# Patient Record
Sex: MALE | Race: WHITE | NOT HISPANIC OR LATINO | Employment: OTHER | URBAN - METROPOLITAN AREA
[De-identification: names, ages, dates, MRNs, and addresses within clinical notes are randomized per-mention and may not be internally consistent; named-entity substitution may affect disease eponyms.]

---

## 2018-07-20 ENCOUNTER — HOSPITAL LABORATORY (OUTPATIENT)
Dept: OTHER | Facility: CLINIC | Age: 58
End: 2018-07-20

## 2018-07-20 LAB
APPEARANCE FLD: NORMAL
COLOR FLD: NORMAL
CRYSTALS SNV MICRO: NORMAL
EOSINOPHIL NFR FLD MANUAL: 2 %
GRAM STN SPEC: NORMAL
LYMPHOCYTES NFR FLD MANUAL: 43 %
MONOS+MACROS NFR FLD MANUAL: 16 %
NEUTS BAND NFR FLD MANUAL: 36 %
OTHER CELLS FLD MANUAL: 3 %
SPECIMEN SOURCE FLD: NORMAL
SPECIMEN SOURCE: NORMAL
WBC # FLD AUTO: 362 /UL

## 2018-07-23 LAB
ACID FAST STN SPEC QL: NORMAL
SPECIMEN SOURCE: NORMAL

## 2018-07-25 LAB
BACTERIA SPEC CULT: NO GROWTH
SPECIMEN SOURCE: NORMAL

## 2018-07-31 LAB
SPECIMEN SOURCE: NORMAL
VIRUS SPEC CULT: NORMAL
VIRUS SPEC CULT: NORMAL

## 2018-08-03 LAB
BACTERIA SPEC CULT: NORMAL
Lab: NORMAL
SPECIMEN SOURCE: NORMAL

## 2018-08-17 LAB
FUNGUS SPEC CULT: NORMAL
SPECIMEN SOURCE: NORMAL

## 2018-09-18 LAB
MYCOBACTERIUM SPEC CULT: NORMAL
MYCOBACTERIUM SPEC CULT: NORMAL
SPECIMEN SOURCE: NORMAL

## 2020-09-04 ENCOUNTER — HOSPITAL ENCOUNTER (EMERGENCY)
Facility: CLINIC | Age: 60
Discharge: HOME | End: 2020-09-04
Attending: INTERNAL MEDICINE | Admitting: INTERNAL MEDICINE
Payer: COMMERCIAL

## 2020-09-04 ENCOUNTER — APPOINTMENT (OUTPATIENT)
Dept: GENERAL RADIOLOGY | Facility: CLINIC | Age: 60
End: 2020-09-04
Attending: INTERNAL MEDICINE
Payer: COMMERCIAL

## 2020-09-04 VITALS
HEART RATE: 104 BPM | OXYGEN SATURATION: 99 % | BODY MASS INDEX: 36.06 KG/M2 | TEMPERATURE: 97.8 F | WEIGHT: 290 LBS | SYSTOLIC BLOOD PRESSURE: 157 MMHG | HEIGHT: 75 IN | DIASTOLIC BLOOD PRESSURE: 89 MMHG | RESPIRATION RATE: 18 BRPM

## 2020-09-04 DIAGNOSIS — S63.279A DISLOCATION OF INTERPHALANGEAL JOINT OF FINGER, INITIAL ENCOUNTER: ICD-10-CM

## 2020-09-04 DIAGNOSIS — S61.214A LACERATION OF RIGHT RING FINGER WITHOUT FOREIGN BODY WITHOUT DAMAGE TO NAIL, INITIAL ENCOUNTER: ICD-10-CM

## 2020-09-04 PROCEDURE — 26770 TREAT FINGER DISLOCATION: CPT | Mod: F8

## 2020-09-04 PROCEDURE — 12001 RPR S/N/AX/GEN/TRNK 2.5CM/<: CPT | Mod: XU,F8

## 2020-09-04 PROCEDURE — 90471 IMMUNIZATION ADMIN: CPT

## 2020-09-04 PROCEDURE — 96374 THER/PROPH/DIAG INJ IV PUSH: CPT

## 2020-09-04 PROCEDURE — 25000128 H RX IP 250 OP 636: Performed by: INTERNAL MEDICINE

## 2020-09-04 PROCEDURE — 73140 X-RAY EXAM OF FINGER(S): CPT | Mod: RT

## 2020-09-04 PROCEDURE — 99284 EMERGENCY DEPT VISIT MOD MDM: CPT | Mod: 25

## 2020-09-04 PROCEDURE — 90715 TDAP VACCINE 7 YRS/> IM: CPT | Performed by: INTERNAL MEDICINE

## 2020-09-04 RX ORDER — LIDOCAINE HYDROCHLORIDE 10 MG/ML
INJECTION, SOLUTION INFILTRATION; PERINEURAL
Status: DISCONTINUED
Start: 2020-09-04 | End: 2020-09-04 | Stop reason: HOSPADM

## 2020-09-04 RX ORDER — CEFAZOLIN SODIUM 2 G/100ML
2 INJECTION, SOLUTION INTRAVENOUS ONCE
Status: COMPLETED | OUTPATIENT
Start: 2020-09-04 | End: 2020-09-04

## 2020-09-04 RX ORDER — CEPHALEXIN 500 MG/1
500 CAPSULE ORAL 4 TIMES DAILY
Qty: 40 CAPSULE | Refills: 0 | Status: SHIPPED | OUTPATIENT
Start: 2020-09-04 | End: 2020-09-14

## 2020-09-04 RX ORDER — HYDROCODONE BITARTRATE AND ACETAMINOPHEN 5; 325 MG/1; MG/1
1-2 TABLET ORAL EVERY 6 HOURS PRN
Qty: 12 TABLET | Refills: 0 | Status: SHIPPED | OUTPATIENT
Start: 2020-09-04 | End: 2022-06-09

## 2020-09-04 RX ADMIN — CEFAZOLIN SODIUM 2 G: 2 INJECTION, SOLUTION INTRAVENOUS at 20:03

## 2020-09-04 RX ADMIN — CLOSTRIDIUM TETANI TOXOID ANTIGEN (FORMALDEHYDE INACTIVATED), CORYNEBACTERIUM DIPHTHERIAE TOXOID ANTIGEN (FORMALDEHYDE INACTIVATED), BORDETELLA PERTUSSIS TOXOID ANTIGEN (GLUTARALDEHYDE INACTIVATED), BORDETELLA PERTUSSIS FILAMENTOUS HEMAGGLUTININ ANTIGEN (FORMALDEHYDE INACTIVATED), BORDETELLA PERTUSSIS PERTACTIN ANTIGEN, AND BORDETELLA PERTUSSIS FIMBRIAE 2/3 ANTIGEN 0.5 ML: 5; 2; 2.5; 5; 3; 5 INJECTION, SUSPENSION INTRAMUSCULAR at 20:04

## 2020-09-04 ASSESSMENT — ENCOUNTER SYMPTOMS
NERVOUS/ANXIOUS: 1
ARTHRALGIAS: 1
WOUND: 1

## 2020-09-04 ASSESSMENT — MIFFLIN-ST. JEOR: SCORE: 2206.06

## 2020-09-04 NOTE — ED AVS SNAPSHOT
[facility] Emergency Department  [address]  [facility] [address]  Phone:  [phone]  Fax:  [phone]                                    Kalen Callejas   MRN: [id number]    Department:  [facility] Emergency Department   Date of Visit:  9/4/2020           After Visit Summary Signature Page    I have received my discharge instructions, and my questions have been answered. I have discussed any challenges I see with this plan with the nurse or doctor.    ..........................................................................................................................................  Patient/Patient Representative Signature      ..........................................................................................................................................  Patient Representative Print Name and Relationship to Patient    ..................................................               ................................................  Date                                   Time    ..........................................................................................................................................  Reviewed by Signature/Title    ...................................................              ..............................................  Date                                               Time          22EPIC Rev 08/18

## 2020-09-04 NOTE — ED TRIAGE NOTES
Slipped on wood floor at home and injured right 4th finger.  CMS intact Laceration to finger.  Bleeding controlled.  Tetanus unknown.

## 2020-09-05 NOTE — ED PROVIDER NOTES
"  History     Chief Complaint:  Hand Injury    HPI   Kalen Callejas is a left handed 60 year old male who presents with right hand injury. The patient was at home when he slipped on wood floors. He has a laceration and deformity to the fourth digit on his right hand. He arrived with a dish towel wrapped around hand, bleeding is controlled. The patient states anxiety about being in hospital due to COVID-19. The patient's last tetanus was 2012.     Allergies:  Bee Venom    Medications:    Pulmicort inhaler   Allegra   Astepro   Albuterol inhaler   Deltasone   Flonase   Lisinopril     Past Medical History:    Hypertension   Obesity   Asthma   Allergic rhinitis     Past Surgical History:    Carpal tunnel release - right     Family History:    Stroke - father   Lung cancer - mother   Kidney cancer - brother   Colon cancer - brother     Social History:  Smoking Status: Never Smoker  Smokeless Tobacco: Never Used  Alcohol Use: Positive  Marital Status:        Review of Systems   Musculoskeletal: Positive for arthralgias.   Skin: Positive for wound.   Psychiatric/Behavioral: The patient is nervous/anxious.        Physical Exam     Patient Vitals for the past 24 hrs:   BP Temp Temp src Pulse Resp SpO2 Height Weight   09/04/20 2040 (!) 157/89 -- -- 104 18 99 % -- --   09/04/20 1849 (!) 180/95 97.8  F (36.6  C) Oral 126 22 99 % 1.905 m (6' 3\") 131.5 kg (290 lb)       Physical Exam  Cardiovascular:      Pulses: Normal pulses.   Musculoskeletal:        Hands:       Comments: Obvious deformity at the right fourth finger DIP joint.  There is a laceration on the volar side with some protruding material, possibly the end of the phalanx.   Skin:     Findings: No abrasion, ecchymosis or laceration.   Neurological:      Mental Status: He is alert.      Sensory: No sensory deficit.   Psychiatric:         Behavior: Behavior is cooperative.         Emergency Department Course     Imaging:  Radiology findings were communicated " with the patient who voiced understanding of the findings.    XR Finger Right G/E 2 Views  Ring finger distal interphalangeal joint dislocation. The head of the middle phalanx may be exposed through the skin.     Reading per radiology.    Procedures    Laceration Repair        LACERATION:  A simple clean 1.5 cm laceration.      LOCATION:  Volar right 4th finger, at DIP crease      ANESTHESIA:  Digital block using lidocaine 1% total of 1 mLs        CLOSURE:  Wound was closed with #2 simple interrupted 4-0 Ethilon      Reduction     SITE: 4th digit of right hand.    PROCEDURE PROVIDER: Dr. Perlita Zayas    REDUCTION COMMENTS: The patient's finger was held in traction.  The mechanism of injury was reproduced but I was unable to reduce the dislocation.  I then flexed sharply at the MCP and PIP joints and with direct pressure there was a pop and they distal finger had returned to normal morphology.       Interventions:  2003 Cefazolin, 2 g, IV   2004 Tdap, 0.5 mL, IM    Emergency Department Course:     Nursing notes and vitals reviewed.    1915 I performed an exam of the patient as documented above.     1942 The patient was sent for a XR while in the emergency department, results above.     1955 Patient rechecked and updated with XR results and images.    2024 I spoke with Dr. Koch from [facility] regarding patient's presentation, findings, and plan of care.    I performed the fracture reduction and laceration repair procedures as documented above.    2045 Findings and plan explained to the Patient. Patient discharged home with instructions regarding supportive care, medications, and reasons to return. The importance of close follow-up was reviewed. The patient was prescribed Keflex and Norco.    Impression & Plan      Medical Decision Making:  Kalen Callejas is a 60 year old male who presents to the emergency department today for evaluation of injury to the right fourth finger.  He appears to have an open  dislocation at the DIP joint.  This was reduced and laceration repaired as noted.  He was placed in a splint.  I have discussed the case with hand surgery.  Patient was given a tetanus shot and IV Ancef.  I have discharged him on oral Keflex and Norco.  He will see hand surgery early next week, return if problems.    Diagnosis:    ICD-10-CM    1. Dislocation of interphalangeal joint of finger, initial encounter  S63.279A    2. Laceration of right ring finger without foreign body without damage to nail, initial encounter  S61.214A      Disposition:   Patient is discharged home.     Discharge Medications:    START taking      Dose / Directions   cephALEXin 500 MG capsule  Commonly known as:  KEFLEX      Dose:  500 mg  Take 1 capsule (500 mg) by mouth 4 times daily for 10 days  Quantity:  40 capsule  Refills:  0     HYDROcodone-acetaminophen 5-325 MG tablet  Commonly known as:  NORCO      Dose:  1-2 tablet  Take 1-2 tablets by mouth every 6 hours as needed for pain  Quantity:  12 tablet  Refills:  0       Scribe Disclosure:  I, Leonela Blevins, am serving as a scribe at 7:22 PM on 9/4/2020 to document services personally performed by Perlita Zayas MD based on my observations and the provider's statements to me.  [facility] EMERGENCY DEPARTMENT       Perlita Zayas MD  09/04/20 2100

## 2020-09-05 NOTE — DISCHARGE INSTRUCTIONS

## 2022-06-09 ENCOUNTER — HOSPITAL ENCOUNTER (INPATIENT)
Facility: CLINIC | Age: 62
LOS: 8 days | Discharge: HOME | End: 2022-06-17
Attending: EMERGENCY MEDICINE | Admitting: SURGERY
Payer: COMMERCIAL

## 2022-06-09 ENCOUNTER — APPOINTMENT (OUTPATIENT)
Dept: CT IMAGING | Facility: CLINIC | Age: 62
End: 2022-06-09
Attending: INTERNAL MEDICINE
Payer: COMMERCIAL

## 2022-06-09 ENCOUNTER — HOSPITAL ENCOUNTER (EMERGENCY)
Facility: CLINIC | Age: 62
Discharge: ANOTHER HOSPITAL | End: 2022-06-09
Attending: INTERNAL MEDICINE | Admitting: INTERNAL MEDICINE
Payer: COMMERCIAL

## 2022-06-09 VITALS
DIASTOLIC BLOOD PRESSURE: 120 MMHG | TEMPERATURE: 98.7 F | WEIGHT: 295 LBS | BODY MASS INDEX: 36.68 KG/M2 | HEIGHT: 75 IN | HEART RATE: 116 BPM | RESPIRATION RATE: 16 BRPM | OXYGEN SATURATION: 99 % | SYSTOLIC BLOOD PRESSURE: 182 MMHG

## 2022-06-09 DIAGNOSIS — S22.081A BURST FRACTURE OF T12 VERTEBRA (H): ICD-10-CM

## 2022-06-09 DIAGNOSIS — S22.081A T12 BURST FRACTURE (H): ICD-10-CM

## 2022-06-09 DIAGNOSIS — R00.0 TACHYCARDIA, UNSPECIFIED: ICD-10-CM
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
## 2022-06-09 LAB
ANION GAP SERPL CALCULATED.3IONS-SCNC: 10 MMOL/L (ref 3–14)
BASOPHILS # BLD AUTO: 0.1 10E3/UL (ref 0–0.2)
BASOPHILS NFR BLD AUTO: 1 %
BUN SERPL-MCNC: 13 MG/DL (ref 7–30)
CALCIUM SERPL-MCNC: 9 MG/DL (ref 8.5–10.1)
CHLORIDE BLD-SCNC: 104 MMOL/L (ref 94–109)
CO2 SERPL-SCNC: 25 MMOL/L (ref 20–32)
CREAT SERPL-MCNC: 0.66 MG/DL (ref 0.66–1.25)
EOSINOPHIL # BLD AUTO: 0.2 10E3/UL (ref 0–0.7)
EOSINOPHIL NFR BLD AUTO: 2 %
ERYTHROCYTE [DISTWIDTH] IN BLOOD BY AUTOMATED COUNT: 12 % (ref 10–15)
GFR SERPL CREATININE-BSD FRML MDRD: >90 ML/MIN/1.73M2
GLUCOSE BLD-MCNC: 156 MG/DL (ref 70–99)
HCT VFR BLD AUTO: 46.5 % (ref 40–53)
HGB BLD-MCNC: 16.1 G/DL (ref 13.3–17.7)
IMM GRANULOCYTES # BLD: 0 10E3/UL
IMM GRANULOCYTES NFR BLD: 0 %
INR PPP: 1.07 (ref 0.85–1.15)
LYMPHOCYTES # BLD AUTO: 1.1 10E3/UL (ref 0.8–5.3)
LYMPHOCYTES NFR BLD AUTO: 14 %
MAGNESIUM SERPL-MCNC: 2.1 MG/DL (ref 1.6–2.3)
MCH RBC QN AUTO: 35.2 PG (ref 26.5–33)
MCHC RBC AUTO-ENTMCNC: 34.6 G/DL (ref 31.5–36.5)
MCV RBC AUTO: 102 FL (ref 78–100)
MONOCYTES # BLD AUTO: 0.8 10E3/UL (ref 0–1.3)
MONOCYTES NFR BLD AUTO: 11 %
NEUTROPHILS # BLD AUTO: 5.6 10E3/UL (ref 1.6–8.3)
NEUTROPHILS NFR BLD AUTO: 72 %
NRBC # BLD AUTO: 0 10E3/UL
NRBC BLD AUTO-RTO: 0 /100
PLATELET # BLD AUTO: 198 10E3/UL (ref 150–450)
POTASSIUM BLD-SCNC: 3.5 MMOL/L (ref 3.4–5.3)
RADIOLOGIST FLAGS: ABNORMAL
RBC # BLD AUTO: 4.58 10E6/UL (ref 4.4–5.9)
SARS-COV-2 RNA RESP QL NAA+PROBE: NEGATIVE
SODIUM SERPL-SCNC: 139 MMOL/L (ref 133–144)
WBC # BLD AUTO: 7.8 10E3/UL (ref 4–11)

## 2022-06-09 PROCEDURE — 250N000011 HC RX IP 250 OP 636: Performed by: INTERNAL MEDICINE

## 2022-06-09 PROCEDURE — 120N000003 HC R&B IMCU UMMC

## 2022-06-09 PROCEDURE — 99284 EMERGENCY DEPT VISIT MOD MDM: CPT | Mod: 25 | Performed by: EMERGENCY MEDICINE

## 2022-06-09 PROCEDURE — 93005 ELECTROCARDIOGRAM TRACING: CPT | Performed by: EMERGENCY MEDICINE

## 2022-06-09 PROCEDURE — 93010 ELECTROCARDIOGRAM REPORT: CPT | Performed by: EMERGENCY MEDICINE

## 2022-06-09 PROCEDURE — U0003 INFECTIOUS AGENT DETECTION BY NUCLEIC ACID (DNA OR RNA); SEVERE ACUTE RESPIRATORY SYNDROME CORONAVIRUS 2 (SARS-COV-2) (CORONAVIRUS DISEASE [COVID-19]), AMPLIFIED PROBE TECHNIQUE, MAKING USE OF HIGH THROUGHPUT TECHNOLOGIES AS DESCRIBED BY CMS-2020-01-R: HCPCS | Performed by: INTERNAL MEDICINE

## 2022-06-09 PROCEDURE — 85025 COMPLETE CBC W/AUTO DIFF WBC: CPT | Performed by: EMERGENCY MEDICINE

## 2022-06-09 PROCEDURE — 83735 ASSAY OF MAGNESIUM: CPT | Performed by: STUDENT IN AN ORGANIZED HEALTH CARE EDUCATION/TRAINING PROGRAM

## 2022-06-09 PROCEDURE — 99285 EMERGENCY DEPT VISIT HI MDM: CPT | Mod: 25

## 2022-06-09 PROCEDURE — 74176 CT ABD & PELVIS W/O CONTRAST: CPT

## 2022-06-09 PROCEDURE — 82435 ASSAY OF BLOOD CHLORIDE: CPT | Performed by: EMERGENCY MEDICINE

## 2022-06-09 PROCEDURE — 250N000013 HC RX MED GY IP 250 OP 250 PS 637: Performed by: STUDENT IN AN ORGANIZED HEALTH CARE EDUCATION/TRAINING PROGRAM

## 2022-06-09 PROCEDURE — 96375 TX/PRO/DX INJ NEW DRUG ADDON: CPT

## 2022-06-09 PROCEDURE — 250N000011 HC RX IP 250 OP 636: Performed by: EMERGENCY MEDICINE

## 2022-06-09 PROCEDURE — C9803 HOPD COVID-19 SPEC COLLECT: HCPCS

## 2022-06-09 PROCEDURE — 96374 THER/PROPH/DIAG INJ IV PUSH: CPT | Performed by: EMERGENCY MEDICINE

## 2022-06-09 PROCEDURE — 85610 PROTHROMBIN TIME: CPT | Performed by: EMERGENCY MEDICINE

## 2022-06-09 PROCEDURE — 250N000013 HC RX MED GY IP 250 OP 250 PS 637: Performed by: EMERGENCY MEDICINE

## 2022-06-09 PROCEDURE — 36415 COLL VENOUS BLD VENIPUNCTURE: CPT | Performed by: EMERGENCY MEDICINE

## 2022-06-09 PROCEDURE — 96374 THER/PROPH/DIAG INJ IV PUSH: CPT

## 2022-06-09 PROCEDURE — 99285 EMERGENCY DEPT VISIT HI MDM: CPT | Mod: 25 | Performed by: EMERGENCY MEDICINE

## 2022-06-09 RX ORDER — ONDANSETRON 2 MG/ML
4 INJECTION INTRAMUSCULAR; INTRAVENOUS EVERY 6 HOURS PRN
Status: DISCONTINUED | OUTPATIENT
Start: 2022-06-09 | End: 2022-06-14

## 2022-06-09 RX ORDER — SODIUM CHLORIDE 9 MG/ML
1000 INJECTION, SOLUTION INTRAVENOUS CONTINUOUS
Status: DISCONTINUED | OUTPATIENT
Start: 2022-06-09 | End: 2022-06-11

## 2022-06-09 RX ORDER — OXYCODONE HYDROCHLORIDE 5 MG/1
5-10 TABLET ORAL
Status: DISCONTINUED | OUTPATIENT
Start: 2022-06-09 | End: 2022-06-11

## 2022-06-09 RX ORDER — HYDROMORPHONE HYDROCHLORIDE 1 MG/ML
0.5 INJECTION, SOLUTION INTRAMUSCULAR; INTRAVENOUS; SUBCUTANEOUS
Status: COMPLETED | OUTPATIENT
Start: 2022-06-09 | End: 2022-06-09

## 2022-06-09 RX ORDER — ACETAMINOPHEN 325 MG/1
975 TABLET ORAL 3 TIMES DAILY
Status: DISCONTINUED | OUTPATIENT
Start: 2022-06-09 | End: 2022-06-13

## 2022-06-09 RX ORDER — LISINOPRIL 5 MG/1
20 TABLET ORAL ONCE
Status: COMPLETED | OUTPATIENT
Start: 2022-06-09 | End: 2022-06-09

## 2022-06-09 RX ORDER — ONDANSETRON 4 MG/1
4 TABLET, ORALLY DISINTEGRATING ORAL EVERY 6 HOURS PRN
Status: DISCONTINUED | OUTPATIENT
Start: 2022-06-09 | End: 2022-06-14

## 2022-06-09 RX ORDER — AMOXICILLIN 250 MG
2 CAPSULE ORAL 2 TIMES DAILY
Status: DISCONTINUED | OUTPATIENT
Start: 2022-06-09 | End: 2022-06-14

## 2022-06-09 RX ORDER — POLYETHYLENE GLYCOL 3350 17 G/17G
17 POWDER, FOR SOLUTION ORAL DAILY
Status: DISCONTINUED | OUTPATIENT
Start: 2022-06-10 | End: 2022-06-11

## 2022-06-09 RX ORDER — METHOCARBAMOL 500 MG/1
500 TABLET, FILM COATED ORAL EVERY 6 HOURS PRN
Status: DISCONTINUED | OUTPATIENT
Start: 2022-06-09 | End: 2022-06-10

## 2022-06-09 RX ORDER — HYDROMORPHONE HYDROCHLORIDE 1 MG/ML
0.3 INJECTION, SOLUTION INTRAMUSCULAR; INTRAVENOUS; SUBCUTANEOUS
Status: DISCONTINUED | OUTPATIENT
Start: 2022-06-09 | End: 2022-06-11

## 2022-06-09 RX ORDER — LIDOCAINE 4 G/G
1 PATCH TOPICAL
Status: DISCONTINUED | OUTPATIENT
Start: 2022-06-09 | End: 2022-06-10

## 2022-06-09 RX ORDER — HYDROMORPHONE HYDROCHLORIDE 1 MG/ML
0.5 INJECTION, SOLUTION INTRAMUSCULAR; INTRAVENOUS; SUBCUTANEOUS ONCE
Status: COMPLETED | OUTPATIENT
Start: 2022-06-09 | End: 2022-06-09

## 2022-06-09 RX ORDER — LISINOPRIL 20 MG/1
20 TABLET ORAL DAILY
Status: ON HOLD | COMMUNITY
End: 2023-08-25

## 2022-06-09 RX ADMIN — ACETAMINOPHEN 975 MG: 325 TABLET, FILM COATED ORAL at 23:52

## 2022-06-09 RX ADMIN — HYDROMORPHONE HYDROCHLORIDE 0.5 MG: 1 INJECTION, SOLUTION INTRAMUSCULAR; INTRAVENOUS; SUBCUTANEOUS at 20:25

## 2022-06-09 RX ADMIN — OXYCODONE HYDROCHLORIDE 5 MG: 5 TABLET ORAL at 23:52

## 2022-06-09 RX ADMIN — HYDROMORPHONE HYDROCHLORIDE 0.5 MG: 1 INJECTION, SOLUTION INTRAMUSCULAR; INTRAVENOUS; SUBCUTANEOUS at 16:24

## 2022-06-09 RX ADMIN — HYDROMORPHONE HYDROCHLORIDE 0.5 MG: 1 INJECTION, SOLUTION INTRAMUSCULAR; INTRAVENOUS; SUBCUTANEOUS at 18:16

## 2022-06-09 RX ADMIN — METHOCARBAMOL 500 MG: 500 TABLET ORAL at 23:52

## 2022-06-09 RX ADMIN — LIDOCAINE 1 PATCH: 246 PATCH TOPICAL at 23:52

## 2022-06-09 RX ADMIN — HYDROMORPHONE HYDROCHLORIDE 0.5 MG: 1 INJECTION, SOLUTION INTRAMUSCULAR; INTRAVENOUS; SUBCUTANEOUS at 21:22

## 2022-06-09 RX ADMIN — LISINOPRIL 20 MG: 5 TABLET ORAL at 23:52

## 2022-06-09 ASSESSMENT — ENCOUNTER SYMPTOMS
EYE REDNESS: 0
NAUSEA: 0
DIFFICULTY URINATING: 0
ABDOMINAL PAIN: 0
BACK PAIN: 1
COUGH: 0
NECK PAIN: 0
FEVER: 0
SLEEP DISTURBANCE: 0
SORE THROAT: 0
SHORTNESS OF BREATH: 0
DYSPHORIC MOOD: 0
HEADACHES: 0
WEAKNESS: 0
VOMITING: 0

## 2022-06-09 ASSESSMENT — ACTIVITIES OF DAILY LIVING (ADL): ADLS_ACUITY_SCORE: 35

## 2022-06-09 NOTE — PROGRESS NOTES
Paged by Dr. Zayas at [facility] ED.    62M presented to ED with back pain s/p fall after he choked on a piece of food on Sunday 6/5. Per ED,  patient is neuro intact, no radicular leg pain, numbness, or weakness. CT findings as below:    CT ABDOMEN AND PELVIS WITHOUT CONTRAST  6/9/2022 3:51 PM  IMPRESSION:   1.  Mild age-indeterminate compression of the T12 vertebral body is likely acute, and there is associated retropulsion of bony fragments and significant narrowing of the spinal canal.  2.  Diffuse fatty infiltration of the liver.  3.  Nonobstructing 0.3 cm right renal stone.  4.  Mild splenomegaly.  5.  Several indeterminate pulmonary nodules at both lung bases measure 0.4 cm or smaller. Please refer to pulmonary nodule follow-up guidelines below.    Reviewed with Dr. Akins.   - Recommend transfer to the  of    - Lumbar spine MRI   - Bedrest   - Continue neuro checks and pain control measures  - Please call with questions or concerns     Kelli Craig CNP  [facility] Neurosurgery  [address] [place] [id number]  Tel [phone]

## 2022-06-09 NOTE — ED NOTES
Bed: HW01  Expected date: 6/9/22  Expected time: 2:49 PM  Means of arrival:   Comments:  BV-1  62M  Fall/Back Pain

## 2022-06-09 NOTE — ED PROVIDER NOTES
"  History     Chief Complaint:  Back Pain       HPI   Kalen Callejas is a 62 year old male who arrives in the emergency department via ambulance complaining of back pain.  He states that on Sunday, June 5 he was getting ready for golf game.  He took a bite of watermelon and choked on the seat, causing him to cough and slipped falling backward hitting his low back against the edge of the cabinet of the stove.  He noted immediate pain which has been ongoing since then.  He had some leftover pain pills in his cabinet which she has been taking as well as other pain medicine.  He says he has not been eating or drinking much because he is afraid he will need to move his bowels if he does.  He says the pain did not get worse but he promised himself that if it was not better by today he come in.  He has not noted any blood in the urine.  No feeling of numbness or paralysis of the legs.  He has not had any incontinence of bowel or bladder.    ROS:  Review of Systems   All other systems reviewed and are negative.      Allergies:  No Known Allergies     Medications:    HYDROcodone-acetaminophen (NORCO) 5-325 MG tablet        Past Medical History:    No past medical history on file.    Past Surgical History:    No past surgical history on file.     Family History:    family history is not on file.    Social History:     PCP: Júnior Bermudez     Physical Exam     Patient Vitals for the past 24 hrs:   BP Temp Temp src Pulse Resp SpO2 Height Weight   06/09/22 1800 (!) 182/120 -- -- 116 16 99 % -- --   06/09/22 1630 -- -- -- 120 18 99 % -- --   06/09/22 1508 (!) 168/114 98.7  F (37.1  C) Temporal (!) 132 20 99 % 1.892 m (6' 2.5\") 133.8 kg (295 lb)        Physical Exam  Constitutional:       Comments: Pleasant and cooperative   HENT:      Mouth/Throat:      Pharynx: No posterior oropharyngeal erythema.   Eyes:      Conjunctiva/sclera: Conjunctivae normal.   Cardiovascular:      Rate and Rhythm: Normal rate and regular rhythm.     "  Heart sounds: Normal heart sounds.   Pulmonary:      Effort: Pulmonary effort is normal.      Breath sounds: Normal breath sounds.   Abdominal:      General: Bowel sounds are normal. There is no distension.      Palpations: Abdomen is soft.      Tenderness: There is no abdominal tenderness. There is no guarding or rebound.   Musculoskeletal:         General: Normal range of motion.      Cervical back: Neck supple.      Comments: No distinct area of tenderness in the back, patient says it is not something I can press on.   Skin:     General: Skin is warm and dry.   Neurological:      Mental Status: He is alert.         Emergency Department Course   ECG:  No results found for this or any previous visit.    Imaging:  CT Abdomen Pelvis w/o Contrast   Final Result   Abnormal   IMPRESSION:    1.  Mild age-indeterminate compression of the T12 vertebral body is   likely acute, and there is associated retropulsion of bony fragments   and significant narrowing of the spinal canal, likely causing some   degree of spinal cord compression.   2.  Diffuse fatty infiltration of the liver.   3.  Nonobstructing 0.3 cm right renal stone.   4.  Mild splenomegaly.   5.  Several indeterminate pulmonary nodules at both lung bases measure   0.4 cm or smaller. Please refer to pulmonary nodule follow-up   guidelines below.      Recommendations for one or multiple incidental lung nodules < 6mm:     Low risk patients: No routine follow-up.     High risk patients: Optional follow-up CT at 12 months; if   unchanged, no further follow-up.      *Low Risk: Minimal or absent history of smoking or other known risk   factors.   *Nonsolid (ground glass) or partly solid nodules may require longer   follow-up to exclude indolent adenocarcinoma.   *Recommendations based on Guidelines for the Management of Incidental   Pulmonary Nodules Detected at CT: From the Fleischner Society 2017,   Radiology 2017.   *Guidelines apply to incidental nodules in  patients who are 35 years   or older.   *Guidelines do not apply to lung cancer screening, patients with   immunosuppression, or patients with known primary cancer.      [Critical Result: T12 compression fracture with associated narrowing   of the spinal canal]      Finding was identified on 6/9/2022 3:59 PM.       Dr. Moura was contacted by me on 6/9/2022 4:11 PM and verbalized   understanding of the critical result.       RICHY MUNOZ MD            SYSTEM ID:  [id number]         Report per radiology    Laboratory:  Labs Ordered and Resulted from Time of ED Arrival to Time of ED Departure   COVID-19 VIRUS (CORONAVIRUS) BY PCR - Normal       Result Value    SARS CoV2 PCR Negative          Procedures       Emergency Department Course:             Reviewed:  I reviewed nursing notes, vitals and past medical history    Assessments:   I obtained history and examined the patient as noted above.    I rechecked the patient and explained findings.       Consults:   Kelli Craig NP for neurosurgery  Dr. BILL Whitney, ED at [facility]     Interventions:  Medications   HYDROmorphone (PF) (DILAUDID) injection 0.5 mg (0.5 mg Intravenous Given 6/9/22 1816)        Disposition:  The patient was transferred to [facility] via ambulance. Dr. Whitney accepted the patient for transfer.     Impression & Plan        Medical Decision Making:    Kalen Callejas is a 62 year old male who presents to the emergency department by ambulance with a injury to the back which actually occurred 4 days ago.  CT demonstrated a fracture of T12 with retropulsed fragments threatening the spinal cord.  Fortunately he is not having any neurologic signs or symptoms.  I discussed the case with neurosurgery who recommended transfer to the [facility].  I will have the patient on strict bedrest.  He will be transferred via [facility] ambulance.    Diagnosis:    ICD-10-CM    1. Burst fracture of T12 vertebra (H)  S22.081A         Discharge Medications:  New  Prescriptions    No medications on file        6/9/2022   Perlita Zayas MD [place], Susan Gail, MD  06/09/22 1938

## 2022-06-09 NOTE — ED TRIAGE NOTES
Patient arrives by EMS for back pain from fall. Patient had fall on Sunday - hit back on kitchen counter.   EMG gave Fentyl 75 mcg IVP  ABC intact alert and no distress.     Triage Assessment     Row Name 06/09/22 1502       Triage Assessment (Adult)    Airway WDL WDL       Respiratory WDL    Respiratory WDL WDL       Skin Circulation/Temperature WDL    Skin Circulation/Temperature WDL WDL       Cardiac WDL    Cardiac WDL WDL       Peripheral/Neurovascular WDL    Peripheral Neurovascular WDL WDL       Cognitive/Neuro/Behavioral WDL    Cognitive/Neuro/Behavioral WDL WDL

## 2022-06-10 ENCOUNTER — APPOINTMENT (OUTPATIENT)
Dept: MRI IMAGING | Facility: CLINIC | Age: 62
End: 2022-06-10
Attending: STUDENT IN AN ORGANIZED HEALTH CARE EDUCATION/TRAINING PROGRAM
Payer: COMMERCIAL

## 2022-06-10 LAB
ANION GAP SERPL CALCULATED.3IONS-SCNC: 12 MMOL/L (ref 3–14)
ATRIAL RATE - MUSE: 124 BPM
BUN SERPL-MCNC: 14 MG/DL (ref 7–30)
CALCIUM SERPL-MCNC: 8.9 MG/DL (ref 8.5–10.1)
CHLORIDE BLD-SCNC: 106 MMOL/L (ref 94–109)
CO2 SERPL-SCNC: 24 MMOL/L (ref 20–32)
CREAT SERPL-MCNC: 0.57 MG/DL (ref 0.66–1.25)
DIASTOLIC BLOOD PRESSURE - MUSE: NORMAL MMHG
ERYTHROCYTE [DISTWIDTH] IN BLOOD BY AUTOMATED COUNT: 12.1 % (ref 10–15)
GFR SERPL CREATININE-BSD FRML MDRD: >90 ML/MIN/1.73M2
GLUCOSE BLD-MCNC: 145 MG/DL (ref 70–99)
GLUCOSE BLDC GLUCOMTR-MCNC: 131 MG/DL (ref 70–99)
HCT VFR BLD AUTO: 43.1 % (ref 40–53)
HGB BLD-MCNC: 14.8 G/DL (ref 13.3–17.7)
INTERPRETATION ECG - MUSE: NORMAL
MAGNESIUM SERPL-MCNC: 2.1 MG/DL (ref 1.6–2.3)
MCH RBC QN AUTO: 34.8 PG (ref 26.5–33)
MCHC RBC AUTO-ENTMCNC: 34.3 G/DL (ref 31.5–36.5)
MCV RBC AUTO: 101 FL (ref 78–100)
P AXIS - MUSE: 55 DEGREES
PHOSPHATE SERPL-MCNC: 2.9 MG/DL (ref 2.5–4.5)
PLATELET # BLD AUTO: 212 10E3/UL (ref 150–450)
POTASSIUM BLD-SCNC: 3.4 MMOL/L (ref 3.4–5.3)
POTASSIUM BLD-SCNC: 4.1 MMOL/L (ref 3.4–5.3)
PR INTERVAL - MUSE: 154 MS
QRS DURATION - MUSE: 74 MS
QT - MUSE: 324 MS
QTC - MUSE: 465 MS
R AXIS - MUSE: 10 DEGREES
RBC # BLD AUTO: 4.25 10E6/UL (ref 4.4–5.9)
SODIUM SERPL-SCNC: 142 MMOL/L (ref 133–144)
SYSTOLIC BLOOD PRESSURE - MUSE: NORMAL MMHG
T AXIS - MUSE: 25 DEGREES
VENTRICULAR RATE- MUSE: 124 BPM
WBC # BLD AUTO: 7.3 10E3/UL (ref 4–11)

## 2022-06-10 PROCEDURE — 80048 BASIC METABOLIC PNL TOTAL CA: CPT | Performed by: STUDENT IN AN ORGANIZED HEALTH CARE EDUCATION/TRAINING PROGRAM

## 2022-06-10 PROCEDURE — 72148 MRI LUMBAR SPINE W/O DYE: CPT | Mod: 26 | Performed by: RADIOLOGY

## 2022-06-10 PROCEDURE — 84100 ASSAY OF PHOSPHORUS: CPT | Performed by: STUDENT IN AN ORGANIZED HEALTH CARE EDUCATION/TRAINING PROGRAM

## 2022-06-10 PROCEDURE — 72146 MRI CHEST SPINE W/O DYE: CPT | Mod: 26 | Performed by: RADIOLOGY

## 2022-06-10 PROCEDURE — 83735 ASSAY OF MAGNESIUM: CPT | Performed by: STUDENT IN AN ORGANIZED HEALTH CARE EDUCATION/TRAINING PROGRAM

## 2022-06-10 PROCEDURE — 250N000013 HC RX MED GY IP 250 OP 250 PS 637: Performed by: SURGERY

## 2022-06-10 PROCEDURE — 72148 MRI LUMBAR SPINE W/O DYE: CPT

## 2022-06-10 PROCEDURE — 36415 COLL VENOUS BLD VENIPUNCTURE: CPT | Performed by: SURGERY

## 2022-06-10 PROCEDURE — 250N000013 HC RX MED GY IP 250 OP 250 PS 637: Performed by: PHYSICIAN ASSISTANT

## 2022-06-10 PROCEDURE — 99232 SBSQ HOSP IP/OBS MODERATE 35: CPT | Performed by: PHYSICIAN ASSISTANT

## 2022-06-10 PROCEDURE — L0486 TLSO RIGIDLINED CUST FAB TWO: HCPCS

## 2022-06-10 PROCEDURE — 250N000013 HC RX MED GY IP 250 OP 250 PS 637: Performed by: STUDENT IN AN ORGANIZED HEALTH CARE EDUCATION/TRAINING PROGRAM

## 2022-06-10 PROCEDURE — 999N000128 HC STATISTIC PERIPHERAL IV START W/O US GUIDANCE

## 2022-06-10 PROCEDURE — 258N000003 HC RX IP 258 OP 636: Performed by: STUDENT IN AN ORGANIZED HEALTH CARE EDUCATION/TRAINING PROGRAM

## 2022-06-10 PROCEDURE — 36415 COLL VENOUS BLD VENIPUNCTURE: CPT | Performed by: STUDENT IN AN ORGANIZED HEALTH CARE EDUCATION/TRAINING PROGRAM

## 2022-06-10 PROCEDURE — 72146 MRI CHEST SPINE W/O DYE: CPT

## 2022-06-10 PROCEDURE — 250N000011 HC RX IP 250 OP 636: Performed by: STUDENT IN AN ORGANIZED HEALTH CARE EDUCATION/TRAINING PROGRAM

## 2022-06-10 PROCEDURE — 84132 ASSAY OF SERUM POTASSIUM: CPT | Performed by: SURGERY

## 2022-06-10 PROCEDURE — 85027 COMPLETE CBC AUTOMATED: CPT | Performed by: STUDENT IN AN ORGANIZED HEALTH CARE EDUCATION/TRAINING PROGRAM

## 2022-06-10 PROCEDURE — 120N000003 HC R&B IMCU UMMC

## 2022-06-10 RX ORDER — AZELASTINE HCL 205.5 UG/1
1 SPRAY NASAL 2 TIMES DAILY
Status: ON HOLD | COMMUNITY
Start: 2022-05-17 | End: 2023-08-25

## 2022-06-10 RX ORDER — ALBUTEROL SULFATE 90 UG/1
2 AEROSOL, METERED RESPIRATORY (INHALATION) EVERY 6 HOURS PRN
Status: DISCONTINUED | OUTPATIENT
Start: 2022-06-10 | End: 2022-06-17 | Stop reason: HOSPADM

## 2022-06-10 RX ORDER — NALOXONE HYDROCHLORIDE 0.4 MG/ML
0.2 INJECTION, SOLUTION INTRAMUSCULAR; INTRAVENOUS; SUBCUTANEOUS
Status: DISCONTINUED | OUTPATIENT
Start: 2022-06-10 | End: 2022-06-14

## 2022-06-10 RX ORDER — LIDOCAINE 4 G/G
1-3 PATCH TOPICAL
Status: DISCONTINUED | OUTPATIENT
Start: 2022-06-10 | End: 2022-06-14

## 2022-06-10 RX ORDER — CALCIUM CARBONATE 500 MG/1
500 TABLET, CHEWABLE ORAL 3 TIMES DAILY PRN
Status: DISCONTINUED | OUTPATIENT
Start: 2022-06-10 | End: 2022-06-17 | Stop reason: HOSPADM

## 2022-06-10 RX ORDER — NALOXONE HYDROCHLORIDE 0.4 MG/ML
0.4 INJECTION, SOLUTION INTRAMUSCULAR; INTRAVENOUS; SUBCUTANEOUS
Status: DISCONTINUED | OUTPATIENT
Start: 2022-06-10 | End: 2022-06-14

## 2022-06-10 RX ORDER — POTASSIUM CHLORIDE 750 MG/1
40 TABLET, EXTENDED RELEASE ORAL ONCE
Status: COMPLETED | OUTPATIENT
Start: 2022-06-10 | End: 2022-06-10

## 2022-06-10 RX ORDER — LISINOPRIL 20 MG/1
20 TABLET ORAL DAILY
Status: DISCONTINUED | OUTPATIENT
Start: 2022-06-10 | End: 2022-06-17 | Stop reason: HOSPADM

## 2022-06-10 RX ORDER — METHOCARBAMOL 750 MG/1
750 TABLET, FILM COATED ORAL 4 TIMES DAILY
Status: DISCONTINUED | OUTPATIENT
Start: 2022-06-10 | End: 2022-06-14

## 2022-06-10 RX ORDER — AZELASTINE HCL 205.5 UG/1
1 SPRAY NASAL 2 TIMES DAILY
Status: DISCONTINUED | OUTPATIENT
Start: 2022-06-10 | End: 2022-06-10 | Stop reason: RX

## 2022-06-10 RX ORDER — ALBUTEROL SULFATE 90 UG/1
2 AEROSOL, METERED RESPIRATORY (INHALATION) EVERY 4 HOURS PRN
COMMUNITY
Start: 2022-05-17

## 2022-06-10 RX ORDER — FLUTICASONE PROPIONATE 50 MCG
2 SPRAY, SUSPENSION (ML) NASAL DAILY
COMMUNITY

## 2022-06-10 RX ORDER — GABAPENTIN 100 MG/1
100 CAPSULE ORAL 3 TIMES DAILY
Status: DISCONTINUED | OUTPATIENT
Start: 2022-06-10 | End: 2022-06-11

## 2022-06-10 RX ORDER — BUDESONIDE 180 UG/1
2 AEROSOL, POWDER RESPIRATORY (INHALATION) 2 TIMES DAILY
COMMUNITY
Start: 2022-05-17

## 2022-06-10 RX ADMIN — HYDROMORPHONE HYDROCHLORIDE 0.3 MG: 1 INJECTION, SOLUTION INTRAMUSCULAR; INTRAVENOUS; SUBCUTANEOUS at 05:56

## 2022-06-10 RX ADMIN — METHOCARBAMOL 750 MG: 750 TABLET ORAL at 20:45

## 2022-06-10 RX ADMIN — OXYCODONE HYDROCHLORIDE 5 MG: 5 TABLET ORAL at 20:47

## 2022-06-10 RX ADMIN — METHOCARBAMOL 750 MG: 750 TABLET ORAL at 11:46

## 2022-06-10 RX ADMIN — HYDROMORPHONE HYDROCHLORIDE 0.3 MG: 1 INJECTION, SOLUTION INTRAMUSCULAR; INTRAVENOUS; SUBCUTANEOUS at 13:46

## 2022-06-10 RX ADMIN — HYDROMORPHONE HYDROCHLORIDE 0.3 MG: 1 INJECTION, SOLUTION INTRAMUSCULAR; INTRAVENOUS; SUBCUTANEOUS at 08:22

## 2022-06-10 RX ADMIN — GABAPENTIN 100 MG: 100 CAPSULE ORAL at 20:45

## 2022-06-10 RX ADMIN — SENNOSIDES AND DOCUSATE SODIUM 2 TABLET: 8.6; 5 TABLET ORAL at 20:45

## 2022-06-10 RX ADMIN — GABAPENTIN 100 MG: 100 CAPSULE ORAL at 09:45

## 2022-06-10 RX ADMIN — FLUTICASONE FUROATE 1 PUFF: 100 POWDER RESPIRATORY (INHALATION) at 09:03

## 2022-06-10 RX ADMIN — OXYCODONE HYDROCHLORIDE 10 MG: 5 TABLET ORAL at 09:03

## 2022-06-10 RX ADMIN — ACETAMINOPHEN 975 MG: 325 TABLET, FILM COATED ORAL at 08:21

## 2022-06-10 RX ADMIN — HYDROMORPHONE HYDROCHLORIDE 0.3 MG: 1 INJECTION, SOLUTION INTRAMUSCULAR; INTRAVENOUS; SUBCUTANEOUS at 23:40

## 2022-06-10 RX ADMIN — CALCIUM CARBONATE (ANTACID) CHEW TAB 500 MG 500 MG: 500 CHEW TAB at 22:31

## 2022-06-10 RX ADMIN — ACETAMINOPHEN 975 MG: 325 TABLET, FILM COATED ORAL at 13:42

## 2022-06-10 RX ADMIN — HYDROMORPHONE HYDROCHLORIDE 0.3 MG: 1 INJECTION, SOLUTION INTRAMUSCULAR; INTRAVENOUS; SUBCUTANEOUS at 03:40

## 2022-06-10 RX ADMIN — OXYCODONE HYDROCHLORIDE 5 MG: 5 TABLET ORAL at 06:03

## 2022-06-10 RX ADMIN — OXYCODONE HYDROCHLORIDE 10 MG: 5 TABLET ORAL at 11:46

## 2022-06-10 RX ADMIN — METHOCARBAMOL 750 MG: 750 TABLET ORAL at 08:21

## 2022-06-10 RX ADMIN — SODIUM CHLORIDE 1000 ML: 900 INJECTION, SOLUTION INTRAVENOUS at 00:12

## 2022-06-10 RX ADMIN — LISINOPRIL 20 MG: 20 TABLET ORAL at 09:58

## 2022-06-10 RX ADMIN — POTASSIUM CHLORIDE 40 MEQ: 750 TABLET, EXTENDED RELEASE ORAL at 08:21

## 2022-06-10 RX ADMIN — ACETAMINOPHEN 975 MG: 325 TABLET, FILM COATED ORAL at 20:45

## 2022-06-10 RX ADMIN — SODIUM CHLORIDE 1000 ML: 900 INJECTION, SOLUTION INTRAVENOUS at 13:03

## 2022-06-10 RX ADMIN — GABAPENTIN 100 MG: 100 CAPSULE ORAL at 13:42

## 2022-06-10 RX ADMIN — OXYCODONE HYDROCHLORIDE 10 MG: 5 TABLET ORAL at 14:50

## 2022-06-10 RX ADMIN — CALCIUM CARBONATE (ANTACID) CHEW TAB 500 MG 500 MG: 500 CHEW TAB at 09:49

## 2022-06-10 RX ADMIN — METHOCARBAMOL 750 MG: 750 TABLET ORAL at 15:26

## 2022-06-10 RX ADMIN — SENNOSIDES AND DOCUSATE SODIUM 2 TABLET: 8.6; 5 TABLET ORAL at 08:21

## 2022-06-10 ASSESSMENT — ACTIVITIES OF DAILY LIVING (ADL)
ADLS_ACUITY_SCORE: 30
ADLS_ACUITY_SCORE: 30
ADLS_ACUITY_SCORE: 35
ADLS_ACUITY_SCORE: 30
ADLS_ACUITY_SCORE: 32
ADLS_ACUITY_SCORE: 35
ADLS_ACUITY_SCORE: 30

## 2022-06-10 NOTE — PHARMACY-ADMISSION MEDICATION HISTORY
Admission Medication History Completed by Pharmacy    See [facility] Admission Navigator for allergy information, preferred outpatient pharmacy, prior to admission medications and immunization status.     Medication History Sources:     Patient Interview    [facility]    Changes made to PTA medication list (reason):    Added:   o Flonase    Deleted: None    Changed:   o Added directions for budesonide, azelastine, albuterol    Additional Information:    Patient last filled lisinopril on 4/22 and reports that he hasn't been taking it since he ran out    Prior to Admission medications    Medication Sig Last Dose Taking? Auth Provider   albuterol (PROAIR HFA/PROVENTIL HFA/VENTOLIN HFA) 108 (90 Base) MCG/ACT inhaler Inhale 2 puffs into the lungs every 4 hours as needed  Yes Reported, Patient   azelastine (ASTEPRO) 0.15 % nasal spray Spray 1 spray in nostril 2 times daily 9/9 Yes Reported, Patient   budesonide (PULMICORT FLEXHALER) 180 MCG/ACT inhaler Inhale 2 puffs into the lungs 2 times daily 9/9 Yes Reported, Patient   fluticasone (FLONASE) 50 MCG/ACT nasal spray Spray 2 sprays into both nostrils daily  Yes Unknown, Entered By History   lisinopril (ZESTRIL) 20 MG tablet Take 20 mg by mouth daily Unknown at Unknown time Yes Reported, Patient       Date completed: 06/10/22    Medication history completed by: Clarke Crenshaw [facility]

## 2022-06-10 NOTE — DISCHARGE INSTRUCTIONS
Findings on CT scan: Several indeterminate pulmonary nodules at both lung bases measure 0.4 cm or smaller. Please refer to pulmonary nodule follow-up guidelines below.    Recommendations for one or multiple incidental lung nodules < 6mm:    Low risk patients: No routine follow-up.    High risk patients: Optional follow-up CT at 12 months; if  unchanged, no further follow-up.

## 2022-06-10 NOTE — H&P
[facility]    History and Physical note: Trauma Service     Date of Admission:  6/9/2022    Time of Admission Request (page/call): 2115     Time of my evaluation: 2130  Consulting services:  Neurosurgery - Emergent consult (within 30 mins): Called by ED    Assessment & Plan   Trauma mechanism:Fall  Time/date of injury:06/07/2022  Known Injuries:  1. T12 burst fracture  Other diagnoses: none  Procedure: none    Plan:  1. Admit to trauma surgery  2. Neurosurgery consultation. Appreciate recs  3. Strict bedrest with thoracic/lumbar spine precautions  4. Pain control  5. NPO and IVF        Code status: Full confirmed with patient.     General Cares:  GI Prophylaxis: n/a  DVT Prophylaxis: scd  Date of last stool/Bowel Regimen:06/09/22  Pulmonary toilet:IS    ETOH: This patient was asked if in the last 3-6 months there has been a time when he had  5 or more drinks in a single day/outing.. Patient answer to the screening question was in the negative. No intervention needed.  Primary Care Physician   Son Layton Bermudez    Chief Complaint   Back pain    History is obtained from the patient    History of Present Illness   Kalen Callejas is a 62 year old male who presents with no significant past medical or surgical hx who presented to the ED for evaluation of back pain. Pt states that he was in normal state of health until 2 days ago when he had a ground level fall. Pt choked on a watermelon seed, causing him to cough and slipped falling backwards hitting his lower back against the edge of the cabinet. He was noted to have immediate pain in lower back and has been consistent since then. He, otherwise, is neurologically intact with no headaches, vision changes, nausea/vomiting, numbness/tingling/weakness in any 4 extremities or loss of bowel/bladder control.  Work up in the ED significant for T12 burst fracture.     Past Medical History    None     Past Surgical History    None    Prior to Admission Medications   Lisinopril    Allergies   Allergies   Allergen Reactions     Bee Venom        Social History   Denies smoking, alcohol or illicit drugs     Family History   Family history reviewed with patient and is noncontributory.    Review of Systems   CONSTITUTIONAL: No fever, chills, sweats, fatigue   EYES: no visual blurring, no double vision or visual loss  ENT: no decrease in hearing, no tinnitus, no vertigo, no hoarseness  RESPIRATORY: no shortness of breath, no cough, no sputum   CARDIOVASCULAR: no palpitations, no chest  pain, no exertional chest pain or pressure  GASTROINTESTINAL: no nausea or vomiting, or abd pain  GENITOURINARY: no dysuria, no frequency or hesitancy, no hematuria  MUSCULOSKELETAL: no weakness, no redness, no swelling, no joint pain,   SKIN: no rashes, ecchymoses, abrasions or lacerations  NEUROLOGIC: no numbness or tingling of hands, no numbness or tingling  of feet, no syncope, no tremors or weakness  PSYCHIATRIC: no sleep disturbances, no anxiety or depression    Physical Exam                      Vital Signs with Ranges  Temp:  [98.7  F (37.1  C)] 98.7  F (37.1  C)  Pulse:  [116-132] 116  Resp:  [16-20] 16  BP: (168-182)/(114-120) 182/120  SpO2:  [99 %] 99 % 0 lbs 0 oz    Primary Survey:  Airway: patient talking  Breathing: symmetric respiratory effort bilaterally  Circulation: central pulses present and peripheral pulses present  Disability: Pupils - left 4 mm and brisk, right 4 mm and brisk     Corbin Coma Scale - Total 15/15  Eye Response (E): 4  4= spontaneous,  3= to verbal/voice, 2=  to pain, 1= No response   Verbal Response (V): 5   5= Orientated, converses,  4= Confused, converses, 3= Inappropriate words,  2= Incomprehensible sounds,  1=No response   Motor Response (M): 6   6= Obeys commands, 5= Localizes to pain, 4= Withdrawal to pain, 3=Fexion to pain, 2= Extension to pain, 1= No response    Secondary Survey:  General: alert, oriented to  person, place, time  Head: atraumatic, normocephalic, trachea midline  Eyes: PERRLA, pupils 4 mm, EOMI, corneas and conjunctivae clear  Ears: pearly grey bilateral TMs and non-inflamed external ear canals  Nose: nares patent, no drainage, nasal septum non-tender  Mouth/Throat: no exudates or erythema,  no dental tenderness or malocclusions, no tongue lacerations  Neck:  NO cervical collar present. No midline posterior tenderness, full AROM without pain or tenderness   Chest/Pulmonary: normal respiratory rate and rhythm,  bilateral clear breath sounds, no wheezes, rales or rhonchi, no chest wall tenderness or deformities,   Cardiovascular: S1, S2,  normal and regular rate and rhythm, no murmurs  Abdomen: soft, non-tender, no guarding, no rebound tenderness and no tenderness to palpation  : normal external genitalia, pelvis stable to lateral compression  Back/Spine: focal tenderness in lower thoracic spine, no stepoffs  Musculoskel/Extremities: normal extremities, full AROM of major joints without tenderness, edema, erythema, ecchymosis, or abrasions. Bilateral PP. no edema.   Hand: no gross deformities of hands or fingers. Full AROM of hand and fingers in flexion and extension.  strength equal and symmetric.   Skin: no rashes, laceration, ecchymosis, skin warm and dry.   Neuro: PERRLA, alert, oriented x 4. CN II-XII grossly intact. No focal deficits. Strength 5/5 x 4 extremities.  Sensation intact.  Psychiatric: affect/mood normal, cooperative, normal judgement/insight and memory intact  # Pain Assessment:  Current Pain Score 6/9/2022   Patient currently in pain? denies   Pain score (0-10) -   - Kalen is experiencing pain due to fall. Pain management was discussed and the plan was created in a collaborative fashion.  Kalen's response to the current recommendations: compliant  - Please see the plan for pain management as documented above    Data   Pending    Studies:  IMPRESSION:   1.  Mild age-indeterminate  compression of the T12 vertebral body is  likely acute, and there is associated retropulsion of bony fragments  and significant narrowing of the spinal canal, likely causing some  degree of spinal cord compression.  2.  Diffuse fatty infiltration of the liver.  3.  Nonobstructing 0.3 cm right renal stone.  4.  Mild splenomegaly.  5.  Several indeterminate pulmonary nodules at both lung bases measure  0.4 cm or smaller. Please refer to pulmonary nodule follow-up  guidelines below.    Discussed with Trauma staff on call, Dr. Anthony Martin MD  CVICU/Trauma [facility]

## 2022-06-10 NOTE — PROGRESS NOTES
[facility]   Tertiary Survey Progress Note     Date of Service: 06/10/2022    Trauma Mechanism: Mechanical Fall backwards   Date of Injury: 6/7/22 (2 days prior to admission)  Known Injuries:  1. T12 burst fracture, involving > 50% of spinal canal.       Assessment & Plan   Neuro/Pain/Psych:  # Fall backwards, hit back  # Acute traumatic pain   - Scheduled: Tylenol, Robaxin. Lidoderm   - PRN: dilaudid, oxycodone  - Added Gabapentin for nerve pain associated with fx  - Nurse requested IV fentanyl instead of dilaudid, I discussed doing multimodal pain management first.   - Maintain circadian rhythm. Lights on during the day. Off at night, minimize cares at night. OOB during the day.    Pulmonary:  # Pulmonary nodules   - Supplemental oxygen to keep saturation above 92 %.  - Incentive spirometer while awake   - CT: Several indeterminate pulmonary nodules at both lung bases measure 0.4 cm or smaller. Please refer to pulmonary nodule follow-up guidelines below. Placed follow-up recs on discharge paperwork.     Cardiovascular:    # Hypertension   - Monitor hemodynamic status.   - continue PTA: Lisinopril     GI/Nutrition:    # Diffuse fatty infiltration of the liver, Mild splenomegaly on CT  # GERD  - Added prn TUMs  - Npo for surgery      Renal/ Fluids/Electrolytes:  - CT findings of Nonobstructing 0.3 cm right renal stone.   - NS for IV fluid hydration.   - electrolyte replacement protocol in place.     Endocrine:  # Stress hyperglycemia   - No management indication. Sliding scale for glucose management. Goal to keep BG < 180 for optimal wound healing     Infectious disease:   -  No indications for antibiotics.     Hematology:    - Hgb 14.8. Monitor and trend.   - Threshold for transfusion if hgb <7.0 or signs/symptoms of hypoperfusion.       Musculoskeletal:  # T12 vertebral body   Abdominal CT: Mild age-indeterminate compression of the T12 vertebral body is likely acute,  and there is associated retropulsion of bony fragments and significant narrowing of the spinal canal, likely causing some degree of spinal cord compression.  Neurosurgery following:     Lumbar MRI without contrast (ordered for you for a.m.)    TLSO, thoracic/lumbar precautions until brace arrives, Upright XR    Will discuss surgical planning with staff in a.m.  - Physical and occupational therapy consults.    Skin:  - dilgent cares to prevent skin breakdown and wound formation.      Lines/ tubes/ drains:  - PIV     Code status:  Full      Expected D/C date: Pending Neurosurgery plan    Mariann Snowden PA-C  To contact the trauma service use job code pager [phone],   Numeric texts or alpha text through [facility]      Interval History   Review of Systems   Skin: negative  Eyes: negative  Ears/Nose/Throat: negative  Respiratory: No shortness of breath, dyspnea on exertion, cough, or hemoptysis  Cardiovascular: negative  Gastrointestinal: negative  Genitourinary: negative  Musculoskeletal: positive for fracture and back pain  Neurologic: negative  Psychiatric: negative  Hematologic/Lymphatic/Immunologic: negative  Endocrine: negative     Physical Exam   [place] Coma Scale - Total 15/15  Eye Response (E): 4   4= spontaneous, 3= to verbal/voice, 2= to pain, 1= No response   Verbal Response (V): 5   5= Orientated, converses, 4= Confused, converses, 3= Inappropriate words, 2= Incomprehensible sounds, 1=No response   Motor Response (M): 6   6= Obeys commands, 5= Localizes to pain, 4= Withdrawal to pain, 3=Fexion to pain, 2= Extension to pain, 1= No response     Frailty Questionnaire: To be done for all patients age 60+  F (Fatigue): Is the patient easily fatigued? NO = 0  R (Resistance): Is the patient unable to walk one flight of stairs? NO = 0  A (Ambulation): Is the patient unable to walk one block? NO = 0  I  (Illness): Does the patient have more than five illnesses? NO = 0  L (Loss of weight): Has the patient lost more than  5% of weight in the past 6 months. NO = 0  Lost five pounds or more in the last 3 months without trying? AND/OR Unintended weight loss?  Does the patient have difficulty performing housework such as washing windows or scrubbing floors? AND Activity in a typical 24-hour day- No moderate or vigorous activity    Score: 0    Score: 0-2: Ensure appropriate therapies consulted if needed     Physical Exam  Constitutional: Awake, alert, cooperative, no apparent distress.  Eyes: Lids and lashes normal, PERRL, EOMI. sclera clear, conjunctiva normal.  HENT: Normocephalic, atraumatic  Respiratory: No increased work of breathing, good air exchange, clear to auscultation bilaterally, no crackles or wheezing.  Cardiovascular:  regular rate and rhythm, normal S1 and S2,  GI: Abdomen soft, non-distended, non-tender,  Genitourinary:  Voids independently   Skin:  Warm & dry  Musculoskeletal: There is no redness, warmth, or swelling of the joints.  Pedal pulse palpated.  Neurologic: Awake, alert, oriented. Cranial nerves II-XII are grossly intact.  Strength and sensory is intact. No focal deficits.  Neuropsychiatric: Calm, normal eye contact, alert, affect appropriate to situation, oriented, thought process normal.    Temp: 98.5  F (36.9  C) Temp src: Oral BP: 134/86 Pulse: 96   Resp: 14 SpO2: 99 % O2 Device: None (Room air)    Vitals:    06/10/22 0315   Weight: 131.5 kg (289 lb 14.5 oz)     Vital Signs with Ranges  Temp:  [98.2  F (36.8  C)-98.7  F (37.1  C)] 98.5  F (36.9  C)  Pulse:  [] 96  Resp:  [14-20] 14  BP: (121-182)/() 134/86  SpO2:  [97 %-99 %] 99 %  No intake/output data recorded.

## 2022-06-10 NOTE — PROGRESS NOTES
[facility] [place]     Neurosurgery Progress Note:  06/10/2022      Interval History: Pain remains under control.    Assessment:  62-year-old male with T12 burst fracture. Neurologically intact on exam.  Burst component appears to involve greater than 50% of spinal canal.  Will need MRI to better visualize degree of compression however will likely require decompression and fusion in this admission given the unstable nature.    Clinically Significant Risk Factors Present on Admission                    Recommendations:  Lumbar MRI without contrast (ordered for you for a.m.)  TLSO, thoracic/lumbar precautions until brace arrives  Upright XR  Will discuss surgical planning with staff in a.m.    -----------------------------------  Nyasia Mccoy MD  Neurosurgery resident, PGY-2  -----------------------------------      General: Nonacute distress, appearing stated age, slightly anxious regarding this fracture and transfer  HEENT: Atraumatic, normocephalic  PULM: Breathing comfortably on room air  MSK: Midline and paraspinal thoracic lower thoracic/upper lumbar tenderness  NEUROLOGIC:  -- Awake; Alert; oriented x 3  -- Follows commands briskly  -- +repetition, calculation, and naming  -- Speech fluent, spontaneous. No aphasia or dysarthria.  -- no gaze preference. No apparent hemineglect.  Cranial Nerves:  -- visual fields full to confrontation, PERRL 3-2mm bilat and brisk, extraocular movements intact  -- face symmetrical, tongue midline  -- sensory V1-V3 intact bilaterally  -- palate elevates symmetrically, uvula midline  -- hearing grossly intact bilat  -- Trapezii 5/5 strength bilat symmetric  -- Cerebellar: Finger nose finger without dysmetria, intact rapid alternating motions bilaterally     Motor:  Normal bulk / tone; no tremor, rigidity, or bradykinesia.  No muscle wasting or fasciculations  No Pronator Drift       Delt Bi Tri Hand Flexion/  Extension Iliopsoas Quadriceps  Hamstrings Tibialis Anterior Gastroc     C5 C6 C7 C8/T1 L2 L3 L4-S1 L4 S1   R 5 5 5 5 5 5 5 5 5   L 5 5 5 5 5 5 5 5 5      Sensory:  intact to LT x 4 extremities        Reflexes: no clonus       Bi Tri BR Jarad Pat Ach Bab     C5-6 C7-8 C6 UMN L2-4 S1 UMN   R 2+ 2+ 2+ Norm 2+ 2+ Norm   L 2+ 2+ 2+ Norm 2+ 2+ Norm      Gait: deferred     Objective:   Temp:  [98.2  F (36.8  C)-98.7  F (37.1  C)] 98.5  F (36.9  C)  Pulse:  [] 96  Resp:  [14-20] 14  BP: (121-182)/() 134/86  SpO2:  [97 %-99 %] 99 %  No intake/output data recorded.        LABS:  Recent Labs   Lab 06/10/22  0344 06/09/22  2128   NA  --  139   POTASSIUM  --  3.5   CHLORIDE  --  104   CO2  --  25   ANIONGAP  --  10   * 156*   BUN  --  13   CR  --  0.66   MARGE  --  9.0       Recent Labs   Lab 06/09/22 2128   WBC 7.8   RBC 4.58   HGB 16.1   HCT 46.5   *   MCH 35.2*   MCHC 34.6   RDW 12.0          IMAGING:  Recent Results (from the past 24 hour(s))   CT Abdomen Pelvis w/o Contrast   Result Value    Radiologist flags T12 compression fracture with associated narrowing (AA)    Narrative    CT ABDOMEN AND PELVIS WITHOUT CONTRAST  6/9/2022 3:51 PM    CLINICAL HISTORY: Fall. Back pain.    TECHNIQUE: CT scan of the abdomen and pelvis was performed without IV  contrast. Multiplanar reformats were obtained. Dose reduction  techniques were used.  CONTRAST: None.    COMPARISON: None.    FINDINGS:   LOWER CHEST: A few small indeterminate pulmonary nodules are noted at  both lung bases, with the largest in the left lower lobe (series 4  image 11) measuring 0.4 cm.    HEPATOBILIARY: Diffuse fatty infiltration of the liver. No hepatic  masses are seen. No calcified gallstones.    PANCREAS: Normal.    SPLEEN: Mild splenomegaly. The spleen measures 16.4 cm in length.    ADRENAL GLANDS: Normal.    KIDNEYS/BLADDER: Nonobstructing 0.3 cm stone in the interpolar region  of the right kidney. No ureteral calculi or hydronephrosis.    BOWEL: No bowel  obstruction. No convincing evidence for colitis or  diverticulitis. Unremarkable appendix.    PELVIC ORGANS: Mild prostatic enlargement and central calcification.  Small amount of nonspecific free fluid in the pelvis.    LYMPH NODES: No enlarged lymph nodes are identified in the abdomen or  pelvis.    VASCULATURE: Mild to moderate atherosclerotic aortoiliac  calcification.    ADDITIONAL FINDINGS: Small fat-containing paraumbilical hernia.    MUSCULOSKELETAL: Mild age-indeterminate compression of the T12  vertebral body is likely acute. There is retropulsion of bony  fragments, and greater than 50% associated narrowing of the spinal  canal at this level (series 4 image 58). Old right eleventh rib  fracture posteriorly.      Impression    IMPRESSION:   1.  Mild age-indeterminate compression of the T12 vertebral body is  likely acute, and there is associated retropulsion of bony fragments  and significant narrowing of the spinal canal, likely causing some  degree of spinal cord compression.  2.  Diffuse fatty infiltration of the liver.  3.  Nonobstructing 0.3 cm right renal stone.  4.  Mild splenomegaly.  5.  Several indeterminate pulmonary nodules at both lung bases measure  0.4 cm or smaller. Please refer to pulmonary nodule follow-up  guidelines below.    Recommendations for one or multiple incidental lung nodules < 6mm:    Low risk patients: No routine follow-up.    High risk patients: Optional follow-up CT at 12 months; if  unchanged, no further follow-up.    *Low Risk: Minimal or absent history of smoking or other known risk  factors.  *Nonsolid (ground glass) or partly solid nodules may require longer  follow-up to exclude indolent adenocarcinoma.  *Recommendations based on Guidelines for the Management of Incidental  Pulmonary Nodules Detected at CT: From the Fleischner Society 2017,  Radiology 2017.  *Guidelines apply to incidental nodules in patients who are 35 years  or older.  *Guidelines do not apply to  lung cancer screening, patients with  immunosuppression, or patients with known primary cancer.    [Critical Result: T12 compression fracture with associated narrowing  of the spinal canal]    Finding was identified on 6/9/2022 3:59 PM.     Dr. Moura was contacted by me on 6/9/2022 4:11 PM and verbalized  understanding of the critical result.     RICHY MUNOZ MD         SYSTEM ID:  [id number]     I have reviewed the history above and agree with the resident's assessment and plan.  IAM Buckner MD

## 2022-06-10 NOTE — PROGRESS NOTES
S: [facility] seen [facility]-02 in good spirits. O: I see the EPIC order for the CUSTOM TLSO due to T-12 Burst Fx. A: Measurements were taken for a custom TLSO as ordered. P: We will see him tomorrow after 1 pm for delivery and fitting of the custom TLSO measured today. G: The goal is to reduce vertical forces on the spine and to restrict motion during healing.  Electronically signed Arslan Saunders , LPO.

## 2022-06-10 NOTE — PROVIDER NOTIFICATION
Provider notified: Ok Corona  Time notified: 0819  Message: 6B Annoni, NPO but has order for miralax. Why is he NPO? Can I mix miralax with 4 oz of liquid to administer? -Mica MARIE [id number]  Time notified: 0831  Message: 6B Annoni, strict flat bedrest? fentanyl instead of dilaudid?pt states fentanyl works better than dilaudid. also has heartburn, prns and preventative please? -Mica MARIE [id number]  Response: Trauma is primary    Provider notified: Trauma  Time notified: 0836  Message: 6B Annoni, NPO but has order for miralax. Can I mix with 4 oz to administer? strict flat bedrest? fentanyl instead of dilaudid? pt states fentanyl works better than dilaudid. also has heartburn, prns? Mica MARIE [id number]  Response: TUMs and multimodal pain meds ordered, must remain NPO and flat, do not give miralax today

## 2022-06-10 NOTE — PLAN OF CARE
Admitted/transferred from: ER  Reason for admission/transfer: T12 burst fracture  2 RN skin assessment: completed by Melissa  Result of skin assessment and interventions/actions: generalized bruising  Height, weight, drug calc weight: Done  Patient belongings (see Flowsheet)  MDRO education added to care planN/A  ?

## 2022-06-10 NOTE — ED PROVIDER NOTES
ED Provider Note  [facility]      History     Chief Complaint   Patient presents with     Back Injury     HPI  Kalen Callejas is a 62 year old male who presents to the emergency department as a transfer to from [facility] emergency department where he was seen for back pain after an injury sustained 5 days ago.  Patient was at home standing in his kitchen.  He took a bite of watermelon and choked.  He states that he fell backwards and hyperextended his back when he struck his lower back on the counter in the kitchen.  He states that he then slid to the floor and had immediate pain in the low back.  He went to [facility] emergency department today due to ongoing symptoms and was found to have a T12 burst fracture with retropulsion.  He denies any weakness or numbness.  He denies any difficulty with bowel or bladder function.  He denies any abdominal pain.  No chest pain or dyspnea.  No head injury or loss conscious.  No neck pain.  The patient is not on any anticoagulants.  COVID test was negative at [facility].    No past medical history on file.         Review of Systems   Constitutional: Negative for fever.   HENT: Negative for congestion and sore throat.    Eyes: Negative for redness.   Respiratory: Negative for cough and shortness of breath.    Cardiovascular: Negative for chest pain.   Gastrointestinal: Negative for abdominal pain, nausea and vomiting.   Genitourinary: Negative for difficulty urinating.   Musculoskeletal: Positive for back pain. Negative for neck pain.   Skin: Negative for rash.   Neurological: Negative for weakness and headaches.   Psychiatric/Behavioral: Negative for dysphoric mood, sleep disturbance and suicidal ideas.   All other systems reviewed and are negative.    A complete review of systems was performed with pertinent positives and negatives noted in the HPI, and all other systems negative.    Physical Exam   BP: (!) 136/102  Pulse:  118  SpO2: 98 %  Physical Exam  Vitals and nursing note reviewed.   Constitutional:       General: He is not in acute distress.     Appearance: He is not diaphoretic.   HENT:      Head: Normocephalic and atraumatic.   Eyes:      General: No scleral icterus.     Pupils: Pupils are equal, round, and reactive to light.   Cardiovascular:      Rate and Rhythm: Normal rate and regular rhythm.      Pulses: Normal pulses.      Heart sounds: Normal heart sounds.   Pulmonary:      Effort: Pulmonary effort is normal. No respiratory distress.      Breath sounds: Normal breath sounds.   Abdominal:      General: Bowel sounds are normal.      Palpations: Abdomen is soft.      Tenderness: There is no abdominal tenderness.   Musculoskeletal:      Cervical back: Normal range of motion.      Thoracic back: Tenderness present.      Lumbar back: Tenderness present.        Back:    Skin:     General: Skin is warm.      Findings: No rash.   Neurological:      General: No focal deficit present.      Mental Status: He is alert.      Sensory: No sensory deficit.      Motor: No weakness.      Deep Tendon Reflexes: Reflexes normal.         ED Course      Procedures       The medical record was reviewed and interpreted.  Current images reviewed and interpreted: T12 burst fracture with retropulsed fragments..          EKG Interpretation:      Interpreted by INOCENTE BRIGHT MD, MD  Time reviewed:2158   Symptoms at time of EKG: tachycardia   Rhythm: Normal sinus  and Sinus tachycardia  Rate: 124  Axis: Normal  Ectopy: None  Conduction: Normal  ST Segments/ T Waves: No acute ischemic changes  Q Waves: III and aVf  Comparison to prior: No old EKG available    Clinical Impression: non-specific EKG           Results for orders placed or performed during the hospital encounter of 06/09/22   Basic metabolic panel     Status: Abnormal   Result Value Ref Range    Sodium 139 133 - 144 mmol/L    Potassium 3.5 3.4 - 5.3 mmol/L    Chloride 104 94 - 109 mmol/L     Carbon Dioxide (CO2) 25 20 - 32 mmol/L    Anion Gap 10 3 - 14 mmol/L    Urea Nitrogen 13 7 - 30 mg/dL    Creatinine 0.66 0.66 - 1.25 mg/dL    Calcium 9.0 8.5 - 10.1 mg/dL    Glucose 156 (H) 70 - 99 mg/dL    GFR Estimate >90 >60 mL/min/1.73m2   INR     Status: Normal   Result Value Ref Range    INR 1.07 0.85 - 1.15   CBC with platelets and differential     Status: Abnormal   Result Value Ref Range    WBC Count 7.8 4.0 - 11.0 10e3/uL    RBC Count 4.58 4.40 - 5.90 10e6/uL    Hemoglobin 16.1 13.3 - 17.7 g/dL    Hematocrit 46.5 40.0 - 53.0 %     (H) 78 - 100 fL    MCH 35.2 (H) 26.5 - 33.0 pg    MCHC 34.6 31.5 - 36.5 g/dL    RDW 12.0 10.0 - 15.0 %    Platelet Count 198 150 - 450 10e3/uL    % Neutrophils 72 %    % Lymphocytes 14 %    % Monocytes 11 %    % Eosinophils 2 %    % Basophils 1 %    % Immature Granulocytes 0 %    NRBCs per 100 WBC 0 <1 /100    Absolute Neutrophils 5.6 1.6 - 8.3 10e3/uL    Absolute Lymphocytes 1.1 0.8 - 5.3 10e3/uL    Absolute Monocytes 0.8 0.0 - 1.3 10e3/uL    Absolute Eosinophils 0.2 0.0 - 0.7 10e3/uL    Absolute Basophils 0.1 0.0 - 0.2 10e3/uL    Absolute Immature Granulocytes 0.0 <=0.4 10e3/uL    Absolute NRBCs 0.0 10e3/uL   Magnesium     Status: Normal   Result Value Ref Range    Magnesium 2.1 1.6 - 2.3 mg/dL   EKG 12-lead, tracing only     Status: None (Preliminary result)   Result Value Ref Range    Systolic Blood Pressure  mmHg    Diastolic Blood Pressure  mmHg    Ventricular Rate 124 BPM    Atrial Rate 124 BPM    AR Interval 154 ms    QRS Duration 74 ms     ms    QTc 465 ms    P Axis 55 degrees    R AXIS 10 degrees    T Axis 25 degrees    Interpretation ECG       Sinus tachycardia  Possible Inferior infarct , age undetermined  Abnormal ECG     CBC with platelets differential     Status: Abnormal    Narrative    The following orders were created for panel order CBC with platelets differential.  Procedure                               Abnormality         Status                      ---------                               -----------         ------                     CBC with platelets and d...[[id number]]  Abnormal            Final result                 Please view results for these tests on the individual orders.     Medications   sodium chloride 0.9% infusion (has no administration in time range)   acetaminophen (TYLENOL) tablet 975 mg (975 mg Oral Given 6/9/22 2352)   Lidocaine (LIDOCARE) 4 % Patch 1 patch (1 patch Transdermal Patch/Med Applied 6/9/22 2352)     And   lidocaine patch in PLACE ( Transdermal Patch in Place 6/10/22 2352)   HYDROmorphone (PF) (DILAUDID) injection 0.3 mg (has no administration in time range)   senna-docusate (SENOKOT-S/PERICOLACE) 8.6-50 MG per tablet 2 tablet (2 tablets Oral Not Given 6/9/22 2353)   polyethylene glycol (MIRALAX) Packet 17 g (has no administration in time range)   methocarbamol (ROBAXIN) tablet 500 mg (500 mg Oral Given 6/9/22 2352)   ondansetron (ZOFRAN ODT) ODT tab 4 mg (has no administration in time range)     Or   ondansetron (ZOFRAN) injection 4 mg (has no administration in time range)   oxyCODONE (ROXICODONE) tablet 5-10 mg (5 mg Oral Given 6/9/22 2352)   HYDROmorphone (PF) (DILAUDID) injection 0.5 mg (0.5 mg Intravenous Given 6/9/22 2122)   lisinopril (ZESTRIL) tablet 20 mg (20 mg Oral Given 6/9/22 2352)        Assessments & Plan (with Medical Decision Making)   62 year old male with T12 burst fracture that he sustained when he fell backwards over a counter 5 days ago.  He has a T12 burst fracture with retropulsed fragment on CT scan.  The patient has a normal neurologic examination.  He does not have any other evidence for injury.  Patient was seen by trauma and neurosurgery.  He will be admitted to the trauma surgery service for further evaluation and management.    I have reviewed the nursing notes. I have reviewed the findings, diagnosis, plan and need for follow up with the patient.    New Prescriptions    No medications on file        Final diagnoses:   T12 burst fracture (H)     Chart documentation was completed with Dragon voice-recognition software. Even though reviewed, this chart may still contain some grammatical, spelling, and word errors.     --  Silvino Whitney Md  [facility] EMERGENCY DEPARTMENT  6/9/2022     Silvino Whitney MD  06/10/22 0005

## 2022-06-10 NOTE — PLAN OF CARE
Major Shift Events:  Pt vitally stable on room air. Neuros intact. Good uop with urinal, no BM. Having high back pain, giving dilaudid and oxy. Potassium 3.4 and being replaced.    Plan: MRI of spine    For vital signs and complete assessments, please see documentation flowsheets.

## 2022-06-10 NOTE — CONSULTS
[facility]         NEUROSURGERY CONSULTATION      This consultation was requested by Dr. Whitney from the ED service.      Time Paged/Notified: 9:39 PM  Trauma Activation: Yes  Arrival time in ED: 9:39 PM    GCS: 15      Reason for Consultation: T12 burst fracture    HPI:    62-year-old male, past medical history of hypertension, presenting with worsening thoracolumbar back pain after bumping his back into a kitchen counter on Sunday found to have a T12 burst fracture.    Patient reports he was eating a snack (a cut up watermelon) when he accidentally inhaled some of the food and then stumbled backwards forcefully bumping his back into the edge of the counter.  He immediately felt excruciating back pain and thoracolumbar area.  He sat down and the pain slowly subsided however did not fully go away.  The pain continued throughout the the next several days and was slowly worsening.  Due to his fear of doctors in the hospital setting, he was slow to come in.  Family decided to come in on Thursday, 6/9/2022 evening to [facility] where he was found to have a T12 burst fracture and subsequently transferred to [facility] for further work-up.    Currently endorses thoracolumbar area tenderness to palpation at midline and paraspinal.  He denies any radicular or shooting quality to his pain.  He denies any numbness or tingling in his extremities or in his abdomen.  Denies any bowel or bladder symptoms.  He denies any weakness and has been walking around although with pain related difficulty since his incident on Sunday.      PAST MEDICAL HISTORY: No past medical history on file.  Hypertension.   -Controlled  -Continue current medications    PAST SURGICAL HISTORY: No past surgical history on file.    FAMILY HISTORY: No family history on file.    SOCIAL HISTORY:   Social History     Tobacco Use     Smoking status: Not on file     Smokeless tobacco: Not on file   Substance Use  Topics     Alcohol use: Not on file       MEDICATIONS:  Current Outpatient Medications   Medication Sig Dispense Refill     lisinopril (ZESTRIL) 20 MG tablet Take 20 mg by mouth daily         Allergies:  Allergies   Allergen Reactions     Bee Venom        ROS: 10 point ROS of systems including Constitutional, Eyes, Respiratory, Cardiovascular, Gastroenterology, Genitourinary, Integumentary, Muscularskeletal, Psychiatric were all negative except for pertinent positives noted in my HPI.    Physical exam:   Blood pressure (!) 145/101, pulse 98, SpO2 97 %.  General: Nonacute distress, appearing stated age, slightly anxious regarding this fracture and transfer  HEENT: Atraumatic, normocephalic  PULM: Breathing comfortably on room air  MSK: Midline and paraspinal thoracic lower thoracic/upper lumbar tenderness  NEUROLOGIC:  -- Awake; Alert; oriented x 3  -- Follows commands briskly  -- +repetition, calculation, and naming  -- Speech fluent, spontaneous. No aphasia or dysarthria.  -- no gaze preference. No apparent hemineglect.  Cranial Nerves:  -- visual fields full to confrontation, PERRL 3-2mm bilat and brisk, extraocular movements intact  -- face symmetrical, tongue midline  -- sensory V1-V3 intact bilaterally  -- palate elevates symmetrically, uvula midline  -- hearing grossly intact bilat  -- Trapezii 5/5 strength bilat symmetric  -- Cerebellar: Finger nose finger without dysmetria, intact rapid alternating motions bilaterally    Motor:  Normal bulk / tone; no tremor, rigidity, or bradykinesia.  No muscle wasting or fasciculations  No Pronator Drift     Delt Bi Tri Hand Flexion/  Extension Iliopsoas Quadriceps Hamstrings Tibialis Anterior Gastroc    C5 C6 C7 C8/T1 L2 L3 L4-S1 L4 S1   R 5 5 5 5 5 5 5 5 5   L 5 5 5 5 5 5 5 5 5     Sensory:  intact to LT x 4 extremities      Reflexes: no clonus       Bi Tri BR Jarad Pat Ach Bab     C5-6 C7-8 C6 UMN L2-4 S1 UMN   R 2+ 2+ 2+ Norm 2+ 2+ Norm   L 2+ 2+ 2+ Norm 2+ 2+ Norm       Gait: deferred       IMAGING:  Recent Results (from the past 24 hour(s))   CT Abdomen Pelvis w/o Contrast   Result Value    Radiologist flags T12 compression fracture with associated narrowing (AA)    Narrative    CT ABDOMEN AND PELVIS WITHOUT CONTRAST  6/9/2022 3:51 PM    CLINICAL HISTORY: Fall. Back pain.    TECHNIQUE: CT scan of the abdomen and pelvis was performed without IV  contrast. Multiplanar reformats were obtained. Dose reduction  techniques were used.  CONTRAST: None.    COMPARISON: None.    FINDINGS:   LOWER CHEST: A few small indeterminate pulmonary nodules are noted at  both lung bases, with the largest in the left lower lobe (series 4  image 11) measuring 0.4 cm.    HEPATOBILIARY: Diffuse fatty infiltration of the liver. No hepatic  masses are seen. No calcified gallstones.    PANCREAS: Normal.    SPLEEN: Mild splenomegaly. The spleen measures 16.4 cm in length.    ADRENAL GLANDS: Normal.    KIDNEYS/BLADDER: Nonobstructing 0.3 cm stone in the interpolar region  of the right kidney. No ureteral calculi or hydronephrosis.    BOWEL: No bowel obstruction. No convincing evidence for colitis or  diverticulitis. Unremarkable appendix.    PELVIC ORGANS: Mild prostatic enlargement and central calcification.  Small amount of nonspecific free fluid in the pelvis.    LYMPH NODES: No enlarged lymph nodes are identified in the abdomen or  pelvis.    VASCULATURE: Mild to moderate atherosclerotic aortoiliac  calcification.    ADDITIONAL FINDINGS: Small fat-containing paraumbilical hernia.    MUSCULOSKELETAL: Mild age-indeterminate compression of the T12  vertebral body is likely acute. There is retropulsion of bony  fragments, and greater than 50% associated narrowing of the spinal  canal at this level (series 4 image 58). Old right eleventh rib  fracture posteriorly.      Impression    IMPRESSION:   1.  Mild age-indeterminate compression of the T12 vertebral body is  likely acute, and there is associated  retropulsion of bony fragments  and significant narrowing of the spinal canal, likely causing some  degree of spinal cord compression.  2.  Diffuse fatty infiltration of the liver.  3.  Nonobstructing 0.3 cm right renal stone.  4.  Mild splenomegaly.  5.  Several indeterminate pulmonary nodules at both lung bases measure  0.4 cm or smaller. Please refer to pulmonary nodule follow-up  guidelines below.    Recommendations for one or multiple incidental lung nodules < 6mm:    Low risk patients: No routine follow-up.    High risk patients: Optional follow-up CT at 12 months; if  unchanged, no further follow-up.    *Low Risk: Minimal or absent history of smoking or other known risk  factors.  *Nonsolid (ground glass) or partly solid nodules may require longer  follow-up to exclude indolent adenocarcinoma.  *Recommendations based on Guidelines for the Management of Incidental  Pulmonary Nodules Detected at CT: From the Fleischner Society 2017,  Radiology 2017.  *Guidelines apply to incidental nodules in patients who are 35 years  or older.  *Guidelines do not apply to lung cancer screening, patients with  immunosuppression, or patients with known primary cancer.    [Critical Result: T12 compression fracture with associated narrowing  of the spinal canal]    Finding was identified on 6/9/2022 3:59 PM.     Dr. Moura was contacted by me on 6/9/2022 4:11 PM and verbalized  understanding of the critical result.     RICHY MUNOZ MD         SYSTEM ID:  [id number]           LABS:   Last Comprehensive Metabolic Panel:  Sodium   Date Value Ref Range Status   06/09/2022 139 133 - 144 mmol/L Final     Potassium   Date Value Ref Range Status   06/09/2022 3.5 3.4 - 5.3 mmol/L Final     Chloride   Date Value Ref Range Status   06/09/2022 104 94 - 109 mmol/L Final     Carbon Dioxide (CO2)   Date Value Ref Range Status   06/09/2022 25 20 - 32 mmol/L Final     Anion Gap   Date Value Ref Range Status   06/09/2022 10 3 - 14 mmol/L Final      Glucose   Date Value Ref Range Status   06/09/2022 156 (H) 70 - 99 mg/dL Final     Urea Nitrogen   Date Value Ref Range Status   06/09/2022 13 7 - 30 mg/dL Final     Creatinine   Date Value Ref Range Status   06/09/2022 0.66 0.66 - 1.25 mg/dL Final     GFR Estimate   Date Value Ref Range Status   06/09/2022 >90 >60 mL/min/1.73m2 Final     Comment:     Effective December 21, 2021 eGFRcr in adults is calculated using the 2021 CKD-EPI creatinine equation which includes age and gender (Hanna et al., NEJ, DOI: 10.1056/FNYSmd6520558)     Calcium   Date Value Ref Range Status   06/09/2022 9.0 8.5 - 10.1 mg/dL Final     Lab Results   Component Value Date    WBC 7.8 06/09/2022     Lab Results   Component Value Date    RBC 4.58 06/09/2022     Lab Results   Component Value Date    HGB 16.1 06/09/2022     Lab Results   Component Value Date    HCT 46.5 06/09/2022     Lab Results   Component Value Date     06/09/2022     Lab Results   Component Value Date    MCH 35.2 06/09/2022     Lab Results   Component Value Date    MCHC 34.6 06/09/2022     Lab Results   Component Value Date    RDW 12.0 06/09/2022     Lab Results   Component Value Date     06/09/2022     INR   Date Value Ref Range Status   06/09/2022 1.07 0.85 - 1.15 Final    a  No results found for: PTT   @Fibrinogen1@    ASSESSMENT: 62-year-old male with T12 burst fracture.  Intact neurologically on exam.  Burst component appears to involve greater than 50% of spinal canal.  Will need MRI to better visualize degree of compression however may require decompression and fusion in this admission.  Clinically Significant Risk Factors Present on Admission                       RECOMMENDATIONS:  No urgent neurosurgical intervention indicated. However, we will be evaluating the patient for spinal instrumentation and fusion.  Lumbar MRI without contrast (ordered for you for a.m.)  TLSO, thoracic/lumbar precautions until brace arrives  Upright XR    Nyasia Mccoy  MD  Neurosurgery Resident, PGY-2    The patient was discussed with Dr. Caraballo, neurosurgery chief resident.    I have reviewed the history above and agree with the resident's assessment and plan.  IAM Buckner MD

## 2022-06-11 ENCOUNTER — APPOINTMENT (OUTPATIENT)
Dept: GENERAL RADIOLOGY | Facility: CLINIC | Age: 62
End: 2022-06-11
Attending: PHYSICIAN ASSISTANT
Payer: COMMERCIAL

## 2022-06-11 ENCOUNTER — APPOINTMENT (OUTPATIENT)
Dept: PHYSICAL THERAPY | Facility: CLINIC | Age: 62
End: 2022-06-11
Attending: PHYSICIAN ASSISTANT
Payer: COMMERCIAL

## 2022-06-11 LAB
GASTROCULT GAST QL: POSITIVE
GLUCOSE BLDC GLUCOMTR-MCNC: 166 MG/DL (ref 70–99)
HGB BLD-MCNC: 16.3 G/DL (ref 13.3–17.7)
HOLD SPECIMEN: NORMAL
MAGNESIUM SERPL-MCNC: 2.1 MG/DL (ref 1.6–2.3)
PH GAST: ABNORMAL [PH]
PHOSPHATE SERPL-MCNC: 2.9 MG/DL (ref 2.5–4.5)
POTASSIUM BLD-SCNC: 4.4 MMOL/L (ref 3.4–5.3)

## 2022-06-11 PROCEDURE — 250N000013 HC RX MED GY IP 250 OP 250 PS 637: Performed by: PHYSICIAN ASSISTANT

## 2022-06-11 PROCEDURE — 74018 RADEX ABDOMEN 1 VIEW: CPT | Mod: 26 | Performed by: RADIOLOGY

## 2022-06-11 PROCEDURE — 97116 GAIT TRAINING THERAPY: CPT | Mod: GP

## 2022-06-11 PROCEDURE — 84132 ASSAY OF SERUM POTASSIUM: CPT | Performed by: SURGERY

## 2022-06-11 PROCEDURE — C9113 INJ PANTOPRAZOLE SODIUM, VIA: HCPCS | Performed by: PHYSICIAN ASSISTANT

## 2022-06-11 PROCEDURE — 72080 X-RAY EXAM THORACOLMB 2/> VW: CPT | Mod: 26 | Performed by: RADIOLOGY

## 2022-06-11 PROCEDURE — 72080 X-RAY EXAM THORACOLMB 2/> VW: CPT

## 2022-06-11 PROCEDURE — 250N000011 HC RX IP 250 OP 636: Performed by: PHYSICIAN ASSISTANT

## 2022-06-11 PROCEDURE — 120N000003 HC R&B IMCU UMMC

## 2022-06-11 PROCEDURE — 82271 OCCULT BLOOD OTHER SOURCES: CPT | Performed by: PHYSICIAN ASSISTANT

## 2022-06-11 PROCEDURE — 36415 COLL VENOUS BLD VENIPUNCTURE: CPT | Performed by: PHYSICIAN ASSISTANT

## 2022-06-11 PROCEDURE — 84100 ASSAY OF PHOSPHORUS: CPT | Performed by: SURGERY

## 2022-06-11 PROCEDURE — 250N000013 HC RX MED GY IP 250 OP 250 PS 637: Performed by: STUDENT IN AN ORGANIZED HEALTH CARE EDUCATION/TRAINING PROGRAM

## 2022-06-11 PROCEDURE — 85018 HEMOGLOBIN: CPT | Performed by: PHYSICIAN ASSISTANT

## 2022-06-11 PROCEDURE — 258N000003 HC RX IP 258 OP 636: Performed by: STUDENT IN AN ORGANIZED HEALTH CARE EDUCATION/TRAINING PROGRAM

## 2022-06-11 PROCEDURE — 258N000003 HC RX IP 258 OP 636: Performed by: PHYSICIAN ASSISTANT

## 2022-06-11 PROCEDURE — 99232 SBSQ HOSP IP/OBS MODERATE 35: CPT | Performed by: PHYSICIAN ASSISTANT

## 2022-06-11 PROCEDURE — 36415 COLL VENOUS BLD VENIPUNCTURE: CPT | Performed by: SURGERY

## 2022-06-11 PROCEDURE — 97162 PT EVAL MOD COMPLEX 30 MIN: CPT | Mod: GP

## 2022-06-11 PROCEDURE — 83735 ASSAY OF MAGNESIUM: CPT | Performed by: SURGERY

## 2022-06-11 PROCEDURE — 74018 RADEX ABDOMEN 1 VIEW: CPT

## 2022-06-11 PROCEDURE — 250N000011 HC RX IP 250 OP 636: Performed by: STUDENT IN AN ORGANIZED HEALTH CARE EDUCATION/TRAINING PROGRAM

## 2022-06-11 PROCEDURE — 97530 THERAPEUTIC ACTIVITIES: CPT | Mod: GP

## 2022-06-11 RX ORDER — FAMOTIDINE 10 MG
10 TABLET ORAL 2 TIMES DAILY
Status: DISCONTINUED | OUTPATIENT
Start: 2022-06-11 | End: 2022-06-17 | Stop reason: HOSPADM

## 2022-06-11 RX ORDER — LABETALOL HYDROCHLORIDE 5 MG/ML
20 INJECTION, SOLUTION INTRAVENOUS EVERY 4 HOURS PRN
Status: DISCONTINUED | OUTPATIENT
Start: 2022-06-11 | End: 2022-06-14

## 2022-06-11 RX ORDER — PROCHLORPERAZINE 25 MG
25 SUPPOSITORY, RECTAL RECTAL EVERY 12 HOURS PRN
Status: DISCONTINUED | OUTPATIENT
Start: 2022-06-11 | End: 2022-06-14

## 2022-06-11 RX ORDER — GABAPENTIN 300 MG/1
300 CAPSULE ORAL 3 TIMES DAILY
Status: DISCONTINUED | OUTPATIENT
Start: 2022-06-11 | End: 2022-06-17 | Stop reason: HOSPADM

## 2022-06-11 RX ORDER — DEXTROSE MONOHYDRATE, SODIUM CHLORIDE, AND POTASSIUM CHLORIDE 50; 1.49; 4.5 G/1000ML; G/1000ML; G/1000ML
INJECTION, SOLUTION INTRAVENOUS CONTINUOUS
Status: DISCONTINUED | OUTPATIENT
Start: 2022-06-11 | End: 2022-06-13

## 2022-06-11 RX ORDER — POLYETHYLENE GLYCOL 3350 17 G/17G
17 POWDER, FOR SOLUTION ORAL 3 TIMES DAILY
Status: DISCONTINUED | OUTPATIENT
Start: 2022-06-11 | End: 2022-06-14

## 2022-06-11 RX ORDER — HYDROMORPHONE HYDROCHLORIDE 1 MG/ML
0.3 INJECTION, SOLUTION INTRAMUSCULAR; INTRAVENOUS; SUBCUTANEOUS
Status: DISCONTINUED | OUTPATIENT
Start: 2022-06-11 | End: 2022-06-13

## 2022-06-11 RX ORDER — PROCHLORPERAZINE MALEATE 5 MG
5 TABLET ORAL EVERY 6 HOURS PRN
Status: DISCONTINUED | OUTPATIENT
Start: 2022-06-11 | End: 2022-06-14

## 2022-06-11 RX ORDER — ENOXAPARIN SODIUM 100 MG/ML
30 INJECTION SUBCUTANEOUS EVERY 12 HOURS
Status: DISCONTINUED | OUTPATIENT
Start: 2022-06-11 | End: 2022-06-14

## 2022-06-11 RX ORDER — HYDRALAZINE HYDROCHLORIDE 20 MG/ML
10 INJECTION INTRAMUSCULAR; INTRAVENOUS EVERY 6 HOURS PRN
Status: DISCONTINUED | OUTPATIENT
Start: 2022-06-11 | End: 2022-06-14

## 2022-06-11 RX ORDER — HYDROMORPHONE HYDROCHLORIDE 2 MG/1
2-4 TABLET ORAL
Status: DISCONTINUED | OUTPATIENT
Start: 2022-06-11 | End: 2022-06-11

## 2022-06-11 RX ORDER — TRAMADOL HYDROCHLORIDE 50 MG/1
50 TABLET ORAL EVERY 6 HOURS PRN
Status: DISCONTINUED | OUTPATIENT
Start: 2022-06-11 | End: 2022-06-13

## 2022-06-11 RX ORDER — TRAMADOL HYDROCHLORIDE 50 MG/1
50 TABLET ORAL EVERY 6 HOURS PRN
Status: DISCONTINUED | OUTPATIENT
Start: 2022-06-11 | End: 2022-06-11

## 2022-06-11 RX ORDER — BISACODYL 10 MG
10 SUPPOSITORY, RECTAL RECTAL DAILY PRN
Status: DISCONTINUED | OUTPATIENT
Start: 2022-06-11 | End: 2022-06-14

## 2022-06-11 RX ORDER — FLUTICASONE PROPIONATE 50 MCG
2 SPRAY, SUSPENSION (ML) NASAL DAILY
Status: DISCONTINUED | OUTPATIENT
Start: 2022-06-12 | End: 2022-06-17 | Stop reason: HOSPADM

## 2022-06-11 RX ADMIN — POLYETHYLENE GLYCOL 3350 17 G: 17 POWDER, FOR SOLUTION ORAL at 19:57

## 2022-06-11 RX ADMIN — LISINOPRIL 20 MG: 20 TABLET ORAL at 09:11

## 2022-06-11 RX ADMIN — FLUTICASONE FUROATE 1 PUFF: 100 POWDER RESPIRATORY (INHALATION) at 07:57

## 2022-06-11 RX ADMIN — SODIUM CHLORIDE 1000 ML: 900 INJECTION, SOLUTION INTRAVENOUS at 00:37

## 2022-06-11 RX ADMIN — SENNOSIDES AND DOCUSATE SODIUM 2 TABLET: 8.6; 5 TABLET ORAL at 09:10

## 2022-06-11 RX ADMIN — OXYCODONE HYDROCHLORIDE 5 MG: 5 TABLET ORAL at 06:37

## 2022-06-11 RX ADMIN — POTASSIUM CHLORIDE, DEXTROSE MONOHYDRATE AND SODIUM CHLORIDE: 150; 5; 450 INJECTION, SOLUTION INTRAVENOUS at 18:15

## 2022-06-11 RX ADMIN — HYDROMORPHONE HYDROCHLORIDE 2 MG: 2 TABLET ORAL at 15:32

## 2022-06-11 RX ADMIN — GABAPENTIN 300 MG: 300 CAPSULE ORAL at 09:14

## 2022-06-11 RX ADMIN — ACETAMINOPHEN 975 MG: 325 TABLET, FILM COATED ORAL at 13:57

## 2022-06-11 RX ADMIN — GABAPENTIN 300 MG: 300 CAPSULE ORAL at 19:57

## 2022-06-11 RX ADMIN — METHOCARBAMOL 750 MG: 750 TABLET ORAL at 19:57

## 2022-06-11 RX ADMIN — GABAPENTIN 300 MG: 300 CAPSULE ORAL at 13:57

## 2022-06-11 RX ADMIN — PANTOPRAZOLE SODIUM 40 MG: 40 INJECTION, POWDER, FOR SOLUTION INTRAVENOUS at 20:11

## 2022-06-11 RX ADMIN — FAMOTIDINE 10 MG: 10 TABLET, FILM COATED ORAL at 12:52

## 2022-06-11 RX ADMIN — POLYETHYLENE GLYCOL 3350 17 G: 17 POWDER, FOR SOLUTION ORAL at 09:12

## 2022-06-11 RX ADMIN — ONDANSETRON 4 MG: 2 INJECTION INTRAMUSCULAR; INTRAVENOUS at 04:30

## 2022-06-11 RX ADMIN — SENNOSIDES AND DOCUSATE SODIUM 2 TABLET: 8.6; 5 TABLET ORAL at 19:58

## 2022-06-11 RX ADMIN — ACETAMINOPHEN 975 MG: 325 TABLET, FILM COATED ORAL at 19:57

## 2022-06-11 RX ADMIN — METHOCARBAMOL 750 MG: 750 TABLET ORAL at 09:11

## 2022-06-11 RX ADMIN — METHOCARBAMOL 750 MG: 750 TABLET ORAL at 12:52

## 2022-06-11 RX ADMIN — ACETAMINOPHEN 975 MG: 325 TABLET, FILM COATED ORAL at 09:11

## 2022-06-11 RX ADMIN — OXYCODONE HYDROCHLORIDE 10 MG: 5 TABLET ORAL at 00:35

## 2022-06-11 RX ADMIN — OXYCODONE HYDROCHLORIDE 10 MG: 5 TABLET ORAL at 03:30

## 2022-06-11 RX ADMIN — SODIUM CHLORIDE, POTASSIUM CHLORIDE, SODIUM LACTATE AND CALCIUM CHLORIDE 500 ML: 600; 310; 30; 20 INJECTION, SOLUTION INTRAVENOUS at 22:52

## 2022-06-11 RX ADMIN — FAMOTIDINE 10 MG: 10 TABLET, FILM COATED ORAL at 19:57

## 2022-06-11 ASSESSMENT — ACTIVITIES OF DAILY LIVING (ADL)
ADLS_ACUITY_SCORE: 32

## 2022-06-11 NOTE — PROGRESS NOTES
[facility], [place]     Neurosurgery Progress Note:  06/11/2022      Interval History: MRI received T+ L.     Assessment:  62-year-old male with T12 burst fracture.  Neurologically intact on exam.  Burst component appears to involve greater than 50% of spinal canal.  Will need MRI to better visualize degree of compression however will likely require decompression and fusion in this admission given the unstable nature.    Clinically Significant Risk Factors Present on Admission                Recommendations:  TLSO when HOB > 30 or when OOB  Upright XR  Surgery for decompression + fusion likely early next week.  -----------------------------------  Nyasia Mccoy MD  Neurosurgery resident, PGY-2  -----------------------------------      General: Nonacute distress, appearing stated age, slightly anxious regarding this fracture and transfer  HEENT: Atraumatic, normocephalic  PULM: Breathing comfortably on room air  MSK: Midline and paraspinal thoracic lower thoracic/upper lumbar tenderness  NEUROLOGIC:  -- Awake; Alert; oriented x 3  -- Follows commands briskly  -- +repetition, calculation, and naming  -- Speech fluent, spontaneous. No aphasia or dysarthria.  -- no gaze preference. No apparent hemineglect.  Cranial Nerves:  -- visual fields full to confrontation, PERRL 3-2mm bilat and brisk, extraocular movements intact  -- face symmetrical, tongue midline  -- sensory V1-V3 intact bilaterally  -- palate elevates symmetrically, uvula midline  -- hearing grossly intact bilat  -- Trapezii 5/5 strength bilat symmetric  -- Cerebellar: Finger nose finger without dysmetria, intact rapid alternating motions bilaterally     Motor:  Normal bulk / tone; no tremor, rigidity, or bradykinesia.  No muscle wasting or fasciculations  No Pronator Drift       Delt Bi Tri Hand Flexion/  Extension Iliopsoas Quadriceps Hamstrings Tibialis Anterior Gastroc     C5 C6 C7 C8/T1 L2 L3 L4-S1 L4 S1   R 5 5 5 5 5 5 5  5 5   L 5 5 5 5 5 5 5 5 5      Sensory:  intact to LT x 4 extremities        Reflexes: no clonus       Bi Tri BR Jarad Pat Ach Bab     C5-6 C7-8 C6 UMN L2-4 S1 UMN   R 2+ 2+ 2+ Norm 2+ 2+ Norm   L 2+ 2+ 2+ Norm 2+ 2+ Norm      Gait: deferred     Objective:   Temp:  [97.7  F (36.5  C)-98.5  F (36.9  C)] 98.4  F (36.9  C)  Pulse:  [] 105  Resp:  [16-20] 18  BP: (133-176)/() 176/111  Cuff Mean (mmHg):  [129] 129  SpO2:  [96 %-98 %] 96 %  I/O last 3 completed shifts:  In: 2458.33 [P.O.:250; I.V.:2208.33]  Out: 1100 [Urine:1100]        LABS:  Recent Labs   Lab 06/11/22  0821 06/11/22  0736 06/10/22  1250 06/10/22  0524 06/10/22  0344 06/09/22 2128   NA  --   --   --  142  --  139   POTASSIUM 4.4  --  4.1 3.4  --  3.5   CHLORIDE  --   --   --  106  --  104   CO2  --   --   --  24  --  25   ANIONGAP  --   --   --  12  --  10   GLC  --  166*  --  145* 131* 156*   BUN  --   --   --  14  --  13   CR  --   --   --  0.57*  --  0.66   MARGE  --   --   --  8.9  --  9.0       Recent Labs   Lab 06/10/22  0524   WBC 7.3   RBC 4.25*   HGB 14.8   HCT 43.1   *   MCH 34.8*   MCHC 34.3   RDW 12.1          IMAGING:  Recent Results (from the past 24 hour(s))   MR Thoracic Spine w/o Contrast    Narrative    Thoracic and Lumbar spine MRI without contrast    History: T12 burst fx w/thecal sac compression.  ICD-10:  Comparison: CT abdomen and pelvis 6/9/2022    Technique:  Axial T2-weighted, and sagittal T1, STIR, and T2-weighted  images of the lumbar spine without intravenous contrast. Sagittal T1,  STIR, and T2-weighted images of the thoracic spine without contrast  were obtained, with axial T2-weighted images through levels of  interest in the thoracic spine.    Findings:  Thoracic Spine:  The external marker is at the level of T7-8.    There is no definite abnormal signal in the thoracic spinal cord at  any level. Alignment of the thoracic vertebra appears within normal  limits. Vertebral body hemangiomas within T6,  T7 and T9.  Redemonstration of compression deformity of T12 with approximately 20%  height loss and bone marrow edema. Retropulsion of fracture fragment  posteriorly causing moderate thecal sac compression and moderate  spinal canal narrowing posterior to the T12 vertebral body. No  myelopathic cord within the thoracic cord.    Lumbar Spine:  Counting down from C2, there are 5 lumbar-type vertebra.  The tip of  the conus is at T12. The alignment of the lumbar spine is  unremarkable. Vertebral body hemangioma within L3..  On a level by level basis, the findings are as follows:    L1-2: Posterior disc osteophyte complex and facet arthropathy without  significant spinal canal or neural foraminal narrowing.    L2-3:  Circumferential disc bulge and facet arthropathy causing mild  bilateral neural foraminal narrowing. Mild spinal canal narrowing..    L3-4:  Mild circumferential disc bulging and facet arthropathy causing  mild left and moderate right neural foraminal narrowing. Minimal  spinal canal narrowing..    L4-5:  Circumferential disc bulge and facet arthropathy causing  moderate right and mild-to-moderate left neural foraminal narrowing  and mild spinal canal narrowing..    L5-S1:  Posterior broad-based disc bulge with mild bilateral neural  foraminal narrowing and minimal spinal canal narrowing..    The visualized paraspinous tissues anteriorly are unremarkable.      Impression    Impression:   1. Redemonstration of T12 vertebral body compression fracture with  retropulsion of fracture fragments and approximately 20% height loss.  Moderate spinal canal narrowing with some mass effect on the thecal  sac, but no myelopathic cord signal is visualized..   2. Degenerative changes of the lumbar spine as detailed above..     I have personally reviewed the examination and initial interpretation  and I agree with the findings.    LIBERTY ARCEO MD         SYSTEM ID:  [id number]   MR Lumbar Spine w/o Contrast    Narrative     Thoracic and Lumbar spine MRI without contrast    History: T12 burst fx w/thecal sac compression.  ICD-10:  Comparison: CT abdomen and pelvis 6/9/2022    Technique:  Axial T2-weighted, and sagittal T1, STIR, and T2-weighted  images of the lumbar spine without intravenous contrast. Sagittal T1,  STIR, and T2-weighted images of the thoracic spine without contrast  were obtained, with axial T2-weighted images through levels of  interest in the thoracic spine.    Findings:  Thoracic Spine:  The external marker is at the level of T7-8.    There is no definite abnormal signal in the thoracic spinal cord at  any level. Alignment of the thoracic vertebra appears within normal  limits. Vertebral body hemangiomas within T6, T7 and T9.  Redemonstration of compression deformity of T12 with approximately 20%  height loss and bone marrow edema. Retropulsion of fracture fragment  posteriorly causing moderate thecal sac compression and moderate  spinal canal narrowing posterior to the T12 vertebral body. No  myelopathic cord within the thoracic cord.    Lumbar Spine:  Counting down from C2, there are 5 lumbar-type vertebra.  The tip of  the conus is at T12. The alignment of the lumbar spine is  unremarkable. Vertebral body hemangioma within L3..  On a level by level basis, the findings are as follows:    L1-2: Posterior disc osteophyte complex and facet arthropathy without  significant spinal canal or neural foraminal narrowing.    L2-3:  Circumferential disc bulge and facet arthropathy causing mild  bilateral neural foraminal narrowing. Mild spinal canal narrowing..    L3-4:  Mild circumferential disc bulging and facet arthropathy causing  mild left and moderate right neural foraminal narrowing. Minimal  spinal canal narrowing..    L4-5:  Circumferential disc bulge and facet arthropathy causing  moderate right and mild-to-moderate left neural foraminal narrowing  and mild spinal canal narrowing..    L5-S1:  Posterior  broad-based disc bulge with mild bilateral neural  foraminal narrowing and minimal spinal canal narrowing..    The visualized paraspinous tissues anteriorly are unremarkable.      Impression    Impression:   1. Redemonstration of T12 vertebral body compression fracture with  retropulsion of fracture fragments and approximately 20% height loss.  Moderate spinal canal narrowing with some mass effect on the thecal  sac, but no myelopathic cord signal is visualized..   2. Degenerative changes of the lumbar spine as detailed above..     I have personally reviewed the examination and initial interpretation  and I agree with the findings.    LIBERTY ARCEO MD         SYSTEM ID:  [id number]     I have reviewed the history above and agree with the resident's assessment and plan.  IAM Buckner MD

## 2022-06-11 NOTE — PLAN OF CARE
Neuro: A&Ox4.   Cardiac: NSR. VSS.   Respiratory: Sating upper 90's on RA.  GI/: Adequate urine output. Frequency. No BM  Diet/appetite: Tolerating regular diet. Poor appetite.  Activity:  Bedrest. Spinal precations.  Pain: At acceptable level on current regimen. Complaints of back pain. PRN pain medication given per MAR.  Skin: No new deficits noted. Bruising on buttox  LDA's: PIV    Plan: Continue with POC. Notify primary team with changes.

## 2022-06-11 NOTE — PROGRESS NOTES
06/11/22 1300   Quick Adds   Type of Visit Initial PT Evaluation       Present no   Living Environment   People in Home spouse   Current Living Arrangements house   Home Accessibility stairs to enter home   Number of Stairs, Main Entrance 2   Stair Railings, Main Entrance railing on right side (ascending)   Transportation Anticipated family or friend will provide;car, drives self   Living Environment Comments PT: Pt lives with spouse in [place], [place] [facility] w/ 2 [address]AV[address] only.   Self-Care   Usual Activity Tolerance excellent   Current Activity Tolerance fair   Regular Exercise Yes   Activity/Exercise Type other (see comments);walking  (yard work)   Exercise Amount/Frequency 3-5 times/wk   Equipment Currently Used at Home none  (has FWW)   Fall history within last six months yes   Number of times patient has fallen within last six months 1   Activity/Exercise/Self-Care Comment PT: Pt reports is very active at baseline, was self installing patio on day of fall. Has FWW from prior knee surgeries.   General Information   Onset of Illness/Injury or Date of Surgery 06/09/22   Referring Physician Mariann Snowden PA-C   Patient/Family Therapy Goals Statement (PT) to get up and walk   Pertinent History of Current Problem (include personal factors and/or comorbidities that impact the POC) T12 burst fracture, involving > 50% of spinal canal   Existing Precautions/Restrictions fall;brace worn when out of bed;spinal   Weight-Bearing Status - LUE partial weight-bearing (% in comments)  (<10#)   Weight-Bearing Status - RUE partial weight-bearing (% in comments)  (<10#)   Weight-Bearing Status - LLE weight-bearing as tolerated   Weight-Bearing Status - RLE weight-bearing as tolerated   Heart Disease Risk Factors Medical history;Lack of physical activity;High blood pressure   General Observations PT: TLSO on when HOB >30 degrees   Cognition   Affect/Mental Status (Cognition) WNL   Orientation Status  (Cognition) oriented x 4   Follows Commands (Cognition) WNL   Pain Assessment   Patient Currently in Pain Yes, see Vital Sign flowsheet  (fracture site w/ activity)   Integumentary/Edema   Integumentary/Edema no deficits were identifed   Posture    Posture Forward head position   Range of Motion (ROM)   Range of Motion ROM is WNL   ROM Comment within precautions   Strength (Manual Muscle Testing)   Strength Comments appears 5/5 BLE strength; not formally testing given spinal precautions   Bed Mobility   Comment, (Bed Mobility) modA of 1 supine>sit, Elizabeth of 2 sit>supine   Transfers   Comment, (Transfers) CGA to FWW   Gait/Stairs (Locomotion)   Comment, (Gait/Stairs) CGA w/ FWW   Balance   Balance Comments heavy BUE support for static sitting for balance and pain management; SBA. CGA for static and dynamic standing balance w/ FWW   Sensory Examination   Sensory Perception patient reports no sensory changes   Coordination   Coordination no deficits were identified   Muscle Tone   Muscle Tone no deficits were identified   Clinical Impression   Criteria for Skilled Therapeutic Intervention Yes, treatment indicated   PT Diagnosis (PT) impaired functional mobility   Influenced by the following impairments pain, precautions and medical status, decreased activity tolerance and functional balance   Functional limitations due to impairments decreased (I) w/ bed mobility, transfers, gait   Clinical Presentation (PT Evaluation Complexity) Evolving/Changing   Clinical Presentation Rationale current status, clinical judgement, PMH, home set up   Clinical Decision Making (Complexity) moderate complexity   Planned Therapy Interventions (PT) bed mobility training;gait training;home exercise program;neuromuscular re-education;patient/family education;stair training;strengthening;transfer training;progressive activity/exercise;risk factor education;home program guidelines   Anticipated Equipment Needs at Discharge (PT)   (has FWW, TBD)    Risk & Benefits of therapy have been explained evaluation/treatment results reviewed;care plan/treatment goals reviewed;risks/benefits reviewed;current/potential barriers reviewed;participants voiced agreement with care plan;participants included;patient   Clinical Impression Comments PT: Pt presents with decreased functional activity tolerance and balance in setting of new spinal fx. Mobility impacted by pain and new precautions/ brace use. Will benefit from skilled PT to progress safe functional mobility while inpatient.   PT Discharge Planning   PT Discharge Recommendation (DC Rec) Transitional Care Facility;home with assist   PT Rationale for DC Rec Pt mobilizing well but limited by pain and precautions. Will update recs as appropriate pending progress after planned surgery in upcoming days.   PT Brief overview of current status A of 2 bed mobility, A of 1 transfers and gait w/ FWW   Plan of Care Review   Plan of Care Reviewed With patient   Total Evaluation Time   Total Evaluation Time (Minutes) 10   Physical Therapy Goals   PT Frequency 5x/week   PT Predicted Duration/Target Date for Goal Attainment 06/25/22   PT Goals Bed Mobility;Transfers;Stairs;Gait   PT: Bed Mobility Independent;Bridging;Supine to/from sit;Within precautions;Rolling   PT: Transfers Modified independent;Sit to/from stand;Bed to/from chair;Assistive device   PT: Gait Modified independent;Greater than 200 feet;Assistive device   PT: Stairs Supervision/stand-by assist;2 stairs;Rail on right

## 2022-06-11 NOTE — PLAN OF CARE
Neuro: A&Ox4. neuros intact and unchanged   Cardiac:  VSS. BP elevated, PRN meds ordered however pt did not need, last /90   Respiratory: Sating > 95% on RA.  GI/: Adequate urine output. + gas, no BM. Projectile emesis this evening > NG inserted   Diet/appetite: NPO  Activity:  Assist of 1-2, up to chair and in halls. Spinal precautions, TLSO brace when OOB or HOB > 30  Pain: At acceptable level on current regimen.   Skin: No new deficits noted.  LDA's: PIV, NG - LIS    Plan for surgery later this week, add on case    Plan: Continue with POC. Notify primary team with changes.

## 2022-06-11 NOTE — PROGRESS NOTES
[facility]  Trauma Service Progress Note    Date of Service: 06/11/2022    Trauma Mechanism: Mechanical Fall backwards   Date of Injury: 6/7/22 (2 days prior to admission)  Known Injuries:  1. T12 burst fracture, involving > 50% of spinal canal.       Assessment & Plan    Addendum:   - Nurse paged me at 1710, patient had ~ 1L dark brown emesis. Had nurse place an NG tube,    Patient has not had a BM during admission and had emesis this morning. He has been taking oxycodone and dilaudid for pain from T12 fracture. Per patient he has been passing gas but was nauseated by the meds after he vomited this morning.   - Discussed plan with Dr. Pacheco, Abd xray taken for baseline and to confirm placement, protonix bid started, Occult blood of gastric fluids ordered, hemoglobin added for baseline, 18g IV in AC recommended if needed. Bowel regemin added to: Miralax now TID, continue Senna-Docusate, Dulcolax supp daily prn, pink lady enema daily prn, nurse to place rectal tube if needed since patient has difficulty with mobility in TLSO.     Neuro/Pain/Psych:  # Fall backwards, hit back  # Acute traumatic pain   - Scheduled: Tylenol, Robaxin. Lidoderm, Gabapentin 300mg for nerve pain  - PRN: dilaudid po  - Discontinued oxycodone d/t vomiting this morning (6/11)  - Maintain circadian rhythm. Lights on during the day. Off at night, minimize cares at night. OOB during the day.     Pulmonary:  # Pulmonary nodules   - Supplemental oxygen to keep saturation above 92 %.  - Incentive spirometer while awake   - CT: Several indeterminate pulmonary nodules at both lung bases measure 0.4 cm or smaller. Please refer to pulmonary nodule follow-up guidelines below. Placed follow-up recs on discharge paperwork.      Cardiovascular:    # Hypertension   - Monitor hemodynamic status.   - continue PTA: Lisinopril   - Prn: hydralazine, labetalol      GI/Nutrition:    # Diffuse fatty infiltration of  the liver, Mild splenomegaly on CT  # GERD  - Added prn TUMs and Pepcid per pt request   - Regular Diet      Renal/ Fluids/Electrolytes:  - CT findings of Nonobstructing 0.3 cm right renal stone.   - Stopped IVF today  - electrolyte replacement protocol in place.      Endocrine:  # Stress hyperglycemia   - No management indication. Sliding scale for glucose management. Goal to keep BG < 180 for optimal wound healing      Infectious disease:   -  No indications for antibiotics.      Hematology:    - Hgb 14.8. Monitor and trend.   - Threshold for transfusion if hgb <7.0 or signs/symptoms of hypoperfusion.        Musculoskeletal:  # T12 vertebral body   Abdominal CT: Mild age-indeterminate compression of the T12 vertebral body is likely acute, and there is associated retropulsion of bony fragments and significant narrowing of the spinal canal, likely causing some degree of spinal cord compression.  - Neurosurgery following: per discussion today they will plan on surgery sometime next week.   - TLSO will arrive today, then will get upright XR  - Physical and occupational therapy consults.     Skin:  - dilgent cares to prevent skin breakdown and wound formation.       Lines/ tubes/ drains:  - PIV         General Cares:    PPI/H2 blocker:  NA   DVT prophylaxis: pcd   Bowel Regimen/Date of last stool: in place   Pulmonary toilet: IS    Code status:  Full Code      Discharge goals:     Adequate pain management: in process    VSS x24 hours: yes    Hemoglobin stable x 48 hours: NA    Ambulating safely and/or therapy evals complete: in process    Drains/lines removed or plan in place to manage: yes    Teaching done: in process    Other:  Expected D/C date: Dependent on surgical plan    Mariann Snowden PA-C  To contact the trauma service use job code pager [phone],   Numeric texts or alpha text through [facility]     Interval History   Patient laying on back. He will get his TLSO today around 11 then will have upright xrays. Per  discussion with Neurosurgery will have surgery sometime this week.    ROS x 8 negative with exception of those things listed in interval hx    Physical Exam   Temp: 97.7  F (36.5  C) Temp src: Oral BP: (!) 166/102 Pulse: 102   Resp: 20 SpO2: 96 % O2 Device: None (Room air)    Vitals:    06/10/22 0315 06/11/22 0543   Weight: 131.5 kg (289 lb 14.5 oz) 130.4 kg (287 lb 8 oz)     Vital Signs with Ranges  Temp:  [97.7  F (36.5  C)-98.6  F (37  C)] 97.7  F (36.5  C)  Pulse:  [] 102  Resp:  [16-20] 20  BP: (133-167)/() 166/102  Cuff Mean (mmHg):  [129] 129  SpO2:  [96 %-98 %] 96 %  I/O last 3 completed shifts:  In: 2458.33 [P.O.:250; I.V.:2208.33]  Out: 1100 [Urine:1100]    [place] Coma Scale - Total 15/15  Eye Response (E): 4   4= spontaneous, 3= to verbal/voice, 2= to pain, 1= No response   Verbal Response (V): 5   5= Orientated, converses, 4= Confused, converses, 3= Inappropriate words, 2= Incomprehensible sounds, 1=No response   Motor Response (M): 6   6= Obeys commands, 5= Localizes to pain, 4= Withdrawal to pain, 3=Fexion to pain, 2= Extension to pain, 1= No response     Constitutional: Awake, alert, cooperative, no apparent distress.  Eyes: Lids and lashes normal, PERRL, EOMI. sclera clear, conjunctiva normal.  HENT: Normocephalic, atraumatic  Respiratory: No increased work of breathing, good air exchange, clear to auscultation bilaterally,   Cardiovascular:  regular rate and rhythm, normal S1 and S2,  GI: Abdomen soft, non-distended, non-tender,  Genitourinary:  Voids independently   Skin:  Warm & dry  Musculoskeletal: There is no redness, warmth, or swelling of the joints.    Neurologic: Awake, alert, oriented. Strength and sensory is intact. No focal deficits.  Neuropsychiatric: Calm, normal eye contact, alert, affect appropriate to situation, oriented, thought process normal.

## 2022-06-12 ENCOUNTER — APPOINTMENT (OUTPATIENT)
Dept: GENERAL RADIOLOGY | Facility: CLINIC | Age: 62
End: 2022-06-12
Attending: NURSE PRACTITIONER
Payer: COMMERCIAL

## 2022-06-12 LAB
ANION GAP SERPL CALCULATED.3IONS-SCNC: 6 MMOL/L (ref 3–14)
BUN SERPL-MCNC: 19 MG/DL (ref 7–30)
CALCIUM SERPL-MCNC: 9.4 MG/DL (ref 8.5–10.1)
CHLORIDE BLD-SCNC: 107 MMOL/L (ref 94–109)
CO2 SERPL-SCNC: 25 MMOL/L (ref 20–32)
CREAT SERPL-MCNC: 0.76 MG/DL (ref 0.66–1.25)
ERYTHROCYTE [DISTWIDTH] IN BLOOD BY AUTOMATED COUNT: 12 % (ref 10–15)
GFR SERPL CREATININE-BSD FRML MDRD: >90 ML/MIN/1.73M2
GLUCOSE BLD-MCNC: 214 MG/DL (ref 70–99)
HCT VFR BLD AUTO: 46.4 % (ref 40–53)
HGB BLD-MCNC: 15.5 G/DL (ref 13.3–17.7)
MCH RBC QN AUTO: 34.4 PG (ref 26.5–33)
MCHC RBC AUTO-ENTMCNC: 33.4 G/DL (ref 31.5–36.5)
MCV RBC AUTO: 103 FL (ref 78–100)
PLATELET # BLD AUTO: 217 10E3/UL (ref 150–450)
POTASSIUM BLD-SCNC: 4.3 MMOL/L (ref 3.4–5.3)
RBC # BLD AUTO: 4.5 10E6/UL (ref 4.4–5.9)
SODIUM SERPL-SCNC: 138 MMOL/L (ref 133–144)
WBC # BLD AUTO: 9.4 10E3/UL (ref 4–11)

## 2022-06-12 PROCEDURE — 250N000011 HC RX IP 250 OP 636: Performed by: PHYSICIAN ASSISTANT

## 2022-06-12 PROCEDURE — 120N000003 HC R&B IMCU UMMC

## 2022-06-12 PROCEDURE — 85014 HEMATOCRIT: CPT | Performed by: PHYSICIAN ASSISTANT

## 2022-06-12 PROCEDURE — 258N000003 HC RX IP 258 OP 636: Performed by: PHYSICIAN ASSISTANT

## 2022-06-12 PROCEDURE — 250N000013 HC RX MED GY IP 250 OP 250 PS 637: Performed by: STUDENT IN AN ORGANIZED HEALTH CARE EDUCATION/TRAINING PROGRAM

## 2022-06-12 PROCEDURE — 250N000013 HC RX MED GY IP 250 OP 250 PS 637: Performed by: PHYSICIAN ASSISTANT

## 2022-06-12 PROCEDURE — 36415 COLL VENOUS BLD VENIPUNCTURE: CPT | Performed by: PHYSICIAN ASSISTANT

## 2022-06-12 PROCEDURE — C9113 INJ PANTOPRAZOLE SODIUM, VIA: HCPCS | Performed by: PHYSICIAN ASSISTANT

## 2022-06-12 PROCEDURE — 999N000111 HC STATISTIC OT IP EVAL DEFER: Performed by: OCCUPATIONAL THERAPIST

## 2022-06-12 PROCEDURE — 999N000248 HC STATISTIC IV INSERT WITH US BY RN

## 2022-06-12 PROCEDURE — 74018 RADEX ABDOMEN 1 VIEW: CPT | Mod: 26 | Performed by: RADIOLOGY

## 2022-06-12 PROCEDURE — 99232 SBSQ HOSP IP/OBS MODERATE 35: CPT | Performed by: NURSE PRACTITIONER

## 2022-06-12 PROCEDURE — 80048 BASIC METABOLIC PNL TOTAL CA: CPT | Performed by: PHYSICIAN ASSISTANT

## 2022-06-12 PROCEDURE — 74018 RADEX ABDOMEN 1 VIEW: CPT

## 2022-06-12 RX ADMIN — PANTOPRAZOLE SODIUM 40 MG: 40 INJECTION, POWDER, FOR SOLUTION INTRAVENOUS at 08:08

## 2022-06-12 RX ADMIN — FLUTICASONE PROPIONATE 2 SPRAY: 50 SPRAY, METERED NASAL at 08:07

## 2022-06-12 RX ADMIN — METHOCARBAMOL 750 MG: 750 TABLET ORAL at 08:09

## 2022-06-12 RX ADMIN — HYDROMORPHONE HYDROCHLORIDE 0.3 MG: 1 INJECTION, SOLUTION INTRAMUSCULAR; INTRAVENOUS; SUBCUTANEOUS at 01:32

## 2022-06-12 RX ADMIN — METHOCARBAMOL 750 MG: 750 TABLET ORAL at 15:40

## 2022-06-12 RX ADMIN — FAMOTIDINE 10 MG: 10 TABLET, FILM COATED ORAL at 19:54

## 2022-06-12 RX ADMIN — PANTOPRAZOLE SODIUM 40 MG: 40 INJECTION, POWDER, FOR SOLUTION INTRAVENOUS at 19:54

## 2022-06-12 RX ADMIN — HYDROMORPHONE HYDROCHLORIDE 0.3 MG: 1 INJECTION, SOLUTION INTRAMUSCULAR; INTRAVENOUS; SUBCUTANEOUS at 15:37

## 2022-06-12 RX ADMIN — MAGNESIUM CITRATE 286 ML: 1.75 LIQUID ORAL at 10:40

## 2022-06-12 RX ADMIN — POTASSIUM CHLORIDE, DEXTROSE MONOHYDRATE AND SODIUM CHLORIDE: 150; 5; 450 INJECTION, SOLUTION INTRAVENOUS at 08:05

## 2022-06-12 RX ADMIN — ACETAMINOPHEN 975 MG: 325 TABLET, FILM COATED ORAL at 15:40

## 2022-06-12 RX ADMIN — FAMOTIDINE 10 MG: 10 TABLET, FILM COATED ORAL at 08:09

## 2022-06-12 RX ADMIN — ACETAMINOPHEN 975 MG: 325 TABLET, FILM COATED ORAL at 08:09

## 2022-06-12 RX ADMIN — LISINOPRIL 20 MG: 20 TABLET ORAL at 08:09

## 2022-06-12 RX ADMIN — GABAPENTIN 300 MG: 300 CAPSULE ORAL at 19:54

## 2022-06-12 RX ADMIN — FLUTICASONE FUROATE 1 PUFF: 100 POWDER RESPIRATORY (INHALATION) at 08:08

## 2022-06-12 RX ADMIN — POLYETHYLENE GLYCOL 3350 17 G: 17 POWDER, FOR SOLUTION ORAL at 08:08

## 2022-06-12 RX ADMIN — TRAMADOL HYDROCHLORIDE 50 MG: 50 TABLET, COATED ORAL at 02:09

## 2022-06-12 RX ADMIN — ACETAMINOPHEN 975 MG: 325 TABLET, FILM COATED ORAL at 19:54

## 2022-06-12 RX ADMIN — METHOCARBAMOL 750 MG: 750 TABLET ORAL at 19:54

## 2022-06-12 RX ADMIN — GABAPENTIN 300 MG: 300 CAPSULE ORAL at 08:09

## 2022-06-12 RX ADMIN — SENNOSIDES AND DOCUSATE SODIUM 2 TABLET: 8.6; 5 TABLET ORAL at 08:09

## 2022-06-12 ASSESSMENT — ACTIVITIES OF DAILY LIVING (ADL)
ADLS_ACUITY_SCORE: 32
ADLS_ACUITY_SCORE: 34
ADLS_ACUITY_SCORE: 32
ADLS_ACUITY_SCORE: 34
ADLS_ACUITY_SCORE: 32
ADLS_ACUITY_SCORE: 32
ADLS_ACUITY_SCORE: 34
ADLS_ACUITY_SCORE: 32

## 2022-06-12 NOTE — PROGRESS NOTES
Time of notification: 10:30 PM  Provider notified: On call Trauma  Patient status:Patient heart rate is upper 120's. BP is 133/93.  Temp:  [97.7  F (36.5  C)-98.5  F (36.9  C)] 98.5  F (36.9  C)  Pulse:  [] 115  Resp:  [16-20] 16  BP: (130-176)/() 143/97  SpO2:  [93 %-98 %] 98 %  Orders received: Will put in orders for 500ml LR bolus.

## 2022-06-12 NOTE — PLAN OF CARE
Neuro: A&Ox4. Anxious  Cardiac: ST. VSS. Provider paged for tachycardia in upper 120's. Bolus ordered.  Respiratory: Sating upper 90's on RA.  GI/: Adequate urine output. Voids in urinal. No BM, passing gas  Diet/appetite: NPO diet. NG tube on LIWS.  Activity:  Assist of x2, Walked to door and back. Up to commode. Uses walker and TLSO brace.  Pain: At acceptable level on current regimen. Complaints of lower back pain  Skin: No new deficits noted. Bruising on bottom  LDA's: PIV infiltrated with maintenance fluids containing K+. Pictures taken. NG tube    Plan: Continue with POC. Notify primary team with changes.

## 2022-06-12 NOTE — PLAN OF CARE
Goal Outcome Evaluation:  Blood pressure (!) 150/97, pulse 108, temperature 98.7  F (37.1  C), temperature source Oral, resp. rate 18, weight 133.4 kg (294 lb 3.2 oz), SpO2 98 %.  Neuro: A&Ox4.   Cardiac: Sinus tachycardic. VSS.   Respiratory: Sating >92% on R.A.  GI/: Adequate urine output. Multiple stools after pink lady enema.    Diet/appetite: Remains NPO, NG tube to LIWS-minimal output.   Activity:  Bedrest   Pain: At acceptable level on current regimen.   Skin: No new deficits noted.  LDA's: PIV x1    Plan: Continue with POC. Notify primary team with changes.

## 2022-06-12 NOTE — PROGRESS NOTES
[facility], [place]     Neurosurgery Progress Note:  06/12/2022      Interval History: MRI received T+ L.     Assessment:  62-year-old male with T12 burst fracture.  Neurologically intact on exam.  Burst component appears to involve greater than 50% of spinal canal.  Will need MRI to better visualize degree of compression however will likely require decompression and fusion in this admission given the unstable nature.  He was fit with TLSO and underwent upright XR, demonstrating stable spinal alignment while upright.    Clinically Significant Risk Factors Present on Admission                Recommendations:  TLSO when HOB > 30 or when OOB  Upright XR--complete, stable  Surgery for decompression + fusion likely early next week.  -----------------------------------  Corona Euceda M.D.  Neurosurgery Resident, PGY-3    Please contact neurosurgery resident on call with questions.    Dial * * [phone], enter [phone] when prompted.    -----------------------------------      General: Nonacute distress, appearing stated age, slightly anxious regarding this fracture and transfer  HEENT: Atraumatic, normocephalic  PULM: Breathing comfortably on room air  MSK: Midline and paraspinal thoracic lower thoracic/upper lumbar tenderness  NEUROLOGIC:  -- Awake; Alert; oriented x 3  -- Follows commands briskly  -- +repetition, calculation, and naming  -- Speech fluent, spontaneous. No aphasia or dysarthria.  -- no gaze preference. No apparent hemineglect.  Cranial Nerves:  -- visual fields full to confrontation, PERRL 3-2mm bilat and brisk, extraocular movements intact  -- face symmetrical, tongue midline  -- sensory V1-V3 intact bilaterally  -- palate elevates symmetrically, uvula midline  -- hearing grossly intact bilat  -- Trapezii 5/5 strength bilat symmetric  -- Cerebellar: Finger nose finger without dysmetria, intact rapid alternating motions bilaterally     Motor:  Normal bulk / tone; no tremor,  rigidity, or bradykinesia.  No muscle wasting or fasciculations  No Pronator Drift       Delt Bi Tri Hand Flexion/  Extension Iliopsoas Quadriceps Hamstrings Tibialis Anterior Gastroc     C5 C6 C7 C8/T1 L2 L3 L4-S1 L4 S1   R 5 5 5 5 5 5 5 5 5   L 5 5 5 5 5 5 5 5 5      Sensory:  intact to LT x 4 extremities        Reflexes: no clonus       Bi Tri BR Jarad Pat Ach Bab     C5-6 C7-8 C6 UMN L2-4 S1 UMN   R 2+ 2+ 2+ Norm 2+ 2+ Norm   L 2+ 2+ 2+ Norm 2+ 2+ Norm      Gait: deferred     Objective:   Temp:  [98.1  F (36.7  C)-98.5  F (36.9  C)] 98.1  F (36.7  C)  Pulse:  [105-130] 112  Resp:  [16-18] 18  BP: (130-176)/() 150/98  SpO2:  [93 %-98 %] 95 %  I/O last 3 completed shifts:  In: 1875 [P.O.:500; I.V.:1375]  Out: 1000 [Urine:600; Emesis/NG output:400]        LABS:  Recent Labs   Lab 06/12/22  0554 06/11/22  0821 06/11/22  0736 06/10/22  1250 06/10/22  0524 06/10/22  0344 06/09/22  2128     --   --   --  142  --  139   POTASSIUM 4.3 4.4  --  4.1 3.4  --  3.5   CHLORIDE 107  --   --   --  106  --  104   CO2 25  --   --   --  24  --  25   ANIONGAP 6  --   --   --  12  --  10   *  --  166*  --  145*   < > 156*   BUN 19  --   --   --  14  --  13   CR 0.76  --   --   --  0.57*  --  0.66   MARGE 9.4  --   --   --  8.9  --  9.0    < > = values in this interval not displayed.       Recent Labs   Lab 06/12/22  0554   WBC 9.4   RBC 4.50   HGB 15.5   HCT 46.4   *   MCH 34.4*   MCHC 33.4   RDW 12.0          IMAGING:  Recent Results (from the past 24 hour(s))   XR Thoracic Lumbar Standing 2 Views    Narrative    EXAM: XR THORACIC LUMBAR STANDING 2 VW  LOCATION: [facility]  DATE/TIME: 6/11/2022 2:56 PM    INDICATION: Back pain. T12 vertebral body fracture  COMPARISON: Thoracic and lumbar spine MRI dated 06/10/2022  TECHNIQUE: Views of the spine were obtained and reviewed.    FINDINGS:  Straightening of the spinal curvature without significant  spondylolisthesis. Redemonstrated T12 vertebral body fracture with mild retropulsion, which is better characterized on recent MRI dated 06/10/2022. Scattered multilevel degenerative change.   XR Abdomen Port 1 View    Narrative    Exam: XR ABDOMEN PORT 1 VIEWS, 6/11/2022 6:20 PM    Indication: NG tube placement    Comparison: CT abdomen and pelvis 6/5/2022.    Findings:   Portable AP abdominal radiograph. Nasogastric tube tip and sidehole  overlying the stomach with tip pointing superiorly in the fundus.  Multiple gas-filled distended loops of bowel demonstrated, increased  from prior CT. This appears to be both large and small bowel. The  known T12 fracture and right 11th rib fracture are not well visualized  on the radiograph. Minor basilar atelectasis.      Impression    Impression:   1.  Nasogastric tube tip and sidehole overlying the stomach.  2.  Increased gaseous distention of bowel loops may represent  developing ileus. Correlation with any GI symptoms.    KATELYN BARNES MD         SYSTEM ID:  [id number]       I have reviewed the history above and agree with the resident's assessment and plan.  IAM Buckner MD

## 2022-06-12 NOTE — PROGRESS NOTES
[facility]  Trauma Service Progress Note    Date of Service (when I saw the patient): 06/12/2022     Assessment & Plan   Trauma Mechanism: Mechanical Fall backwards   Date of Injury: 6/7/22 (2 days prior to admission)  Known Injuries:  1. T12 burst fracture, involving > 50% of spinal canal.    Neuro/Pain/Psych:  # Acute traumatic pain   - Scheduled: Tylenol, Robaxin. Lidoderm, Gabapentin 300mg for nerve pain  - PRN: dilaudid IV and Ultram  - Maintain circadian rhythm. Lights on during the day. Off at night, minimize cares at night. OOB during the day.     Pulmonary:  # Pulmonary nodules noted on CT  - Supplemental oxygen to keep saturation above 92 %.  - Incentive spirometer while awake    - Pulmonary nodule follow up per guidelines in 6 months     Cardiovascular:    # Hypertension   - Monitor hemodynamic status.   - continue PTA: Lisinopril   - Prn: hydralazine, labetalol      GI/Nutrition:    #Concern for early developing ileus  # GERD  Large emesis 06/11, dark, gastric occult (+) XR's of the abdomen obtained with distended loops of large and small bowel.  NG placed for decompression, bowel regimen increased. Feels less distended, denies nausea,  No BM yet. 250ml NG output overnight  - Continue NG for decompression, attempt removal tomorrow  - Ambulate, up in chair  - Enema today  - PPI BID  - MIVF  - Optimize electrolytes to keep K>4.0, mag >2.0     Renal/ Fluids/Electrolytes:  - CT findings of Nonobstructing 0.3 cm right renal stone.   - electrolyte replacement protocol in place.      Endocrine:  Monitor glucose levels     Infectious disease:   -  No indications for antibiotics.      Hematology:    - Hgb 14.8. Monitor and trend.   - Threshold for transfusion if hgb <7.0 or signs/symptoms of hypoperfusion.        Musculoskeletal:  # T12 vertebral body fracture with retropulsion  - Neurosurgery following: per discussion, planned surgery sometime this week  - TLSO brace  when upright, XR's completed  - Physical and occupational therapy consults.     Skin:  - dilgent cares to prevent skin breakdown and wound formation.       Lines/ tubes/ drains:  - PIV         General Cares:                 PPI/H2 blocker:  NA                DVT prophylaxis:SCD's                Bowel Regimen/Date of last stool: PTA, bowel regimen ordered, NG in place, enema today                Pulmonary toilet: IS     Code status:  Full Code                   Discharge goals:     Adequate pain management: yes    VSS x24 hours: yes    Hemoglobin stable x 48 hours: NA    Ambulating safely and/or therapy evals complete: yes    Drains/lines removed or plan in place to manage: yes    Teaching done: yes    Other:  Expected D/C date: pending surgical plan    Interval History   Feels better after NG tube place, passing flatus, wants to ambulate. TLSO comfortable. Fluid bolus given overnight for tachycardia  ROS x 8 negative with exception of those things listed in interval hx    Physical Exam   Temp: 98.1  F (36.7  C) Temp src: Oral BP: (!) 150/98 Pulse: 112   Resp: 18 SpO2: 95 % O2 Device: None (Room air)    Vitals:    06/10/22 0315 06/11/22 0543 06/12/22 0544   Weight: 131.5 kg (289 lb 14.5 oz) 130.4 kg (287 lb 8 oz) 133.4 kg (294 lb 3.2 oz)     Vital Signs with Ranges  Temp:  [98.1  F (36.7  C)-98.5  F (36.9  C)] 98.1  F (36.7  C)  Pulse:  [105-130] 112  Resp:  [16-18] 18  BP: (130-176)/() 150/98  SpO2:  [93 %-98 %] 95 %  I/O last 3 completed shifts:  In: 1875 [P.O.:500; I.V.:1375]  Out: 1000 [Urine:600; Emesis/NG output:400]    [place] Coma Scale - Total 15/15  Eye Response (E): 4   4= spontaneous, 3= to verbal/voice, 2= to pain, 1= No response   Verbal Response (V): 5   5= Orientated, converses, 4= Confused, converses, 3= Inappropriate words, 2= Incomprehensible sounds, 1=No response   Motor Response (M): 6   6= Obeys commands, 5= Localizes to pain, 4= Withdrawal to pain, 3=Fexion to pain, 2= Extension to pain,  1= No response   Constitutional: Awake, alert, cooperative, no apparent distress.  ENT: Normocephalic, atraumatic  Respiratory: No increased work of breathing, good air exchange, clear to auscultation bilaterally, no crackles or wheezing.  Cardiovascular:  regular rate and rhythm, normal S1 and S2  GI: Normal bowel sounds, abdomen soft, non-distended,   Genitourinary:  Not seen voids spont  Skin: Warm and dry  Musculoskeletal: There is no redness, warmth, or swelling of the joints.  Pedal pulse palpated.  Neurologic: Awake, alert, oriented. Cranial nerves II-XII are grossly intact.  Strength and sensory is intact. No focal deficits.  Neuropsychiatric: Calm, normal eye contact, alert, affect appropriate to situation, oriented, thought process normal.    REVA Romero CNP  To contact the trauma service use job code pager [phone],   Numeric texts or alpha text through [facility]

## 2022-06-13 ENCOUNTER — ANESTHESIA (OUTPATIENT)
Dept: SURGERY | Facility: CLINIC | Age: 62
End: 2022-06-13
Payer: COMMERCIAL

## 2022-06-13 ENCOUNTER — APPOINTMENT (OUTPATIENT)
Dept: GENERAL RADIOLOGY | Facility: CLINIC | Age: 62
End: 2022-06-13
Attending: STUDENT IN AN ORGANIZED HEALTH CARE EDUCATION/TRAINING PROGRAM
Payer: COMMERCIAL

## 2022-06-13 ENCOUNTER — ANESTHESIA EVENT (OUTPATIENT)
Dept: SURGERY | Facility: CLINIC | Age: 62
End: 2022-06-13
Payer: COMMERCIAL

## 2022-06-13 ENCOUNTER — APPOINTMENT (OUTPATIENT)
Dept: GENERAL RADIOLOGY | Facility: CLINIC | Age: 62
End: 2022-06-13
Attending: NURSE PRACTITIONER
Payer: COMMERCIAL

## 2022-06-13 LAB
ABO/RH(D): NORMAL
ANION GAP SERPL CALCULATED.3IONS-SCNC: 7 MMOL/L (ref 3–14)
ANTIBODY SCREEN: NEGATIVE
APTT PPP: 38 SECONDS (ref 22–38)
BUN SERPL-MCNC: 11 MG/DL (ref 7–30)
CALCIUM SERPL-MCNC: 8.8 MG/DL (ref 8.5–10.1)
CHLORIDE BLD-SCNC: 106 MMOL/L (ref 94–109)
CO2 SERPL-SCNC: 26 MMOL/L (ref 20–32)
CREAT SERPL-MCNC: 0.66 MG/DL (ref 0.66–1.25)
ERYTHROCYTE [DISTWIDTH] IN BLOOD BY AUTOMATED COUNT: 11.9 % (ref 10–15)
GFR SERPL CREATININE-BSD FRML MDRD: >90 ML/MIN/1.73M2
GLUCOSE BLD-MCNC: 203 MG/DL (ref 70–99)
GLUCOSE BLDC GLUCOMTR-MCNC: 165 MG/DL (ref 70–99)
GLUCOSE BLDC GLUCOMTR-MCNC: 195 MG/DL (ref 70–99)
HBA1C MFR BLD: 9.2 % (ref 0–5.6)
HCT VFR BLD AUTO: 38.9 % (ref 40–53)
HGB BLD-MCNC: 12.7 G/DL (ref 13.3–17.7)
HGB BLD-MCNC: 14.7 G/DL (ref 13.3–17.7)
HOLD SPECIMEN: NORMAL
INR PPP: 1.13 (ref 0.85–1.15)
MCH RBC QN AUTO: 34.3 PG (ref 26.5–33)
MCHC RBC AUTO-ENTMCNC: 32.6 G/DL (ref 31.5–36.5)
MCV RBC AUTO: 105 FL (ref 78–100)
PLATELET # BLD AUTO: 200 10E3/UL (ref 150–450)
POTASSIUM BLD-SCNC: 3.6 MMOL/L (ref 3.4–5.3)
RBC # BLD AUTO: 3.7 10E6/UL (ref 4.4–5.9)
SARS-COV-2 RNA RESP QL NAA+PROBE: NEGATIVE
SODIUM SERPL-SCNC: 139 MMOL/L (ref 133–144)
SPECIMEN EXPIRATION DATE: NORMAL
WBC # BLD AUTO: 9 10E3/UL (ref 4–11)

## 2022-06-13 PROCEDURE — 83036 HEMOGLOBIN GLYCOSYLATED A1C: CPT | Performed by: NURSE PRACTITIONER

## 2022-06-13 PROCEDURE — 20936 SP BONE AGRFT LOCAL ADD-ON: CPT | Performed by: STUDENT IN AN ORGANIZED HEALTH CARE EDUCATION/TRAINING PROGRAM

## 2022-06-13 PROCEDURE — 86901 BLOOD TYPING SEROLOGIC RH(D): CPT

## 2022-06-13 PROCEDURE — P9041 ALBUMIN (HUMAN),5%, 50ML: HCPCS | Performed by: NURSE ANESTHETIST, CERTIFIED REGISTERED

## 2022-06-13 PROCEDURE — 120N000003 HC R&B IMCU UMMC

## 2022-06-13 PROCEDURE — 0RGA071 FUSION OF THORACOLUMBAR VERTEBRAL JOINT WITH AUTOLOGOUS TISSUE SUBSTITUTE, POSTERIOR APPROACH, POSTERIOR COLUMN, OPEN APPROACH: ICD-10-PCS | Performed by: STUDENT IN AN ORGANIZED HEALTH CARE EDUCATION/TRAINING PROGRAM

## 2022-06-13 PROCEDURE — 250N000011 HC RX IP 250 OP 636

## 2022-06-13 PROCEDURE — U0005 INFEC AGEN DETEC AMPLI PROBE: HCPCS

## 2022-06-13 PROCEDURE — 250N000013 HC RX MED GY IP 250 OP 250 PS 637: Performed by: STUDENT IN AN ORGANIZED HEALTH CARE EDUCATION/TRAINING PROGRAM

## 2022-06-13 PROCEDURE — 36415 COLL VENOUS BLD VENIPUNCTURE: CPT | Performed by: PHYSICIAN ASSISTANT

## 2022-06-13 PROCEDURE — 8E0WXBF COMPUTER ASSISTED PROCEDURE OF TRUNK REGION, WITH FLUOROSCOPY: ICD-10-PCS | Performed by: STUDENT IN AN ORGANIZED HEALTH CARE EDUCATION/TRAINING PROGRAM

## 2022-06-13 PROCEDURE — 22614 ARTHRD PST TQ 1NTRSPC EA ADD: CPT | Performed by: STUDENT IN AN ORGANIZED HEALTH CARE EDUCATION/TRAINING PROGRAM

## 2022-06-13 PROCEDURE — C1713 ANCHOR/SCREW BN/BN,TIS/BN: HCPCS | Performed by: STUDENT IN AN ORGANIZED HEALTH CARE EDUCATION/TRAINING PROGRAM

## 2022-06-13 PROCEDURE — 250N000025 HC SEVOFLURANE, PER MIN: Performed by: STUDENT IN AN ORGANIZED HEALTH CARE EDUCATION/TRAINING PROGRAM

## 2022-06-13 PROCEDURE — 250N000013 HC RX MED GY IP 250 OP 250 PS 637: Performed by: PHYSICIAN ASSISTANT

## 2022-06-13 PROCEDURE — 22842 INSERT SPINE FIXATION DEVICE: CPT | Performed by: STUDENT IN AN ORGANIZED HEALTH CARE EDUCATION/TRAINING PROGRAM

## 2022-06-13 PROCEDURE — 22327 TREAT THORAX SPINE FRACTURE: CPT | Mod: 51 | Performed by: STUDENT IN AN ORGANIZED HEALTH CARE EDUCATION/TRAINING PROGRAM

## 2022-06-13 PROCEDURE — 0RG7071 FUSION OF 2 TO 7 THORACIC VERTEBRAL JOINTS WITH AUTOLOGOUS TISSUE SUBSTITUTE, POSTERIOR APPROACH, POSTERIOR COLUMN, OPEN APPROACH: ICD-10-PCS | Performed by: STUDENT IN AN ORGANIZED HEALTH CARE EDUCATION/TRAINING PROGRAM

## 2022-06-13 PROCEDURE — 4A11X4G MONITORING OF PERIPHERAL NERVOUS ELECTRICAL ACTIVITY, INTRAOPERATIVE, EXTERNAL APPROACH: ICD-10-PCS | Performed by: STUDENT IN AN ORGANIZED HEALTH CARE EDUCATION/TRAINING PROGRAM

## 2022-06-13 PROCEDURE — 250N000009 HC RX 250: Performed by: NURSE ANESTHETIST, CERTIFIED REGISTERED

## 2022-06-13 PROCEDURE — 22612 ARTHRD PST TQ 1NTRSPC LUMBAR: CPT | Mod: GC | Performed by: STUDENT IN AN ORGANIZED HEALTH CARE EDUCATION/TRAINING PROGRAM

## 2022-06-13 PROCEDURE — 74018 RADEX ABDOMEN 1 VIEW: CPT

## 2022-06-13 PROCEDURE — 61783 SCAN PROC SPINAL: CPT | Mod: 59 | Performed by: STUDENT IN AN ORGANIZED HEALTH CARE EDUCATION/TRAINING PROGRAM

## 2022-06-13 PROCEDURE — 250N000013 HC RX MED GY IP 250 OP 250 PS 637: Performed by: NURSE PRACTITIONER

## 2022-06-13 PROCEDURE — 360N000078 HC SURGERY LEVEL 5, PER MIN: Performed by: STUDENT IN AN ORGANIZED HEALTH CARE EDUCATION/TRAINING PROGRAM

## 2022-06-13 PROCEDURE — 250N000009 HC RX 250: Performed by: STUDENT IN AN ORGANIZED HEALTH CARE EDUCATION/TRAINING PROGRAM

## 2022-06-13 PROCEDURE — 74018 RADEX ABDOMEN 1 VIEW: CPT | Mod: 26 | Performed by: RADIOLOGY

## 2022-06-13 PROCEDURE — 999N000105 HC STATISTIC NO DOCUMENTATION TO SUPPORT CHARGE

## 2022-06-13 PROCEDURE — 250N000011 HC RX IP 250 OP 636: Performed by: NURSE ANESTHETIST, CERTIFIED REGISTERED

## 2022-06-13 PROCEDURE — 250N000011 HC RX IP 250 OP 636: Performed by: PHYSICIAN ASSISTANT

## 2022-06-13 PROCEDURE — 258N000003 HC RX IP 258 OP 636

## 2022-06-13 PROCEDURE — 85027 COMPLETE CBC AUTOMATED: CPT | Performed by: PHYSICIAN ASSISTANT

## 2022-06-13 PROCEDURE — 250N000012 HC RX MED GY IP 250 OP 636 PS 637: Performed by: NURSE PRACTITIONER

## 2022-06-13 PROCEDURE — 710N000010 HC RECOVERY PHASE 1, LEVEL 2, PER MIN: Performed by: STUDENT IN AN ORGANIZED HEALTH CARE EDUCATION/TRAINING PROGRAM

## 2022-06-13 PROCEDURE — 85018 HEMOGLOBIN: CPT | Performed by: NURSE PRACTITIONER

## 2022-06-13 PROCEDURE — 250N000013 HC RX MED GY IP 250 OP 250 PS 637

## 2022-06-13 PROCEDURE — 258N000003 HC RX IP 258 OP 636: Performed by: PHYSICIAN ASSISTANT

## 2022-06-13 PROCEDURE — 0SG0071 FUSION OF LUMBAR VERTEBRAL JOINT WITH AUTOLOGOUS TISSUE SUBSTITUTE, POSTERIOR APPROACH, POSTERIOR COLUMN, OPEN APPROACH: ICD-10-PCS | Performed by: STUDENT IN AN ORGANIZED HEALTH CARE EDUCATION/TRAINING PROGRAM

## 2022-06-13 PROCEDURE — 85610 PROTHROMBIN TIME: CPT

## 2022-06-13 PROCEDURE — 250N000009 HC RX 250

## 2022-06-13 PROCEDURE — 250N000011 HC RX IP 250 OP 636: Performed by: ANESTHESIOLOGY

## 2022-06-13 PROCEDURE — 272N000001 HC OR GENERAL SUPPLY STERILE: Performed by: STUDENT IN AN ORGANIZED HEALTH CARE EDUCATION/TRAINING PROGRAM

## 2022-06-13 PROCEDURE — 99207 PR PREOP VISIT IN GLOBAL PKG: CPT | Performed by: NURSE PRACTITIONER

## 2022-06-13 PROCEDURE — 63003 REMOVE SPINE LAMINA 1/2 THRC: CPT | Mod: 51 | Performed by: STUDENT IN AN ORGANIZED HEALTH CARE EDUCATION/TRAINING PROGRAM

## 2022-06-13 PROCEDURE — 36415 COLL VENOUS BLD VENIPUNCTURE: CPT | Performed by: NURSE PRACTITIONER

## 2022-06-13 PROCEDURE — 99232 SBSQ HOSP IP/OBS MODERATE 35: CPT | Performed by: NURSE PRACTITIONER

## 2022-06-13 PROCEDURE — 20930 SP BONE ALGRFT MORSEL ADD-ON: CPT | Performed by: STUDENT IN AN ORGANIZED HEALTH CARE EDUCATION/TRAINING PROGRAM

## 2022-06-13 PROCEDURE — 999N000141 HC STATISTIC PRE-PROCEDURE NURSING ASSESSMENT: Performed by: STUDENT IN AN ORGANIZED HEALTH CARE EDUCATION/TRAINING PROGRAM

## 2022-06-13 PROCEDURE — 85730 THROMBOPLASTIN TIME PARTIAL: CPT

## 2022-06-13 PROCEDURE — 86850 RBC ANTIBODY SCREEN: CPT

## 2022-06-13 PROCEDURE — 999N000179 XR SURGERY CARM FLUORO LESS THAN 5 MIN W STILLS: Mod: TC

## 2022-06-13 PROCEDURE — 370N000017 HC ANESTHESIA TECHNICAL FEE, PER MIN: Performed by: STUDENT IN AN ORGANIZED HEALTH CARE EDUCATION/TRAINING PROGRAM

## 2022-06-13 PROCEDURE — 258N000003 HC RX IP 258 OP 636: Performed by: NURSE ANESTHETIST, CERTIFIED REGISTERED

## 2022-06-13 PROCEDURE — 80048 BASIC METABOLIC PNL TOTAL CA: CPT | Performed by: PHYSICIAN ASSISTANT

## 2022-06-13 PROCEDURE — C1762 CONN TISS, HUMAN(INC FASCIA): HCPCS | Performed by: STUDENT IN AN ORGANIZED HEALTH CARE EDUCATION/TRAINING PROGRAM

## 2022-06-13 PROCEDURE — 36415 COLL VENOUS BLD VENIPUNCTURE: CPT

## 2022-06-13 DEVICE — IMP ROD MEDT SOLERA LINED 5.5X500MM CHR 1555200500: Type: IMPLANTABLE DEVICE | Site: SPINE THORACIC | Status: FUNCTIONAL

## 2022-06-13 DEVICE — IMP SCR SET MEDT SOLERA BREAK OFF 5.5MM TI 5540030: Type: IMPLANTABLE DEVICE | Site: SPINE THORACIC | Status: FUNCTIONAL

## 2022-06-13 DEVICE — GRAFT BONE MAGNIFUSE 1CMX20CM 7509212: Type: IMPLANTABLE DEVICE | Site: SPINE THORACIC | Status: FUNCTIONAL

## 2022-06-13 DEVICE — IMP SCR MEDT 5.5/6.0MM SOLERA 5.5X50MM MA 55840005550: Type: IMPLANTABLE DEVICE | Site: SPINE THORACIC | Status: FUNCTIONAL

## 2022-06-13 DEVICE — IMP SCR MEDT 5.5/6.0MM SOLERA 6.5X55MM MA 55840006555: Type: IMPLANTABLE DEVICE | Site: SPINE THORACIC | Status: FUNCTIONAL

## 2022-06-13 RX ORDER — SODIUM CHLORIDE, SODIUM GLUCONATE, SODIUM ACETATE, POTASSIUM CHLORIDE AND MAGNESIUM CHLORIDE 526; 502; 368; 37; 30 MG/100ML; MG/100ML; MG/100ML; MG/100ML; MG/100ML
INJECTION, SOLUTION INTRAVENOUS CONTINUOUS PRN
Status: DISCONTINUED | OUTPATIENT
Start: 2022-06-13 | End: 2022-06-13

## 2022-06-13 RX ORDER — ONDANSETRON 2 MG/ML
INJECTION INTRAMUSCULAR; INTRAVENOUS PRN
Status: DISCONTINUED | OUTPATIENT
Start: 2022-06-13 | End: 2022-06-13

## 2022-06-13 RX ORDER — CEFAZOLIN SODIUM/WATER 3 G/30 ML
3 SYRINGE (ML) INTRAVENOUS SEE ADMIN INSTRUCTIONS
Status: DISCONTINUED | OUTPATIENT
Start: 2022-06-13 | End: 2022-06-13 | Stop reason: HOSPADM

## 2022-06-13 RX ORDER — ACETAMINOPHEN 325 MG/1
650 TABLET ORAL EVERY 4 HOURS PRN
Status: DISCONTINUED | OUTPATIENT
Start: 2022-06-16 | End: 2022-06-17 | Stop reason: HOSPADM

## 2022-06-13 RX ORDER — NICOTINE POLACRILEX 4 MG
15-30 LOZENGE BUCCAL
Status: DISCONTINUED | OUTPATIENT
Start: 2022-06-13 | End: 2022-06-17 | Stop reason: HOSPADM

## 2022-06-13 RX ORDER — SODIUM CHLORIDE, SODIUM LACTATE, POTASSIUM CHLORIDE, CALCIUM CHLORIDE 600; 310; 30; 20 MG/100ML; MG/100ML; MG/100ML; MG/100ML
INJECTION, SOLUTION INTRAVENOUS CONTINUOUS
Status: DISCONTINUED | OUTPATIENT
Start: 2022-06-13 | End: 2022-06-13 | Stop reason: HOSPADM

## 2022-06-13 RX ORDER — ONDANSETRON 2 MG/ML
4 INJECTION INTRAMUSCULAR; INTRAVENOUS EVERY 6 HOURS PRN
Status: DISCONTINUED | OUTPATIENT
Start: 2022-06-13 | End: 2022-06-13

## 2022-06-13 RX ORDER — PROPOFOL 10 MG/ML
INJECTION, EMULSION INTRAVENOUS PRN
Status: DISCONTINUED | OUTPATIENT
Start: 2022-06-13 | End: 2022-06-13

## 2022-06-13 RX ORDER — HYDRALAZINE HYDROCHLORIDE 20 MG/ML
2.5-5 INJECTION INTRAMUSCULAR; INTRAVENOUS EVERY 10 MIN PRN
Status: DISCONTINUED | OUTPATIENT
Start: 2022-06-13 | End: 2022-06-13 | Stop reason: HOSPADM

## 2022-06-13 RX ORDER — HYDROMORPHONE HCL IN WATER/PF 6 MG/30 ML
0.4 PATIENT CONTROLLED ANALGESIA SYRINGE INTRAVENOUS
Status: DISCONTINUED | OUTPATIENT
Start: 2022-06-13 | End: 2022-06-17 | Stop reason: HOSPADM

## 2022-06-13 RX ORDER — LIDOCAINE HYDROCHLORIDE AND EPINEPHRINE 10; 10 MG/ML; UG/ML
INJECTION, SOLUTION INFILTRATION; PERINEURAL PRN
Status: DISCONTINUED | OUTPATIENT
Start: 2022-06-13 | End: 2022-06-13 | Stop reason: HOSPADM

## 2022-06-13 RX ORDER — LIDOCAINE 40 MG/G
CREAM TOPICAL
Status: DISCONTINUED | OUTPATIENT
Start: 2022-06-13 | End: 2022-06-13 | Stop reason: HOSPADM

## 2022-06-13 RX ORDER — FENTANYL CITRATE 50 UG/ML
INJECTION, SOLUTION INTRAMUSCULAR; INTRAVENOUS PRN
Status: DISCONTINUED | OUTPATIENT
Start: 2022-06-13 | End: 2022-06-13

## 2022-06-13 RX ORDER — POTASSIUM CHLORIDE 7.45 MG/ML
10 INJECTION INTRAVENOUS
Status: ACTIVE | OUTPATIENT
Start: 2022-06-13 | End: 2022-06-13

## 2022-06-13 RX ORDER — IPRATROPIUM BROMIDE AND ALBUTEROL SULFATE 2.5; .5 MG/3ML; MG/3ML
3 SOLUTION RESPIRATORY (INHALATION) ONCE
Status: CANCELLED | OUTPATIENT
Start: 2022-06-13 | End: 2022-06-13

## 2022-06-13 RX ORDER — FENTANYL CITRATE 50 UG/ML
25-50 INJECTION, SOLUTION INTRAMUSCULAR; INTRAVENOUS EVERY 5 MIN PRN
Status: DISCONTINUED | OUTPATIENT
Start: 2022-06-13 | End: 2022-06-13 | Stop reason: HOSPADM

## 2022-06-13 RX ORDER — SODIUM CHLORIDE 9 MG/ML
INJECTION, SOLUTION INTRAVENOUS CONTINUOUS
Status: DISCONTINUED | OUTPATIENT
Start: 2022-06-13 | End: 2022-06-14

## 2022-06-13 RX ORDER — MAGNESIUM HYDROXIDE 1200 MG/15ML
LIQUID ORAL PRN
Status: DISCONTINUED | OUTPATIENT
Start: 2022-06-13 | End: 2022-06-13 | Stop reason: HOSPADM

## 2022-06-13 RX ORDER — OXYCODONE HYDROCHLORIDE 10 MG/1
10 TABLET ORAL EVERY 4 HOURS PRN
Status: DISCONTINUED | OUTPATIENT
Start: 2022-06-13 | End: 2022-06-17 | Stop reason: HOSPADM

## 2022-06-13 RX ORDER — ALBUMIN, HUMAN INJ 5% 5 %
SOLUTION INTRAVENOUS CONTINUOUS PRN
Status: DISCONTINUED | OUTPATIENT
Start: 2022-06-13 | End: 2022-06-13

## 2022-06-13 RX ORDER — MEPERIDINE HYDROCHLORIDE 25 MG/ML
12.5 INJECTION INTRAMUSCULAR; INTRAVENOUS; SUBCUTANEOUS
Status: DISCONTINUED | OUTPATIENT
Start: 2022-06-13 | End: 2022-06-13 | Stop reason: HOSPADM

## 2022-06-13 RX ORDER — OXYCODONE HYDROCHLORIDE 5 MG/1
5 TABLET ORAL EVERY 4 HOURS PRN
Status: DISCONTINUED | OUTPATIENT
Start: 2022-06-13 | End: 2022-06-17 | Stop reason: HOSPADM

## 2022-06-13 RX ORDER — OXYCODONE HYDROCHLORIDE 5 MG/1
5 TABLET ORAL EVERY 4 HOURS PRN
Status: DISCONTINUED | OUTPATIENT
Start: 2022-06-13 | End: 2022-06-13 | Stop reason: HOSPADM

## 2022-06-13 RX ORDER — ONDANSETRON 4 MG/1
4 TABLET, ORALLY DISINTEGRATING ORAL EVERY 30 MIN PRN
Status: DISCONTINUED | OUTPATIENT
Start: 2022-06-13 | End: 2022-06-13 | Stop reason: HOSPADM

## 2022-06-13 RX ORDER — ONDANSETRON 2 MG/ML
4 INJECTION INTRAMUSCULAR; INTRAVENOUS EVERY 30 MIN PRN
Status: DISCONTINUED | OUTPATIENT
Start: 2022-06-13 | End: 2022-06-13 | Stop reason: HOSPADM

## 2022-06-13 RX ORDER — ACETAMINOPHEN 325 MG/1
975 TABLET ORAL EVERY 8 HOURS
Status: COMPLETED | OUTPATIENT
Start: 2022-06-13 | End: 2022-06-16

## 2022-06-13 RX ORDER — PANTOPRAZOLE SODIUM 40 MG/1
40 TABLET, DELAYED RELEASE ORAL
Status: DISCONTINUED | OUTPATIENT
Start: 2022-06-13 | End: 2022-06-14

## 2022-06-13 RX ORDER — CEFAZOLIN SODIUM/WATER 2 G/20 ML
2 SYRINGE (ML) INTRAVENOUS EVERY 8 HOURS
Status: DISCONTINUED | OUTPATIENT
Start: 2022-06-14 | End: 2022-06-14

## 2022-06-13 RX ORDER — LIDOCAINE 40 MG/G
CREAM TOPICAL
Status: DISCONTINUED | OUTPATIENT
Start: 2022-06-13 | End: 2022-06-17 | Stop reason: HOSPADM

## 2022-06-13 RX ORDER — MAGNESIUM SULFATE HEPTAHYDRATE 40 MG/ML
INJECTION, SOLUTION INTRAVENOUS PRN
Status: DISCONTINUED | OUTPATIENT
Start: 2022-06-13 | End: 2022-06-13

## 2022-06-13 RX ORDER — HYDROMORPHONE HYDROCHLORIDE 1 MG/ML
.2-.4 INJECTION, SOLUTION INTRAMUSCULAR; INTRAVENOUS; SUBCUTANEOUS EVERY 10 MIN PRN
Status: DISCONTINUED | OUTPATIENT
Start: 2022-06-13 | End: 2022-06-13 | Stop reason: HOSPADM

## 2022-06-13 RX ORDER — FENTANYL CITRATE 50 UG/ML
25-50 INJECTION, SOLUTION INTRAMUSCULAR; INTRAVENOUS
Status: DISCONTINUED | OUTPATIENT
Start: 2022-06-13 | End: 2022-06-13 | Stop reason: HOSPADM

## 2022-06-13 RX ORDER — IPRATROPIUM BROMIDE AND ALBUTEROL SULFATE 2.5; .5 MG/3ML; MG/3ML
3 SOLUTION RESPIRATORY (INHALATION) ONCE
Status: COMPLETED | OUTPATIENT
Start: 2022-06-13 | End: 2022-06-13

## 2022-06-13 RX ORDER — PHENYLEPHRINE HCL IN 0.9% NACL 50MG/250ML
.1-1 PLASTIC BAG, INJECTION (ML) INTRAVENOUS CONTINUOUS
Status: DISCONTINUED | OUTPATIENT
Start: 2022-06-13 | End: 2022-06-13 | Stop reason: HOSPADM

## 2022-06-13 RX ORDER — CEFAZOLIN SODIUM/WATER 3 G/30 ML
3 SYRINGE (ML) INTRAVENOUS
Status: COMPLETED | OUTPATIENT
Start: 2022-06-13 | End: 2022-06-13

## 2022-06-13 RX ORDER — PROCHLORPERAZINE MALEATE 5 MG
10 TABLET ORAL EVERY 6 HOURS PRN
Status: DISCONTINUED | OUTPATIENT
Start: 2022-06-13 | End: 2022-06-13

## 2022-06-13 RX ORDER — DEXTROSE MONOHYDRATE 25 G/50ML
25-50 INJECTION, SOLUTION INTRAVENOUS
Status: DISCONTINUED | OUTPATIENT
Start: 2022-06-13 | End: 2022-06-17 | Stop reason: HOSPADM

## 2022-06-13 RX ORDER — ONDANSETRON 4 MG/1
4 TABLET, ORALLY DISINTEGRATING ORAL EVERY 6 HOURS PRN
Status: DISCONTINUED | OUTPATIENT
Start: 2022-06-13 | End: 2022-06-13

## 2022-06-13 RX ORDER — HYDROMORPHONE HCL IN WATER/PF 6 MG/30 ML
0.2 PATIENT CONTROLLED ANALGESIA SYRINGE INTRAVENOUS
Status: DISCONTINUED | OUTPATIENT
Start: 2022-06-13 | End: 2022-06-17 | Stop reason: HOSPADM

## 2022-06-13 RX ADMIN — Medication 3 G: at 18:42

## 2022-06-13 RX ADMIN — SENNOSIDES AND DOCUSATE SODIUM 2 TABLET: 8.6; 5 TABLET ORAL at 09:31

## 2022-06-13 RX ADMIN — Medication 3 G: at 14:51

## 2022-06-13 RX ADMIN — TRAMADOL HYDROCHLORIDE 50 MG: 50 TABLET, COATED ORAL at 03:30

## 2022-06-13 RX ADMIN — FENTANYL CITRATE 25 MCG: 50 INJECTION, SOLUTION INTRAMUSCULAR; INTRAVENOUS at 19:34

## 2022-06-13 RX ADMIN — PHENYLEPHRINE HYDROCHLORIDE 0.2 MCG/KG/MIN: 10 INJECTION INTRAVENOUS at 13:58

## 2022-06-13 RX ADMIN — MAGNESIUM SULFATE HEPTAHYDRATE 2 G: 40 INJECTION, SOLUTION INTRAVENOUS at 15:23

## 2022-06-13 RX ADMIN — HYDROMORPHONE HYDROCHLORIDE 0.5 MG: 1 INJECTION, SOLUTION INTRAMUSCULAR; INTRAVENOUS; SUBCUTANEOUS at 18:48

## 2022-06-13 RX ADMIN — HYDROMORPHONE HYDROCHLORIDE 0.3 MG: 1 INJECTION, SOLUTION INTRAMUSCULAR; INTRAVENOUS; SUBCUTANEOUS at 04:15

## 2022-06-13 RX ADMIN — Medication 1 UNITS: at 15:14

## 2022-06-13 RX ADMIN — PHENYLEPHRINE HYDROCHLORIDE 200 MCG: 10 INJECTION INTRAVENOUS at 14:47

## 2022-06-13 RX ADMIN — Medication 20 MG: at 14:56

## 2022-06-13 RX ADMIN — FAMOTIDINE 10 MG: 10 TABLET, FILM COATED ORAL at 09:32

## 2022-06-13 RX ADMIN — PHENYLEPHRINE HYDROCHLORIDE 200 MCG: 10 INJECTION INTRAVENOUS at 14:32

## 2022-06-13 RX ADMIN — LISINOPRIL 20 MG: 20 TABLET ORAL at 09:32

## 2022-06-13 RX ADMIN — Medication 10 MG: at 17:48

## 2022-06-13 RX ADMIN — Medication 10 MG: at 16:16

## 2022-06-13 RX ADMIN — HYDROMORPHONE HYDROCHLORIDE 0.5 MG: 1 INJECTION, SOLUTION INTRAMUSCULAR; INTRAVENOUS; SUBCUTANEOUS at 17:18

## 2022-06-13 RX ADMIN — Medication 10 MG: at 15:34

## 2022-06-13 RX ADMIN — FENTANYL CITRATE 100 MCG: 50 INJECTION, SOLUTION INTRAMUSCULAR; INTRAVENOUS at 14:13

## 2022-06-13 RX ADMIN — ALBUMIN (HUMAN): 12.5 SOLUTION INTRAVENOUS at 17:46

## 2022-06-13 RX ADMIN — FENTANYL CITRATE 50 MCG: 50 INJECTION, SOLUTION INTRAMUSCULAR; INTRAVENOUS at 19:48

## 2022-06-13 RX ADMIN — Medication 1 UNITS: at 15:06

## 2022-06-13 RX ADMIN — FLUTICASONE FUROATE 1 PUFF: 100 POWDER RESPIRATORY (INHALATION) at 09:33

## 2022-06-13 RX ADMIN — METHOCARBAMOL 750 MG: 750 TABLET ORAL at 11:28

## 2022-06-13 RX ADMIN — INSULIN ASPART 2 UNITS: 100 INJECTION, SOLUTION INTRAVENOUS; SUBCUTANEOUS at 21:57

## 2022-06-13 RX ADMIN — PHENYLEPHRINE HYDROCHLORIDE 200 MCG: 10 INJECTION INTRAVENOUS at 14:27

## 2022-06-13 RX ADMIN — Medication 10 MG: at 17:01

## 2022-06-13 RX ADMIN — GABAPENTIN 300 MG: 300 CAPSULE ORAL at 09:32

## 2022-06-13 RX ADMIN — METHOCARBAMOL 750 MG: 750 TABLET ORAL at 09:32

## 2022-06-13 RX ADMIN — PHENYLEPHRINE HYDROCHLORIDE 0.5 MCG/KG/MIN: 10 INJECTION INTRAVENOUS at 15:19

## 2022-06-13 RX ADMIN — PROPOFOL 200 MG: 10 INJECTION, EMULSION INTRAVENOUS at 14:13

## 2022-06-13 RX ADMIN — OXYCODONE HYDROCHLORIDE 10 MG: 10 TABLET ORAL at 21:20

## 2022-06-13 RX ADMIN — FENTANYL CITRATE 100 MCG: 50 INJECTION, SOLUTION INTRAMUSCULAR; INTRAVENOUS at 16:01

## 2022-06-13 RX ADMIN — SODIUM CHLORIDE, SODIUM GLUCONATE, SODIUM ACETATE, POTASSIUM CHLORIDE AND MAGNESIUM CHLORIDE: 526; 502; 368; 37; 30 INJECTION, SOLUTION INTRAVENOUS at 14:01

## 2022-06-13 RX ADMIN — POTASSIUM CHLORIDE, DEXTROSE MONOHYDRATE AND SODIUM CHLORIDE: 150; 5; 450 INJECTION, SOLUTION INTRAVENOUS at 02:55

## 2022-06-13 RX ADMIN — FENTANYL CITRATE 100 MCG: 50 INJECTION, SOLUTION INTRAMUSCULAR; INTRAVENOUS at 15:34

## 2022-06-13 RX ADMIN — PHENYLEPHRINE HYDROCHLORIDE 100 MCG: 10 INJECTION INTRAVENOUS at 14:55

## 2022-06-13 RX ADMIN — POLYETHYLENE GLYCOL 3350 17 G: 17 POWDER, FOR SOLUTION ORAL at 09:29

## 2022-06-13 RX ADMIN — Medication 70 MG: at 14:13

## 2022-06-13 RX ADMIN — SODIUM CHLORIDE, SODIUM GLUCONATE, SODIUM ACETATE, POTASSIUM CHLORIDE AND MAGNESIUM CHLORIDE: 526; 502; 368; 37; 30 INJECTION, SOLUTION INTRAVENOUS at 15:20

## 2022-06-13 RX ADMIN — ACETAMINOPHEN 975 MG: 325 TABLET ORAL at 21:20

## 2022-06-13 RX ADMIN — ACETAMINOPHEN 975 MG: 325 TABLET, FILM COATED ORAL at 09:31

## 2022-06-13 RX ADMIN — SODIUM CHLORIDE: 9 INJECTION, SOLUTION INTRAVENOUS at 21:35

## 2022-06-13 RX ADMIN — PANTOPRAZOLE SODIUM 40 MG: 40 TABLET, DELAYED RELEASE ORAL at 10:07

## 2022-06-13 RX ADMIN — ONDANSETRON 4 MG: 2 INJECTION INTRAMUSCULAR; INTRAVENOUS at 18:15

## 2022-06-13 RX ADMIN — SODIUM CHLORIDE, SODIUM GLUCONATE, SODIUM ACETATE, POTASSIUM CHLORIDE AND MAGNESIUM CHLORIDE: 526; 502; 368; 37; 30 INJECTION, SOLUTION INTRAVENOUS at 17:15

## 2022-06-13 RX ADMIN — SUGAMMADEX 300 MG: 100 INJECTION, SOLUTION INTRAVENOUS at 19:14

## 2022-06-13 RX ADMIN — IPRATROPIUM BROMIDE AND ALBUTEROL SULFATE 3 ML: 2.5; .5 SOLUTION RESPIRATORY (INHALATION) at 13:32

## 2022-06-13 RX ADMIN — FLUTICASONE PROPIONATE 2 SPRAY: 50 SPRAY, METERED NASAL at 09:33

## 2022-06-13 RX ADMIN — PHENYLEPHRINE HYDROCHLORIDE 100 MCG: 10 INJECTION INTRAVENOUS at 16:09

## 2022-06-13 RX ADMIN — FENTANYL CITRATE 50 MCG: 50 INJECTION, SOLUTION INTRAMUSCULAR; INTRAVENOUS at 16:25

## 2022-06-13 RX ADMIN — FENTANYL CITRATE 25 MCG: 50 INJECTION, SOLUTION INTRAMUSCULAR; INTRAVENOUS at 19:40

## 2022-06-13 RX ADMIN — PHENYLEPHRINE HYDROCHLORIDE 200 MCG: 10 INJECTION INTRAVENOUS at 14:36

## 2022-06-13 ASSESSMENT — ACTIVITIES OF DAILY LIVING (ADL)
ADLS_ACUITY_SCORE: 34
ADLS_ACUITY_SCORE: 28
ADLS_ACUITY_SCORE: 34
ADLS_ACUITY_SCORE: 28
ADLS_ACUITY_SCORE: 32
ADLS_ACUITY_SCORE: 28
ADLS_ACUITY_SCORE: 34
ADLS_ACUITY_SCORE: 32
ADLS_ACUITY_SCORE: 34
ADLS_ACUITY_SCORE: 28

## 2022-06-13 NOTE — PROGRESS NOTES
"[facility]  Trauma Service Progress Note    Date of Service (when I saw the patient): 06/13/2022     Assessment & Plan   Trauma Mechanism: Mechanical Fall backwards   Date of Injury: 6/7/22 (2 days prior to admission)  Known Injuries:  1. T12 burst fracture, involving > 50% of spinal canal.      Neuro/Pain/Psych:  # Acute traumatic pain   - Scheduled: Tylenol, Robaxin. Lidoderm, Gabapentin 300mg for nerve pain  - PRN: Ultram  - Maintain circadian rhythm. Lights on during the day. Off at night, minimize cares at night. OOB during the day.     Pulmonary:  # Hx Mild persistent asthma  # Pulmonary nodules noted on CT  PTA Fluticasone inhaler and nasal spray  - Supplemental oxygen to keep saturation above 92 %.  - Incentive spirometer while awake    - Pulmonary nodule follow up outpatient per guidelines in 6-12 months     Cardiovascular:    # Hypertension   - continue PTA: Lisinopril   - Prn: hydralazine, labetalol as needed for SBP >160     GI/Nutrition:    #Concern for early developing ileus  # GERD  Large emesis 06/11, dark, gastric occult (+) XR's of the abdomen obtained with distended loops of large and small bowel.  NG placed for decompression, minimal output x 24hrs (350ml), denies nausea, multiple soft BM's after enema yesterday.  Follow up abdominal film today, improved, remove NG, initial plan to initiate clears prior to surgical plan below, thus NPO  - Encourage mobility and minimize opoids  - Daily PPI can discontinue if not indicated, initial concern for UGI with \"dark colored\" emesis, + gastric occult   - Optimize electrolytes to keep K>4.0, mag >2.0     Renal/ Fluids/Electrolytes:  - CT findings of Nonobstructing 0.3 cm right renal stone.   - electrolyte replacement protocol in place.      Endocrine:  # Hyperglycemia  Hbg A1c >9, blood glucose levels in the 200's, not on any medications PTA  - Initiated medium correction Novolog sliding scale  - Glucose checks " every 4 hours while NPO     Infectious disease:   -  No indications for antibiotics.      Hematology:    - Hgb 14.7 today,   - Threshold for transfusion if hgb <7.0 or signs/symptoms of hypoperfusion.        Musculoskeletal:  # T12 vertebral body fracture with retropulsion  - Neurosurgery following: planned surgery later today, made NPO, pre-op labs ordered  - TLSO brace when upright, XR's completed  - Physical and occupational therapy consults.    Lines/ tubes/ drains:  - PIV         General Cares:                 PPI/H2 blocker:  NA                DVT prophylaxis:SCD's                Bowel Regimen/Date of last stool: 06/13                Pulmonary toilet: IS     Code status:  Full Code                   Discharge goals:     Adequate pain management: yes    VSS x24 hours: yes    Hemoglobin stable x 48 hours: NA    Ambulating safely and/or therapy evals complete: yes    Drains/lines removed or plan in place to manage: yes    Teaching done: yes    Other:  Expected D/C date: anticipated surgery today    Interval History   Feels less distended, bloated, passing flatus, multiple soft BM's yesterday, denies nausea. Slept in brace all night was cautioned about removing while sleeping to prevent skin breakdown  ROS x 8 negative with exception of those things listed in interval hx    Physical Exam   Temp: 98.2  F (36.8  C) Temp src: Oral BP: (!) 158/91 Pulse: 101   Resp: 18 SpO2: 97 % O2 Device: None (Room air)    Vitals:    06/11/22 0543 06/12/22 0544 06/13/22 0518   Weight: 130.4 kg (287 lb 8 oz) 133.4 kg (294 lb 3.2 oz) 135.2 kg (298 lb)     Vital Signs with Ranges  Temp:  [97.9  F (36.6  C)-98.7  F (37.1  C)] 98.2  F (36.8  C)  Pulse:  [101-116] 101  Resp:  [18] 18  BP: (150-158)/(87-97) 158/91  Cuff Mean (mmHg):  [116] 116  SpO2:  [95 %-99 %] 97 %  I/O last 3 completed shifts:  In: 2660 [I.V.:2500; NG/GT:160]  Out: 1325 [Urine:1025; Emesis/NG output:300]    Corbin Coma Scale - Total 15/15  Eye Response (E): 4   4=  spontaneous, 3= to verbal/voice, 2= to pain, 1= No response   Verbal Response (V): 5   5= Orientated, converses, 4= Confused, converses, 3= Inappropriate words, 2= Incomprehensible sounds, 1=No response   Motor Response (M): 6   6= Obeys commands, 5= Localizes to pain, 4= Withdrawal to pain, 3=Fexion to pain, 2= Extension to pain, 1= No response   Constitutional: Awake, alert, cooperative, no apparent distress.  ENT: Normocephalic, atraumatic  Respiratory: Clear bilaterally without increased work of breathing  Cardiovascular:  regular rate and rhythm, normal S1 and S2  GI: Normal bowel sounds, abdomen soft, non-distended, non-tender, no guarding  Genitourinary:  Not seen voids  spont  Skin:  Warm and dry  Musculoskeletal: There is no redness, warmth, or swelling of the joints.  Pedal pulse palpated.  Neurologic: Awake, alert, oriented. Strength and sensory is intact. No focal deficits.  Neuropsychiatric: Calm, normal eye contact, alert, affect appropriate to situation, oriented, thought process normal.    REVA Romero CNP  To contact the trauma service use job code pager [phone],   Numeric texts or alpha text through [facility]

## 2022-06-13 NOTE — ANESTHESIA PREPROCEDURE EVALUATION
Anesthesia Pre-Procedure Evaluation    Patient: Kalen Callejas   MRN: [id number] : 1960        Procedure : Procedure(s):  THORACIC 10 LUMBAR 2, POSTERIOR INSTRUMENTED FUSION, POSS PERCUTANEOUS SCREWS, POSS ADDITIONAL LEVELS WITH SELF-GUIDANCE          No past medical history on file.   No past surgical history on file.   Allergies   Allergen Reactions     Bee Venom       Social History     Tobacco Use     Smoking status: Not on file     Smokeless tobacco: Not on file   Substance Use Topics     Alcohol use: Not on file      Wt Readings from Last 1 Encounters:   22 135.2 kg (298 lb)        Anesthesia Evaluation   Pt has had prior anesthetic.     No history of anesthetic complications       ROS/MED HX  ENT/Pulmonary: Comment: Incidental pulmonary nodules noted on CT scan    (+) Moderate Persistent, asthma Last exacerbation: 3/10/22,  Treatment: Inhaled steroids,      Neurologic: Comment: T12 burst fracture after fall from standing height - unstable has TLSO brace      Cardiovascular:     (+) hypertension-----    METS/Exercise Tolerance: 4 - Raking leaves, gardening    Hematologic:  - neg hematologic  ROS     Musculoskeletal: Comment: T12 burst fracture  (+) fracture, Fracture location: Other Spine,     GI/Hepatic: Comment: Concern for ileus over the weekend with large emesis - feels better after NG tube placement for decompression. NG removed this morning - patient denies nausea or vomiting since removal    (+) GERD,     Renal/Genitourinary:     (+) Nephrolithiasis ,     Endo:     (+) type II DM (Newly diagnosed), Last HgA1c: 9.2, date: , Obesity,     Psychiatric/Substance Use:  - neg psychiatric ROS     Infectious Disease: Comment: 22 COVID negative      Malignancy:       Other:  - neg other ROS          Physical Exam    Airway        Mallampati: III   TM distance: > 3 FB   Neck ROM: full   Mouth opening: > 3 cm    Respiratory Devices and Support         Dental  no notable dental history        B=Bridge, C=Chipped, L=Loose, M=Missing    Cardiovascular          Rhythm and rate: regular and tachycardia     Pulmonary           breath sounds clear to auscultation   (+) decreased breath sounds           OUTSIDE LABS:  CBC:   Lab Results   Component Value Date    WBC 9.0 06/13/2022    WBC 9.4 06/12/2022    HGB 14.7 06/13/2022    HGB 12.7 (L) 06/13/2022    HCT 38.9 (L) 06/13/2022    HCT 46.4 06/12/2022     06/13/2022     06/12/2022     BMP:   Lab Results   Component Value Date     06/13/2022     06/12/2022    POTASSIUM 3.6 06/13/2022    POTASSIUM 4.3 06/12/2022    CHLORIDE 106 06/13/2022    CHLORIDE 107 06/12/2022    CO2 26 06/13/2022    CO2 25 06/12/2022    BUN 11 06/13/2022    BUN 19 06/12/2022    CR 0.66 06/13/2022    CR 0.76 06/12/2022     (H) 06/13/2022     (H) 06/12/2022     COAGS:   Lab Results   Component Value Date    INR 1.07 06/09/2022     POC: No results found for: BGM, HCG, HCGS  HEPATIC: No results found for: ALBUMIN, PROTTOTAL, ALT, AST, GGT, ALKPHOS, BILITOTAL, BILIDIRECT, MEGAN  OTHER:   Lab Results   Component Value Date    A1C 9.2 (H) 06/13/2022    MARGE 8.8 06/13/2022    PHOS 2.9 06/11/2022    MAG 2.1 06/11/2022       Anesthesia Plan    ASA Status:  3   NPO Status:  NPO Appropriate    Anesthesia Type: General.     - Airway: ETT         Techniques and Equipment:     - Lines/Monitors: 2nd IV, Arterial Line     Consents    Anesthesia Plan(s) and associated risks, benefits, and realistic alternatives discussed. Questions answered and patient/representative(s) expressed understanding.    - Discussed:     - Discussed with:  Patient      - Extended Intubation/Ventilatory Support Discussed: Yes.      - Patient is DNR/DNI Status: No    Use of blood products discussed: Yes.     - Discussed with: Patient.     - Consented: consented to blood products            Reason for refusal: other.     Postoperative Care    Pain management: Multi-modal analgesia, IV  analgesics.   PONV prophylaxis: Ondansetron (or other 5HT-3), Dexamethasone or Solumedrol     Comments:                Naz Anand MD

## 2022-06-13 NOTE — ANESTHESIA PROCEDURE NOTES
Airway       Patient location during procedure: OR       Procedure Start/Stop Times: 6/13/2022 2:16 PM  Staff -        Anesthesiologist:  Jaron Danielle MD       Resident/Fellow: Vicente Bill MD       Performed By: resident  Consent for Airway        Urgency: elective  Indications and Patient Condition       Indications for airway management: kaitlynn-procedural       Induction type:intravenous       Mask difficulty assessment: 2 - vent by mask + OA or adjuvant +/- NMBA    Final Airway Details       Final airway type: endotracheal airway       Successful airway: ETT - single  Endotracheal Airway Details        ETT size (mm): 7.5       Cuffed: yes       Successful intubation technique: direct laryngoscopy       DL Blade Type: MAC 4 (CMAC Mac-4 blade used as direct laryngoscopy)       Grade View of Cords: 2       Adjucts: stylet       Position: Center       Measured from: gums/teeth       Secured at (cm): 24       Bite block used: None    Post intubation assessment        Number of attempts at approach: 1       Number of other approaches attempted: 0       Secured with: pink tape and commercial tube nelson       Ease of procedure: easy       Dentition: Intact and Unchanged    Medication(s) Administered   Medication Administration Time: 6/13/2022 2:16 PM

## 2022-06-13 NOTE — ANESTHESIA PROCEDURE NOTES
Arterial Line Procedure Note    Pre-Procedure   Staff -        Anesthesiologist:  Jaron Danielle MD       Resident/Fellow: Vicente Bill MD       Performed By: resident       Location: OR       Pre-Anesthestic Checklist: patient identified, IV checked, risks and benefits discussed, informed consent, monitors and equipment checked, pre-op evaluation and at physician/surgeon's request  Timeout:       Correct Patient: Yes        Correct Procedure: Yes        Correct Site: Yes        Correct Position: Yes   Line Placement:   This line was placed Post Induction  Procedure   Procedure: arterial line       Laterality: left       Insertion Site: radial.  Sterile Prep        Standard elements of sterile barrier followed       Skin prep: Chloraprep  Insertion/Injection        Technique: Seldinger Technique and ultrasound guided        1. Ultrasound was used to evaluate the access site.       2. Artery evaluated via ultrasound for patency/adequacy.       Catheter Type/Size: 20 G, 12 cm  Narrative         Secured by: suture       Tegaderm dressing used.       Complications: None apparent,        Arterial waveform: Yes        IBP within 10% of NIBP: Yes

## 2022-06-13 NOTE — PLAN OF CARE
BP (!) 144/103 (BP Location: Left arm)   Pulse 108   Temp 98.6  F (37  C) (Oral)   Resp 18   Wt 135.2 kg (298 lb)   SpO2 96%   BMI 37.75 kg/m      Neuro: A&Ox4. Able to make needs known  Cardiac: SR. VSS.   Respiratory: RA  GI/: Adequate urine output. BM X1, loose and watery. *previous bowel distension which NG was placed so strict bowel regimen.   Diet/appetite: Tolerating NPO diet  Activity:  Assist of 2 with turns. Too much pain when trying to move to side of bed.   Pain: At acceptable level on current regimen. --robaxin helping a lot.   Skin:bruising from fall on L posterior shoulder and coccyx.   LDA's: PIV    Plan: Continue with POC. Notify primary team with changes. NG tube removed today because of improved bowel xray ,laminectomy in process.

## 2022-06-13 NOTE — PLAN OF CARE
Neuro: A&Ox4.   Cardiac: ST. VSS.   Respiratory: Sating upper 90's on RA.  GI/: Adequate urine output. Voids in urinal. BM X2. Incontinent  Diet/appetite: NPO diet. NG tube to LIWS  Activity:  Assist of x2, up to commode.  Pain: At acceptable level on current regimen.   Skin: No new deficits noted.  LDA's: PIV    Plan: Continue with POC. Notify primary team with changes.

## 2022-06-13 NOTE — PROGRESS NOTES
[facility], [place]     Neurosurgery Progress Note:  06/13/2022      Interval History:  Patient states pain is improving.  X-rays completed, planning on the OR today for decompression and fusion with Dr. Akins.     Assessment:  62-year-old male with T12 burst fracture.  Intact on exam.  Burst component appears to involve greater than 50% of spinal canal.  Will need MRI to better visualize degree of compression however will likely require decompression and fusion in this admission given the unstable nature.  He was fit with TLSO and underwent upright XR, demonstrating stable spinal alignment while upright.    Clinically Significant Risk Factors Present on Admission                Recommendations:  TLSO when HOB > 30 or when OOB  Upright XR--complete, stable  Spoke with Dr. Akins, Will proceed with operative intervention, T10-L2 posterior fusion and decompression.  Patient will be added on to the OR scheduled.   Please keep patient NPO  -----------------------------------  REVA Doe, CNP  Department of Neurosurgery  Pager: [phone]     -----------------------------------      General: Nonacute distress, appearing stated age, slightly anxious regarding this fracture and transfer  HEENT: Atraumatic, normocephalic  PULM: Breathing comfortably on room air  MSK: Midline and paraspinal thoracic lower thoracic/upper lumbar tenderness  NEUROLOGIC:  -- Awake; Alert; oriented x 3  -- Follows commands briskly  -- +repetition, calculation, and naming  -- Speech fluent, spontaneous. No aphasia or dysarthria.  -- no gaze preference. No apparent hemineglect.  Cranial Nerves:  -- visual fields full to confrontation, PERRL 3-2mm bilat and brisk, extraocular movements intact  -- face symmetrical, tongue midline  -- sensory V1-V3 intact bilaterally  -- palate elevates symmetrically, uvula midline  -- hearing grossly intact bilat  -- Trapezii 5/5 strength bilat symmetric  -- Cerebellar: Finger nose  finger without dysmetria, intact rapid alternating motions bilaterally     Motor:  Normal bulk / tone; no tremor, rigidity, or bradykinesia.  No muscle wasting or fasciculations  No Pronator Drift       Delt Bi Tri Hand Flexion/  Extension Iliopsoas Quadriceps Hamstrings Tibialis Anterior Gastroc     C5 C6 C7 C8/T1 L2 L3 L4-S1 L4 S1   R 5 5 5 5 5 5 5 5 5   L 5 5 5 5 5 5 5 5 5      Sensory:  intact to LT x 4 extremities        Reflexes: no clonus       Bi Tri BR Jarad Pat Ach Bab     C5-6 C7-8 C6 UMN L2-4 S1 UMN   R 2+ 2+ 2+ Norm 2+ 2+ Norm   L 2+ 2+ 2+ Norm 2+ 2+ Norm      Gait: deferred     Objective:   Temp:  [97.9  F (36.6  C)-98.7  F (37.1  C)] 98.2  F (36.8  C)  Pulse:  [101-116] 101  Resp:  [18] 18  BP: (150-158)/(87-97) 158/91  Cuff Mean (mmHg):  [116] 116  SpO2:  [95 %-99 %] 97 %  I/O last 3 completed shifts:  In: 2660 [I.V.:2500; NG/GT:160]  Out: 1325 [Urine:1025; Emesis/NG output:300]        LABS:  Recent Labs   Lab 06/13/22  0723 06/12/22  0554 06/11/22  0821 06/11/22  0736 06/10/22  1250 06/10/22  0524    138  --   --   --  142   POTASSIUM 3.6 4.3 4.4  --    < > 3.4   CHLORIDE 106 107  --   --   --  106   CO2 26 25  --   --   --  24   ANIONGAP 7 6  --   --   --  12   * 214*  --  166*  --  145*   BUN 11 19  --   --   --  14   CR 0.66 0.76  --   --   --  0.57*   MARGE 8.8 9.4  --   --   --  8.9    < > = values in this interval not displayed.       Recent Labs   Lab 06/13/22  0930 06/13/22  0543   WBC  --  9.0   RBC  --  3.70*   HGB 14.7 12.7*   HCT  --  38.9*   MCV  --  105*   MCH  --  34.3*   MCHC  --  32.6   RDW  --  11.9   PLT  --  200       IMAGING:  Recent Results (from the past 24 hour(s))   XR Abdomen Port 1 View    Narrative    EXAM: XR ABDOMEN PORT 1 VIEWS  6/12/2022 1:29 PM     HISTORY:  follow up ileus       TECHNIQUE: Single frontal radiograph of the abdomen    COMPARISON:  Abdominal radiograph 6/11/2022. CT abdomen and pelvis  6/9/2022.    FINDINGS:   Portable supine view of the  abdomen. Gastric tube tip and sidehole  projected over the stomach. Several distended loops of small and large  bowel are noted. For example the transverse colon measures up to 6.5  cm in diameter. A small bowel loop along the left lower quadrant  measures up to 3.5 cm. No pneumatosis or portal venous gas.      Impression    IMPRESSION: Several dilated loops of large and small bowel are  present, grossly unchanged when compared to 6/11/2022 radiograph.  Findings most compatible with adynamic ileus.     I have personally reviewed the examination and initial interpretation  and I agree with the findings.    AMRIK KNOWLES MD         SYSTEM ID:  [id number]   XR Abdomen Port 1 View    Narrative    Exam: XR ABDOMEN PORT 1 VIEWS, 6/13/2022 9:28 AM    Indication: Follow up ileus    Comparison: Abdomen 6/12/2022    Findings:   Portable AP supine abdominal radiographs. Gastric tube tip and  sidehole overlying the proximal stomach, unchanged from prior study.  Gas-filled loops of large and small bowel throughout the abdomen  demonstrated. Transverse colon has decreased in caliber, now 5.6 cm.  Small bowel loops measure up to 3.7 cm. Degenerative changes. The  known T12 fracture is not well-visualized on the radiograph although  there does appear to be some loss of height along the superior  endplate. Patient also had a right 11th rib fracture on CT.  Calcification overlying the pubic symphysis stable, present within the  prostate on CT 6/9/2022.      Impression    Impression: Ongoing gaseous distention of bowel with slightly  decreased caliber of the transverse colon.    KATELYN BARNES MD         SYSTEM ID:  [id number]

## 2022-06-14 ENCOUNTER — APPOINTMENT (OUTPATIENT)
Dept: PHYSICAL THERAPY | Facility: CLINIC | Age: 62
End: 2022-06-14
Payer: COMMERCIAL

## 2022-06-14 LAB
ANION GAP SERPL CALCULATED.3IONS-SCNC: 7 MMOL/L (ref 3–14)
BUN SERPL-MCNC: 12 MG/DL (ref 7–30)
CALCIUM SERPL-MCNC: 8.2 MG/DL (ref 8.5–10.1)
CHLORIDE BLD-SCNC: 106 MMOL/L (ref 94–109)
CO2 SERPL-SCNC: 27 MMOL/L (ref 20–32)
CREAT SERPL-MCNC: 0.74 MG/DL (ref 0.66–1.25)
ERYTHROCYTE [DISTWIDTH] IN BLOOD BY AUTOMATED COUNT: 11.9 % (ref 10–15)
GFR SERPL CREATININE-BSD FRML MDRD: >90 ML/MIN/1.73M2
GLUCOSE BLD-MCNC: 157 MG/DL (ref 70–99)
GLUCOSE BLDC GLUCOMTR-MCNC: 136 MG/DL (ref 70–99)
GLUCOSE BLDC GLUCOMTR-MCNC: 140 MG/DL (ref 70–99)
GLUCOSE BLDC GLUCOMTR-MCNC: 145 MG/DL (ref 70–99)
GLUCOSE BLDC GLUCOMTR-MCNC: 145 MG/DL (ref 70–99)
GLUCOSE BLDC GLUCOMTR-MCNC: 158 MG/DL (ref 70–99)
GLUCOSE BLDC GLUCOMTR-MCNC: 170 MG/DL (ref 70–99)
GLUCOSE BLDC GLUCOMTR-MCNC: 196 MG/DL (ref 70–99)
HCT VFR BLD AUTO: 38.2 % (ref 40–53)
HGB BLD-MCNC: 12.8 G/DL (ref 13.3–17.7)
MAGNESIUM SERPL-MCNC: 2.5 MG/DL (ref 1.6–2.3)
MCH RBC QN AUTO: 34.5 PG (ref 26.5–33)
MCHC RBC AUTO-ENTMCNC: 33.5 G/DL (ref 31.5–36.5)
MCV RBC AUTO: 103 FL (ref 78–100)
PHOSPHATE SERPL-MCNC: 3.3 MG/DL (ref 2.5–4.5)
PLATELET # BLD AUTO: 235 10E3/UL (ref 150–450)
POTASSIUM BLD-SCNC: 4.2 MMOL/L (ref 3.4–5.3)
RBC # BLD AUTO: 3.71 10E6/UL (ref 4.4–5.9)
SODIUM SERPL-SCNC: 140 MMOL/L (ref 133–144)
WBC # BLD AUTO: 9.3 10E3/UL (ref 4–11)

## 2022-06-14 PROCEDURE — 250N000013 HC RX MED GY IP 250 OP 250 PS 637

## 2022-06-14 PROCEDURE — 250N000011 HC RX IP 250 OP 636: Performed by: STUDENT IN AN ORGANIZED HEALTH CARE EDUCATION/TRAINING PROGRAM

## 2022-06-14 PROCEDURE — 250N000011 HC RX IP 250 OP 636

## 2022-06-14 PROCEDURE — 84100 ASSAY OF PHOSPHORUS: CPT | Performed by: STUDENT IN AN ORGANIZED HEALTH CARE EDUCATION/TRAINING PROGRAM

## 2022-06-14 PROCEDURE — 97530 THERAPEUTIC ACTIVITIES: CPT | Mod: GP

## 2022-06-14 PROCEDURE — 82310 ASSAY OF CALCIUM: CPT

## 2022-06-14 PROCEDURE — 85014 HEMATOCRIT: CPT

## 2022-06-14 PROCEDURE — 120N000003 HC R&B IMCU UMMC

## 2022-06-14 PROCEDURE — 97116 GAIT TRAINING THERAPY: CPT | Mod: GP

## 2022-06-14 PROCEDURE — 250N000013 HC RX MED GY IP 250 OP 250 PS 637: Performed by: STUDENT IN AN ORGANIZED HEALTH CARE EDUCATION/TRAINING PROGRAM

## 2022-06-14 PROCEDURE — 36415 COLL VENOUS BLD VENIPUNCTURE: CPT

## 2022-06-14 PROCEDURE — 83735 ASSAY OF MAGNESIUM: CPT | Performed by: STUDENT IN AN ORGANIZED HEALTH CARE EDUCATION/TRAINING PROGRAM

## 2022-06-14 RX ORDER — HYDRALAZINE HYDROCHLORIDE 20 MG/ML
10 INJECTION INTRAMUSCULAR; INTRAVENOUS EVERY 6 HOURS PRN
Status: DISCONTINUED | OUTPATIENT
Start: 2022-06-14 | End: 2022-06-17 | Stop reason: HOSPADM

## 2022-06-14 RX ORDER — BISACODYL 10 MG
10 SUPPOSITORY, RECTAL RECTAL DAILY PRN
Status: DISCONTINUED | OUTPATIENT
Start: 2022-06-14 | End: 2022-06-17 | Stop reason: HOSPADM

## 2022-06-14 RX ORDER — NALOXONE HYDROCHLORIDE 0.4 MG/ML
0.4 INJECTION, SOLUTION INTRAMUSCULAR; INTRAVENOUS; SUBCUTANEOUS
Status: DISCONTINUED | OUTPATIENT
Start: 2022-06-14 | End: 2022-06-17 | Stop reason: HOSPADM

## 2022-06-14 RX ORDER — AMOXICILLIN 250 MG
1 CAPSULE ORAL 2 TIMES DAILY
Status: DISCONTINUED | OUTPATIENT
Start: 2022-06-14 | End: 2022-06-15

## 2022-06-14 RX ORDER — NALOXONE HYDROCHLORIDE 0.4 MG/ML
0.2 INJECTION, SOLUTION INTRAMUSCULAR; INTRAVENOUS; SUBCUTANEOUS
Status: DISCONTINUED | OUTPATIENT
Start: 2022-06-14 | End: 2022-06-17 | Stop reason: HOSPADM

## 2022-06-14 RX ORDER — LABETALOL HYDROCHLORIDE 5 MG/ML
20 INJECTION, SOLUTION INTRAVENOUS EVERY 4 HOURS PRN
Status: DISCONTINUED | OUTPATIENT
Start: 2022-06-14 | End: 2022-06-17 | Stop reason: HOSPADM

## 2022-06-14 RX ORDER — METHOCARBAMOL 750 MG/1
750 TABLET, FILM COATED ORAL 4 TIMES DAILY
Status: DISCONTINUED | OUTPATIENT
Start: 2022-06-14 | End: 2022-06-17 | Stop reason: HOSPADM

## 2022-06-14 RX ORDER — CEFAZOLIN SODIUM 2 G/100ML
2 INJECTION, SOLUTION INTRAVENOUS EVERY 8 HOURS
Status: COMPLETED | OUTPATIENT
Start: 2022-06-14 | End: 2022-06-14

## 2022-06-14 RX ORDER — POLYETHYLENE GLYCOL 3350 17 G/17G
17 POWDER, FOR SOLUTION ORAL 2 TIMES DAILY
Status: DISCONTINUED | OUTPATIENT
Start: 2022-06-14 | End: 2022-06-15

## 2022-06-14 RX ADMIN — FLUTICASONE FUROATE 1 PUFF: 100 POWDER RESPIRATORY (INHALATION) at 01:17

## 2022-06-14 RX ADMIN — HYDROMORPHONE HYDROCHLORIDE 0.4 MG: 0.2 INJECTION, SOLUTION INTRAMUSCULAR; INTRAVENOUS; SUBCUTANEOUS at 03:50

## 2022-06-14 RX ADMIN — INSULIN ASPART 1 UNITS: 100 INJECTION, SOLUTION INTRAVENOUS; SUBCUTANEOUS at 12:23

## 2022-06-14 RX ADMIN — POLYETHYLENE GLYCOL 3350 17 G: 17 POWDER, FOR SOLUTION ORAL at 09:37

## 2022-06-14 RX ADMIN — METHOCARBAMOL 750 MG: 750 TABLET ORAL at 12:25

## 2022-06-14 RX ADMIN — POLYETHYLENE GLYCOL 3350 17 G: 17 POWDER, FOR SOLUTION ORAL at 20:15

## 2022-06-14 RX ADMIN — CEFAZOLIN SODIUM 2 G: 2 INJECTION, SOLUTION INTRAVENOUS at 09:36

## 2022-06-14 RX ADMIN — INSULIN ASPART 1 UNITS: 100 INJECTION, SOLUTION INTRAVENOUS; SUBCUTANEOUS at 01:35

## 2022-06-14 RX ADMIN — CEFAZOLIN SODIUM 2 G: 2 INJECTION, SOLUTION INTRAVENOUS at 02:53

## 2022-06-14 RX ADMIN — LISINOPRIL 20 MG: 20 TABLET ORAL at 09:37

## 2022-06-14 RX ADMIN — HYDROMORPHONE HYDROCHLORIDE 0.4 MG: 0.2 INJECTION, SOLUTION INTRAMUSCULAR; INTRAVENOUS; SUBCUTANEOUS at 00:26

## 2022-06-14 RX ADMIN — ACETAMINOPHEN 975 MG: 325 TABLET ORAL at 12:24

## 2022-06-14 RX ADMIN — HYDROMORPHONE HYDROCHLORIDE 0.4 MG: 0.2 INJECTION, SOLUTION INTRAMUSCULAR; INTRAVENOUS; SUBCUTANEOUS at 07:33

## 2022-06-14 RX ADMIN — INSULIN ASPART 1 UNITS: 100 INJECTION, SOLUTION INTRAVENOUS; SUBCUTANEOUS at 05:42

## 2022-06-14 RX ADMIN — INSULIN ASPART 1 UNITS: 100 INJECTION, SOLUTION INTRAVENOUS; SUBCUTANEOUS at 20:24

## 2022-06-14 RX ADMIN — INSULIN ASPART 2 UNITS: 100 INJECTION, SOLUTION INTRAVENOUS; SUBCUTANEOUS at 16:17

## 2022-06-14 RX ADMIN — METHOCARBAMOL 750 MG: 750 TABLET ORAL at 02:34

## 2022-06-14 RX ADMIN — ACETAMINOPHEN 975 MG: 325 TABLET ORAL at 20:15

## 2022-06-14 RX ADMIN — METHOCARBAMOL 750 MG: 750 TABLET ORAL at 20:15

## 2022-06-14 RX ADMIN — MAGNESIUM HYDROXIDE 30 ML: 400 SUSPENSION ORAL at 13:10

## 2022-06-14 RX ADMIN — GABAPENTIN 300 MG: 300 CAPSULE ORAL at 13:48

## 2022-06-14 RX ADMIN — ACETAMINOPHEN 975 MG: 325 TABLET ORAL at 05:33

## 2022-06-14 RX ADMIN — METHOCARBAMOL 750 MG: 750 TABLET ORAL at 09:37

## 2022-06-14 RX ADMIN — GABAPENTIN 300 MG: 300 CAPSULE ORAL at 09:37

## 2022-06-14 RX ADMIN — GABAPENTIN 300 MG: 300 CAPSULE ORAL at 20:15

## 2022-06-14 RX ADMIN — FAMOTIDINE 10 MG: 10 TABLET, FILM COATED ORAL at 09:37

## 2022-06-14 RX ADMIN — SENNOSIDES AND DOCUSATE SODIUM 1 TABLET: 8.6; 5 TABLET ORAL at 20:15

## 2022-06-14 RX ADMIN — METHOCARBAMOL 750 MG: 750 TABLET ORAL at 16:17

## 2022-06-14 RX ADMIN — FLUTICASONE FUROATE 1 PUFF: 100 POWDER RESPIRATORY (INHALATION) at 09:36

## 2022-06-14 RX ADMIN — FLUTICASONE PROPIONATE 2 SPRAY: 50 SPRAY, METERED NASAL at 09:39

## 2022-06-14 RX ADMIN — OXYCODONE HYDROCHLORIDE 10 MG: 10 TABLET ORAL at 01:43

## 2022-06-14 RX ADMIN — FAMOTIDINE 10 MG: 10 TABLET, FILM COATED ORAL at 20:15

## 2022-06-14 RX ADMIN — OXYCODONE HYDROCHLORIDE 10 MG: 10 TABLET ORAL at 09:49

## 2022-06-14 RX ADMIN — SENNOSIDES AND DOCUSATE SODIUM 1 TABLET: 8.6; 5 TABLET ORAL at 09:37

## 2022-06-14 RX ADMIN — OXYCODONE HYDROCHLORIDE 10 MG: 10 TABLET ORAL at 05:40

## 2022-06-14 ASSESSMENT — ACTIVITIES OF DAILY LIVING (ADL)
ADLS_ACUITY_SCORE: 27
ADLS_ACUITY_SCORE: 27
ADLS_ACUITY_SCORE: 28
ADLS_ACUITY_SCORE: 28
ADLS_ACUITY_SCORE: 27
ADLS_ACUITY_SCORE: 28
ADLS_ACUITY_SCORE: 27
ADLS_ACUITY_SCORE: 28
ADLS_ACUITY_SCORE: 27
ADLS_ACUITY_SCORE: 27

## 2022-06-14 NOTE — PROGRESS NOTES
Brief trauma sign off note  Isolated T 12 burst fracture sustained as a result from a mechanical fall backwards. S/P T10 to 2 posterior instrumentation and fusion with a T12 laminectomy 06/13 Dr. Akins.    His hospital stay was complicated by an adynamic ileus, managed with a nasogastric tube for gastric decompression, bowel regimen optimization and electrolyte optimization. Had multiple bowel movements and is passing flatus. His physical exam and imaging showed some improvement, prior to NG tube removal 06/13. On a clear liquid diet.    - Doing fairly well post op, pain is well controlled, stable  - Neurosurgery to assume primary management post op.    Ronnie Rodrigues Trauma NP   Please page or call trauma with questions  Job code [id number]

## 2022-06-14 NOTE — ANESTHESIA CARE TRANSFER NOTE
Patient: Kalen Callejas    Procedure: Procedure(s):  THORACIC 10 LUMBAR 2, POSTERIOR INSTRUMENTED FUSION, THORACIC 12 LAMINECTOMY, POSS PERCUTANEOUS SCREWS, POSS ADDITIONAL LEVELS WITH O-arm/Stealth GUIDANCE       Diagnosis: Burst fracture of T12 vertebra (H) [S22.081A]  Diagnosis Additional Information: No value filed.    Anesthesia Type:   General     Note:    Oropharynx: oropharynx clear of all foreign objects  Level of Consciousness: awake  Oxygen Supplementation: face mask    Independent Airway: airway patency satisfactory and stable  Dentition: dentition unchanged  Vital Signs Stable: post-procedure vital signs reviewed and stable  Report to RN Given: handoff report given  Patient transferred to: PACU  Comments: VSS. Patient denies n/v, pain. All questions answered to RN.   Handoff Report: Identifed the Patient, Identified the Reponsible Provider, Reviewed the pertinent medical history, Discussed the surgical course, Reviewed Intra-OP anesthesia mangement and issues during anesthesia, Set expectations for post-procedure period and Allowed opportunity for questions and acknowledgement of understanding      Vitals:  Vitals Value Taken Time   /77 06/13/22 1930   Temp     Pulse 117 06/13/22 1935   Resp 17 06/13/22 1935   SpO2 98 % 06/13/22 1935   Vitals shown include unvalidated device data.    Electronically Signed By: Hans Gonsales MD  June 13, 2022  7:36 PM

## 2022-06-14 NOTE — PLAN OF CARE
Neuro: A&Ox3.-see flowsheet for neuro assessment.   Cardiac: ST . VSS.   Respiratory: Sating >92% on RA.  GI/: Due to void. No BM this shift.   Diet/appetite: Tolerating Regulardiet. Appetite poor.  Activity:  Assist of 2 with gait belt and walker, up to chair and in halls.  Pain: At acceptable level on current regimen. PRN Oxycodone X1. PRN IV dilaudid X1.   Skin: No new deficits noted.-see flowsheet.   LDA's: R PIV-SL. L PIV-SL. Hemovac low back.     Plan: Pt has not voided since 6am, Bladder scan 132ml at 1400. MD notified of these findings at 1445 with no new orders. Per MD ok to do spine xray 6/15 if pt unable to tolerate. Encouraged activity and PO intake.  Notified primary team with changes.

## 2022-06-14 NOTE — PLAN OF CARE
Goal Outcome Evaluation:    Plan of Care Reviewed With: patient     Vitals: Afebrile, VSS  Neuros: AOX4 ,intermittently forgetful.  Cardiac:ST HR low 100's  Respiratory:On RA sating above 92%  IV: L piv saline locked.R piv saline locked  Diet: Regular diet.Poor appetite.   GI: No BM.Denies nausea.   : uses the urinal.had 100 ml tea colored output the whole shift. Bladder scanned 5mls. Pt encouraged to drink fluids.  Skin: No new deficit  see flow sheet.  LDA's: Hemovac to lower back, draining serosanguineous .  Pain: Lower back pain controlled with current pain meds per pateint  Activity: AX2 with a walker. Pt to wear brace when ambulating.   Plan: Continue with poc and report any changes to provider.

## 2022-06-14 NOTE — PLAN OF CARE
Goal Outcome Evaluation:    Neuro: A/Ox4, able to make needs known. Q4h neuros- no deficits noted. PERRLA.   Cardiac: SR/ST with HR 90-100s. VSS. Denies CP.   Respiratory: O2 sats stable on 4L NC, sating > 98% while awake, occasional desat to 80s while sleeping, but recovers quickly.   GI/: Schmitt removed @ 0245- voided this AM. No BM, bowel sounds present.  Diet/appetite: Tolerating clear liquids without issue, ADAT.   Activity: Not oob since OR- able to turn mostly on own in bed with some assistance. Brace to be on at all times when OOB.  Pain: Consistent pain rating > 8 residing in back. Prn oxy/dilaudid as well as scheduled tylenol & robaxin.  Skin: Surgical site with frequent bleeding through primapore, primary team up to see pt x 2, stitched hemovac this AM to stop leakage. Dressing redone per team as well.   LDA's: PIV x 2, saline locked/TKO. Hemovac with moderate output.     Plan: Continue with POC. Monitor spinal incision for drainage/continue working on pain regimen. Encourage OOB today. Notify primary team with changes.

## 2022-06-14 NOTE — PROGRESS NOTES
[facility], [place]     Neurosurgery Progress Note:  06/14/2022      Interval History:  No acute events overnight. OR yesterday, pain is moderately controled. Drain not fully holding suction.     Assessment:  62-year-old male with T12 burst fracture.  Intact on exam.  Burst component appears to involve greater than 50% of spinal canal.  Will need MRI to better visualize degree of compression however will likely require decompression and fusion in this admission given the unstable nature.  He was fit with TLSO and underwent upright XR, demonstrating stable spinal alignment while upright. He went to the OR on 6/13 for T10-L2 posterior fusion with T12 laminectomy.     Clinically Significant Risk Factors Present on Admission                 Plan:  Regular diet  Pain control  Bowel regimen  Hemovac to full suction  Upright XR with brace in place  TLSO when OOB  PT/OT  SCDs for  DVT prophylaxis      -----------------------------------  Fatimah Hurst MD  Neurosurgery PGY2    Please contact neurosurgery resident on call with questions.    Dial * * *[phone], enter [phone] when prompted.  -----------------------------------      General: Nonacute distress, appearing stated age, slightly anxious regarding this fracture and transfer  HEENT: Atraumatic, normocephalic  PULM: Breathing comfortably on room air  MSK: Midline and paraspinal thoracic lower thoracic/upper lumbar tenderness   Wound: Dressing in place  NEUROLOGIC:  -- Awake; Alert; oriented x 3  -- Follows commands briskly  -- +repetition, calculation, and naming  -- Speech fluent, spontaneous. No aphasia or dysarthria.  -- no gaze preference. No apparent hemineglect.  Cranial Nerves:  -- visual fields full to confrontation, PERRL 3-2mm bilat and brisk, extraocular movements intact  -- face symmetrical, tongue midline  -- sensory V1-V3 intact bilaterally  -- palate elevates symmetrically, uvula midline  -- hearing grossly intact bilat  --  Trapezii 5/5 strength bilat symmetric  -- Cerebellar: Finger nose finger without dysmetria, intact rapid alternating motions bilaterally     Motor:  Normal bulk / tone; no tremor, rigidity, or bradykinesia.  No muscle wasting or fasciculations  No Pronator Drift       Delt Bi Tri Hand Flexion/  Extension Iliopsoas Quadriceps Hamstrings Tibialis Anterior Gastroc     C5 C6 C7 C8/T1 L2 L3 L4-S1 L4 S1   R 5 5 5 5 5 5 5 5 5   L 5 5 5 5 5 5 5 5 5      Sensory:  intact to LT x 4 extremities        Reflexes: no clonus       Bi Tri BR Jarad Pat Ach Bab     C5-6 C7-8 C6 UMN L2-4 S1 UMN   R 2+ 2+ 2+ Norm 2+ 2+ Norm   L 2+ 2+ 2+ Norm 2+ 2+ Norm      Gait: deferred     Objective:   Temp:  [97.5  F (36.4  C)-98.7  F (37.1  C)] 97.5  F (36.4  C)  Pulse:  [] 102  Resp:  [9-24] 14  BP: (109-158)/() 120/84  MAP:  [74 mmHg] 74 mmHg  Arterial Line BP: (86)/(63) 86/63  SpO2:  [92 %-100 %] 94 %  I/O last 3 completed shifts:  In: 3173.33 [I.V.:2873.33; IV Piggyback:50]  Out: 950 [Urine:680; Emesis/NG output:100; Drains:70; Blood:100]        LABS:  Recent Labs   Lab 06/13/22  2118 06/13/22  1125 06/13/22  0723 06/12/22  0554 06/11/22  0821 06/10/22  1250 06/10/22  0524   NA  --   --  139 138  --   --  142   POTASSIUM  --   --  3.6 4.3 4.4   < > 3.4   CHLORIDE  --   --  106 107  --   --  106   CO2  --   --  26 25  --   --  24   ANIONGAP  --   --  7 6  --   --  12   * 165* 203* 214*  --    < > 145*   BUN  --   --  11 19  --   --  14   CR  --   --  0.66 0.76  --   --  0.57*   MARGE  --   --  8.8 9.4  --   --  8.9    < > = values in this interval not displayed.       Recent Labs   Lab 06/13/22  0930 06/13/22  0543   WBC  --  9.0   RBC  --  3.70*   HGB 14.7 12.7*   HCT  --  38.9*   MCV  --  105*   MCH  --  34.3*   MCHC  --  32.6   RDW  --  11.9   PLT  --  200       IMAGING:  Recent Results (from the past 24 hour(s))   XR Abdomen Port 1 View    Narrative    Exam: XR ABDOMEN PORT 1 VIEWS, 6/13/2022 9:28 AM    Indication: Follow up  ileus    Comparison: Abdomen 6/12/2022    Findings:   Portable AP supine abdominal radiographs. Gastric tube tip and  sidehole overlying the proximal stomach, unchanged from prior study.  Gas-filled loops of large and small bowel throughout the abdomen  demonstrated. Transverse colon has decreased in caliber, now 5.6 cm.  Small bowel loops measure up to 3.7 cm. Degenerative changes. The  known T12 fracture is not well-visualized on the radiograph although  there does appear to be some loss of height along the superior  endplate. Patient also had a right 11th rib fracture on CT.  Calcification overlying the pubic symphysis stable, present within the  prostate on CT 6/9/2022.      Impression    Impression: Ongoing gaseous distention of bowel with slightly  decreased caliber of the transverse colon.    KATELYN BARNES MD         SYSTEM ID:  [id number]   XR Surgery JAVIER Fluoro L/T 5 Min w Stills    Narrative    This exam was marked as non-reportable because it will not be read by a   radiologist or a [place] non-radiologist provider.

## 2022-06-14 NOTE — PROGRESS NOTES
Pt returned from pacu at around 8:30pm on a bed. Two nurses skin check done by myself and Batsheva TAO Started treatment according to poc.

## 2022-06-14 NOTE — BRIEF OP NOTE
[facility]    Brief Operative Note    Pre-operative diagnosis: Burst fracture of T12 vertebra (H) [S22.081A]  Post-operative diagnosis Same as pre-operative diagnosis    Procedure: Procedure(s):  THORACIC 10 LUMBAR 2, POSTERIOR INSTRUMENTED FUSION, THORACIC 12 LAMINECTOMY, POSS PERCUTANEOUS SCREWS, POSS ADDITIONAL LEVELS WITH O-arm/Stealth GUIDANCE  Surgeon: Surgeon(s) and Role:     * Sulaiman Akins MD - Primary     * Altagracia Vasquez MD - Resident - Assisting     * Nyasia Mcocy MD - Resident - Assisting  Anesthesia: General   Estimated Blood Loss: 100 mL    Drains: Hemovac  Specimens: * No specimens in log *  Findings:   None.  Complications: None.  Implants:   Implant Name Type Inv. Item Serial No.  Lot No. LRB No. Used Action   GRAFT BONE MAGNIFUSE [id number] - [id number] Bone/Tissue/Biologic GRAFT BONE MAGNIFUSE [id number] [employer]  N/A 1 Implanted   IMP SCR SET MEDT SOLERA BREAK OFF 5.5MM TI [id number] - [id number] Metallic Hardware/[place] IMP SCR SET MEDT SOLERA BREAK OFF 5.5MM TI [id number]  [employer] NA N/A 10 Implanted   IMP SCR MEDT 5.5/6.0MM SOLERA 6.5X55MM MA [id number] - [id number] Metallic Hardware/[place] IMP SCR MEDT 5.5/6.0MM SOLERA 6.5X55MM MA [id number]  [employer] NA N/A 2 Implanted   IMP SCR MEDT 5.5/6.0MM SOLERA 5.5X50MM MA [id number] - [id number] Metallic Hardware/[place] IMP SCR MEDT 5.5/6.0MM SOLERA 5.5X50MM [place] [id number]  [employer] NA N/A 4 Implanted   IMP SALMA MEDT SOLERA LINED [id number] CHR [id number] - [id number] Metallic Hardware/[place]afety [place] IMP SALMA MEDT SOLERA LINED [id number] CHR [id number]  [employer] NA N/A 1 Implanted     Closure: Nylon sutures    Kamron Jackson MD  Neurosurgery PGY-1

## 2022-06-14 NOTE — PLAN OF CARE
Goal Outcome Evaluation:    Plan of Care Reviewed With: patient  Vitals: Afebrile, VSS  Neuros: AOX4  Cardiac:ST-SR HR 90's -low 100's  Respiratory:On 4L by nc and sating above 95%  IV: L piv saline locked.R piv infusing NS at 125 ml/hr  Diet: Regular diet.  GI: No BM.Denies nausea.  : Hazel in place. Orders in to discontinue hazel  Skin: Old bruising to sacrum, surgical incision to back.  LDA's: Hemovac to lower back draining bloody.Hazel catheter   Pain: Lower back pain of over 10/10 and had prn oxycodone and stated his pain is currently at 8/10.  Activity: AX2. Pt have not gotten OOB yet.  Plan: Continue with poc and report any changes to provider.

## 2022-06-15 ENCOUNTER — APPOINTMENT (OUTPATIENT)
Dept: GENERAL RADIOLOGY | Facility: CLINIC | Age: 62
End: 2022-06-15
Payer: COMMERCIAL

## 2022-06-15 ENCOUNTER — APPOINTMENT (OUTPATIENT)
Dept: OCCUPATIONAL THERAPY | Facility: CLINIC | Age: 62
End: 2022-06-15
Payer: COMMERCIAL

## 2022-06-15 ENCOUNTER — APPOINTMENT (OUTPATIENT)
Dept: PHYSICAL THERAPY | Facility: CLINIC | Age: 62
End: 2022-06-15
Payer: COMMERCIAL

## 2022-06-15 LAB
ANION GAP SERPL CALCULATED.3IONS-SCNC: 10 MMOL/L (ref 3–14)
BUN SERPL-MCNC: 18 MG/DL (ref 7–30)
CALCIUM SERPL-MCNC: 8.3 MG/DL (ref 8.5–10.1)
CHLORIDE BLD-SCNC: 101 MMOL/L (ref 94–109)
CO2 SERPL-SCNC: 26 MMOL/L (ref 20–32)
CREAT SERPL-MCNC: 0.89 MG/DL (ref 0.66–1.25)
ERYTHROCYTE [DISTWIDTH] IN BLOOD BY AUTOMATED COUNT: 12 % (ref 10–15)
GFR SERPL CREATININE-BSD FRML MDRD: >90 ML/MIN/1.73M2
GLUCOSE BLD-MCNC: 145 MG/DL (ref 70–99)
GLUCOSE BLDC GLUCOMTR-MCNC: 121 MG/DL (ref 70–99)
GLUCOSE BLDC GLUCOMTR-MCNC: 142 MG/DL (ref 70–99)
GLUCOSE BLDC GLUCOMTR-MCNC: 148 MG/DL (ref 70–99)
GLUCOSE BLDC GLUCOMTR-MCNC: 152 MG/DL (ref 70–99)
GLUCOSE BLDC GLUCOMTR-MCNC: 180 MG/DL (ref 70–99)
GLUCOSE BLDC GLUCOMTR-MCNC: 193 MG/DL (ref 70–99)
HCT VFR BLD AUTO: 34.6 % (ref 40–53)
HGB BLD-MCNC: 11.5 G/DL (ref 13.3–17.7)
MAGNESIUM SERPL-MCNC: 2.6 MG/DL (ref 1.6–2.3)
MCH RBC QN AUTO: 34.3 PG (ref 26.5–33)
MCHC RBC AUTO-ENTMCNC: 33.2 G/DL (ref 31.5–36.5)
MCV RBC AUTO: 103 FL (ref 78–100)
PHOSPHATE SERPL-MCNC: 3.1 MG/DL (ref 2.5–4.5)
PLATELET # BLD AUTO: 213 10E3/UL (ref 150–450)
POTASSIUM BLD-SCNC: 3.4 MMOL/L (ref 3.4–5.3)
POTASSIUM BLD-SCNC: 3.8 MMOL/L (ref 3.4–5.3)
RBC # BLD AUTO: 3.35 10E6/UL (ref 4.4–5.9)
SODIUM SERPL-SCNC: 137 MMOL/L (ref 133–144)
WBC # BLD AUTO: 10.4 10E3/UL (ref 4–11)

## 2022-06-15 PROCEDURE — 85027 COMPLETE CBC AUTOMATED: CPT

## 2022-06-15 PROCEDURE — 36415 COLL VENOUS BLD VENIPUNCTURE: CPT

## 2022-06-15 PROCEDURE — 36415 COLL VENOUS BLD VENIPUNCTURE: CPT | Performed by: STUDENT IN AN ORGANIZED HEALTH CARE EDUCATION/TRAINING PROGRAM

## 2022-06-15 PROCEDURE — 97530 THERAPEUTIC ACTIVITIES: CPT | Mod: GO

## 2022-06-15 PROCEDURE — 250N000013 HC RX MED GY IP 250 OP 250 PS 637: Performed by: STUDENT IN AN ORGANIZED HEALTH CARE EDUCATION/TRAINING PROGRAM

## 2022-06-15 PROCEDURE — 250N000013 HC RX MED GY IP 250 OP 250 PS 637: Performed by: NURSE PRACTITIONER

## 2022-06-15 PROCEDURE — 97530 THERAPEUTIC ACTIVITIES: CPT | Mod: GP

## 2022-06-15 PROCEDURE — 999N000065 XR SPINE COMPLETE SCOLIOSIS 2 VW

## 2022-06-15 PROCEDURE — 250N000011 HC RX IP 250 OP 636

## 2022-06-15 PROCEDURE — 84100 ASSAY OF PHOSPHORUS: CPT | Performed by: STUDENT IN AN ORGANIZED HEALTH CARE EDUCATION/TRAINING PROGRAM

## 2022-06-15 PROCEDURE — 80048 BASIC METABOLIC PNL TOTAL CA: CPT

## 2022-06-15 PROCEDURE — 97116 GAIT TRAINING THERAPY: CPT | Mod: GP

## 2022-06-15 PROCEDURE — 250N000013 HC RX MED GY IP 250 OP 250 PS 637

## 2022-06-15 PROCEDURE — 72082 X-RAY EXAM ENTIRE SPI 2/3 VW: CPT | Mod: 26 | Performed by: STUDENT IN AN ORGANIZED HEALTH CARE EDUCATION/TRAINING PROGRAM

## 2022-06-15 PROCEDURE — 83735 ASSAY OF MAGNESIUM: CPT | Performed by: STUDENT IN AN ORGANIZED HEALTH CARE EDUCATION/TRAINING PROGRAM

## 2022-06-15 PROCEDURE — 97165 OT EVAL LOW COMPLEX 30 MIN: CPT | Mod: GO

## 2022-06-15 PROCEDURE — 120N000002 HC R&B MED SURG/OB UMMC

## 2022-06-15 PROCEDURE — 84132 ASSAY OF SERUM POTASSIUM: CPT | Performed by: STUDENT IN AN ORGANIZED HEALTH CARE EDUCATION/TRAINING PROGRAM

## 2022-06-15 RX ORDER — AMOXICILLIN 250 MG
3 CAPSULE ORAL 2 TIMES DAILY
Status: DISCONTINUED | OUTPATIENT
Start: 2022-06-15 | End: 2022-06-17 | Stop reason: HOSPADM

## 2022-06-15 RX ORDER — HEPARIN SODIUM 5000 [USP'U]/.5ML
5000 INJECTION, SOLUTION INTRAVENOUS; SUBCUTANEOUS EVERY 8 HOURS
Status: DISCONTINUED | OUTPATIENT
Start: 2022-06-15 | End: 2022-06-17 | Stop reason: HOSPADM

## 2022-06-15 RX ORDER — POLYETHYLENE GLYCOL 3350 17 G/17G
17 POWDER, FOR SOLUTION ORAL 3 TIMES DAILY
Status: DISCONTINUED | OUTPATIENT
Start: 2022-06-15 | End: 2022-06-17 | Stop reason: HOSPADM

## 2022-06-15 RX ORDER — POTASSIUM CHLORIDE 750 MG/1
40 TABLET, EXTENDED RELEASE ORAL ONCE
Status: COMPLETED | OUTPATIENT
Start: 2022-06-15 | End: 2022-06-15

## 2022-06-15 RX ADMIN — FAMOTIDINE 10 MG: 10 TABLET, FILM COATED ORAL at 08:33

## 2022-06-15 RX ADMIN — METHOCARBAMOL 750 MG: 750 TABLET ORAL at 08:33

## 2022-06-15 RX ADMIN — INSULIN ASPART 1 UNITS: 100 INJECTION, SOLUTION INTRAVENOUS; SUBCUTANEOUS at 20:48

## 2022-06-15 RX ADMIN — POTASSIUM CHLORIDE 40 MEQ: 750 TABLET, EXTENDED RELEASE ORAL at 08:32

## 2022-06-15 RX ADMIN — ACETAMINOPHEN 975 MG: 325 TABLET ORAL at 03:30

## 2022-06-15 RX ADMIN — HEPARIN SODIUM 5000 UNITS: 5000 INJECTION, SOLUTION INTRAVENOUS; SUBCUTANEOUS at 19:03

## 2022-06-15 RX ADMIN — FLUTICASONE PROPIONATE 2 SPRAY: 50 SPRAY, METERED NASAL at 08:34

## 2022-06-15 RX ADMIN — METHOCARBAMOL 750 MG: 750 TABLET ORAL at 15:34

## 2022-06-15 RX ADMIN — HYDROMORPHONE HYDROCHLORIDE 0.4 MG: 0.2 INJECTION, SOLUTION INTRAMUSCULAR; INTRAVENOUS; SUBCUTANEOUS at 12:04

## 2022-06-15 RX ADMIN — ACETAMINOPHEN 975 MG: 325 TABLET ORAL at 12:06

## 2022-06-15 RX ADMIN — INSULIN ASPART 2 UNITS: 100 INJECTION, SOLUTION INTRAVENOUS; SUBCUTANEOUS at 17:11

## 2022-06-15 RX ADMIN — LISINOPRIL 20 MG: 20 TABLET ORAL at 08:33

## 2022-06-15 RX ADMIN — MAGNESIUM HYDROXIDE 30 ML: 400 SUSPENSION ORAL at 08:32

## 2022-06-15 RX ADMIN — FAMOTIDINE 10 MG: 10 TABLET, FILM COATED ORAL at 20:36

## 2022-06-15 RX ADMIN — METHOCARBAMOL 750 MG: 750 TABLET ORAL at 12:06

## 2022-06-15 RX ADMIN — GABAPENTIN 300 MG: 300 CAPSULE ORAL at 08:33

## 2022-06-15 RX ADMIN — FLUTICASONE FUROATE 1 PUFF: 100 POWDER RESPIRATORY (INHALATION) at 08:32

## 2022-06-15 RX ADMIN — ACETAMINOPHEN 975 MG: 325 TABLET ORAL at 20:36

## 2022-06-15 RX ADMIN — GABAPENTIN 300 MG: 300 CAPSULE ORAL at 15:33

## 2022-06-15 RX ADMIN — GABAPENTIN 300 MG: 300 CAPSULE ORAL at 20:36

## 2022-06-15 RX ADMIN — INSULIN ASPART 1 UNITS: 100 INJECTION, SOLUTION INTRAVENOUS; SUBCUTANEOUS at 00:03

## 2022-06-15 RX ADMIN — INSULIN ASPART 1 UNITS: 100 INJECTION, SOLUTION INTRAVENOUS; SUBCUTANEOUS at 03:30

## 2022-06-15 RX ADMIN — SENNOSIDES AND DOCUSATE SODIUM 3 TABLET: 8.6; 5 TABLET ORAL at 08:32

## 2022-06-15 RX ADMIN — METHOCARBAMOL 750 MG: 750 TABLET ORAL at 20:36

## 2022-06-15 RX ADMIN — POLYETHYLENE GLYCOL 3350 17 G: 17 POWDER, FOR SOLUTION ORAL at 08:32

## 2022-06-15 RX ADMIN — INSULIN ASPART 1 UNITS: 100 INJECTION, SOLUTION INTRAVENOUS; SUBCUTANEOUS at 12:35

## 2022-06-15 RX ADMIN — POLYETHYLENE GLYCOL 3350 17 G: 17 POWDER, FOR SOLUTION ORAL at 15:34

## 2022-06-15 ASSESSMENT — ACTIVITIES OF DAILY LIVING (ADL)
ADLS_ACUITY_SCORE: 32
ADLS_ACUITY_SCORE: 28
ADLS_ACUITY_SCORE: 28
PREVIOUS_RESPONSIBILITIES: MEAL PREP;HOUSEKEEPING;LAUNDRY;SHOPPING;YARDWORK;MEDICATION MANAGEMENT;FINANCES;DRIVING
ADLS_ACUITY_SCORE: 28
IADL_COMMENTS: PT IS RETIRED
ADLS_ACUITY_SCORE: 28
ADLS_ACUITY_SCORE: 28
ADLS_ACUITY_SCORE: 30
ADLS_ACUITY_SCORE: 28
ADLS_ACUITY_SCORE: 32
ADLS_ACUITY_SCORE: 28

## 2022-06-15 NOTE — PLAN OF CARE
/65 (BP Location: Left arm)   Pulse 113   Temp 97.4  F (36.3  C) (Oral)   Resp 18   Wt 133.6 kg (294 lb 9.6 oz)   SpO2 96%   BMI 37.32 kg/m      HR remains ST with rates 100's-110's. All other VSS. Afebrile. Room air. Alert and oriented x 3-4. Intermittent forgetfulness of exact date. Neuro's and CMS checks intact. Scheduled Tylenol given for lower back pain. Patient declined use of narcotics. Left lower spinal incision with unchanged and marked dried drainage. Hemovac in place with small output. Repositioning overnight q 2 hours. Up with 2 assist, walker, and gait belt. TLSO brace needed when walking. OK to not use for transfer to chair in room. Unable to void. Bladder scan at 0345 for 355 ml. Straight cath x 1 shortly after for 400 cc of tea colored urine. Patient encouraged to drink more fluids. No BM overnight. Passing gas and positive bowel sounds. Morning bowel meds ordered. Denies n/v. Right arm PIV's saline locked. Continue to monitor.

## 2022-06-15 NOTE — PLAN OF CARE
Goal Outcome Evaluation:    Plan of Care Reviewed With: patient, spouse     Blood pressure 121/69, pulse 110, temperature 98.6  F (37  C), temperature source Oral, resp. rate 22, weight 133.6 kg (294 lb 9.6 oz), SpO2 94 %.  Transfer  Transferred to: 6A  Via: Wheelchair   Reason for transfer:Pt no longer appropriate for 6B- improved patient condition  Family: Aware of transfer  Belongings: Packed and sent with pt  Chart: Delivered with pt to next unit  Medications: Meds sent to new unit with pt  Report given to: 6A R.N.  Pt status:  Neuro: A&Ox4, forgetful  Cardiac: ST. VSS.   Respiratory: Sating >92% on RA.  GI/: Adequate urine output. BM X1- Loose stools, incontinent at times  Diet/appetite: Tolerating regular diet. Poor PO intake.  Activity:  Assist of 1-2, up to chair and in halls. TLSO  on when out in hallway for walk  Pain: At acceptable level on current regimen.   Skin: No new deficits noted.  LDA's: Hemovac, PIV x1    Plan: Continue with POC. Notify primary team with changes.

## 2022-06-15 NOTE — PLAN OF CARE
Status: admitted for T12 burst fracture involving >50% of spinal canal. 6/13 s/p T10-L2 posterior fusion, T12 lami.  Vitals: VSS ex for tachycardia.   Neuros: A&Ox3-4, forgetful. 5/5 BUE. 4/5 BLE. Denies N/T.   IV: PIV SL  Labs/Electrolytes: BG checks. K replaced   Resp/trach: LS diminished.   Diet: Regular  Bowel status: x3 Diarrhea/fecal incontinence. Hold bowel meds.   : Retention. Bladder scan 473mL. Straight carloz'd 600  Skin: See below   Pain: Denies  Activity: A1-2, GB, walker. TLSO when walking in halls.  Plan: Continue to monitor for changes and follow POC.    Arrived from: 6B   Belongings/meds: clothes, phone, shoes  2 RN Skin Assessment Completed by: Melissa Pierce    Non-intact findings documented (yes/no/NA): Back incision, bruising to lower back, scabs on bilateral feet, bruise on R upper thigh, blanchable redness to heels (mepilex applied)

## 2022-06-15 NOTE — PROGRESS NOTES
06/15/22 1415   Quick Adds   Type of Visit Initial Occupational Therapy Evaluation   Living Environment   People in Home spouse   Current Living Arrangements house   Home Accessibility stairs to enter home   Number of Stairs, Main Entrance 2   Stair Railings, Main Entrance railing on right side (ascending)   Transportation Anticipated family or friend will provide;car, drives self   Living Environment Comments Pt lives with spouse in [place], [place] in a rambler w/ 2 JAVIER only.   Self-Care   Usual Activity Tolerance good   Current Activity Tolerance moderate   Regular Exercise Yes   Equipment Currently Used at Home none   Fall history within last six months yes   Number of times patient has fallen within last six months 1   Activity/Exercise/Self-Care Comment Pt reports he was IND with ADLs at baseline and w/ mobility. Pt keeps very active at home   Instrumental Activities of Daily Living (IADL)   Previous Responsibilities meal prep;housekeeping;laundry;shopping;yardwork;medication management;finances;driving   IADL Comments Pt is retired   General Information   Onset of Illness/Injury or Date of Surgery 06/09/22   Referring Physician Kamron Jackson MD   Additional Occupational Profile Info/Pertinent History of Current Problem 62-year-old male with T12 burst fracture.  Intact on exam.  Burst component appears to involve greater than 50% of spinal canal.  Will need MRI to better visualize degree of compression however will likely require decompression and fusion in this admission given the unstable nature.  He was fit with TLSO and underwent upright XR, demonstrating stable spinal alignment while upright. He went to the OR on 6/13 for T10-L2 posterior fusion with T12 laminectomy.   Existing Precautions/Restrictions fall;spinal   Left Upper Extremity (Weight-bearing Status) partial weight-bearing (PWB)   Right Upper Extremity (Weight-bearing Status) partial weight-bearing (PWB)   Cognitive Status Examination    Orientation Status orientation to person, place and time   Affect/Mental Status (Cognitive) WFL   Cognitive Status Comments Will continue to monitor and assess   Visual Perception   Visual Impairment/Limitations WFL   Sensory   Sensory Quick Adds No deficits were identified   Pain Assessment   Patient Currently in Pain No   Integumentary/Edema   Integumentary/Edema no deficits were identifed   Posture   Posture not impaired   Range of Motion Comprehensive   General Range of Motion no range of motion deficits identified   Strength Comprehensive (MMT)   Comment, General Manual Muscle Testing (MMT) Assessment Not formally tested d/t spinal precautions. Observed to be WFL during session   Bed Mobility   Bed Mobility rolling left;rolling right;supine-sit   Rolling Left [place] (Bed Mobility) minimum assist (75% patient effort)   Rolling Right [place] (Bed Mobility) minimum assist (75% patient effort)   Supine-Sit [place] (Bed Mobility) moderate assist (50% patient effort);2 person assist   Assistive Device (Bed Mobility) bed rails   Transfers   Transfers sit-stand transfer   Sit-Stand Transfer   Sit-Stand [place] (Transfers) contact guard;verbal cues;2 person assist   Assistive Device (Sit-Stand Transfers) walker, standard   Activities of Daily Living   BADL Assessment/Intervention toileting;grooming;lower body dressing;bathing   Bathing Assessment/Intervention   [place] Level (Bathing) moderate assist (50% patient effort)   Comment, (Bathing) NT but anticipate mod A per clinical judgement   Lower Body Dressing Assessment/Training   Comment, (Lower Body Dressing) NT but anticipate max A per clinical judgement   [place] Level (Lower Body Dressing) maximum assist (25% patient effort)   Grooming Assessment/Training   [place] Level (Grooming) set up;supervision   Comment, (Grooming) NT but anticipate SBA w/ set up per clinical judgement   Toileting   Comment, (Toileting) NT but anticipate  min A per clinical judgement   [place] Level (Toileting) minimum assist (75% patient effort)   Clinical Impression   Criteria for Skilled Therapeutic Interventions Met (OT) Yes, treatment indicated   OT Diagnosis Decreased I/ADL IND   OT Problem List-Impairments impacting ADL problems related to;activity tolerance impaired;balance;mobility;strength;post-surgical precautions   Assessment of Occupational Performance 5 or more Performance Deficits   Identified Performance Deficits dressing, bathing, toileting, amb, transfers, bed mobility, home mgmt   Planned Therapy Interventions (OT) ADL retraining;IADL retraining;balance training;bed mobility training;strengthening;transfer training;home program guidelines;progressive activity/exercise   Clinical Decision Making Complexity (OT) low complexity   Anticipated Equipment Needs Upon Discharge (OT)   (tbd)   Risk & Benefits of therapy have been explained patient   Clinical Impression Comments pt would benefit from continued skilled OT to progress I/ADL IND and mobility   OT Discharge Planning   OT Discharge Recommendation (DC Rec) Transitional Care Facility;home with assist   OT Rationale for DC Rec Pt presents far below funcitonal mobiltiy at this time and rec TCU currently. Pending LOS and progression w/ IP therapies, pt may progress to d/c home w/ A   OT Brief overview of current status A x 2   Total Evaluation Time (Minutes)   Total Evaluation Time (Minutes) 5   OT Goals   Therapy Frequency (OT) 5 times/wk   OT Predicted Duration/Target Date for Goal Attainment 06/24/22   OT Goals Hygiene/Grooming;Lower Body Dressing;Upper Body Dressing;Lower Body Bathing;Transfers;Toilet Transfer/Toileting   OT: Hygiene/Grooming independent   OT: Upper Body Dressing Independent;within precautions;including set-up/clothing retrieval   OT: Lower Body Dressing Independent;within precautions;including set-up/clothing retrieval   OT: Lower Body Bathing Independent;with precautions    OT: Transfer Independent;within precautions  (tub/shower transfer)   OT: Toilet Transfer/Toileting Independent;within precautions

## 2022-06-15 NOTE — ANESTHESIA POSTPROCEDURE EVALUATION
Patient: Kalen Callejas    Procedure: Procedure(s):  THORACIC 10 LUMBAR 2, POSTERIOR INSTRUMENTED FUSION, THORACIC 12 LAMINECTOMY, POSS PERCUTANEOUS SCREWS, POSS ADDITIONAL LEVELS WITH O-arm/Stealth GUIDANCE       Anesthesia Type:  General    Note:  Disposition: Admission   Postop Pain Control: Uneventful            Sign Out: Well controlled pain   PONV: No   Neuro/Psych: Uneventful            Sign Out: Acceptable/Baseline neuro status   Airway/Respiratory: Uneventful            Sign Out: Acceptable/Baseline resp. status   CV/Hemodynamics: Uneventful            Sign Out: Acceptable CV status   Other NRE: NONE   DID A NON-ROUTINE EVENT OCCUR? No           Last vitals:  Vitals Value Taken Time   /83 06/13/22 2015   Temp     Pulse 112 06/13/22 2023   Resp 11 06/13/22 2023   SpO2 96 % 06/13/22 2024   Vitals shown include unvalidated device data.    Electronically Signed By: Christopher J. Behrens, MD  [place] 15, 2022  9:10 AM

## 2022-06-15 NOTE — PROGRESS NOTES
[facility], [place]     Neurosurgery Progress Note:  06/15/2022      Interval History:  No acute events overnight. Passing gas, pain well controlled.     Assessment:  62-year-old male with T12 burst fracture.  Intact on exam.  Burst component appears to involve greater than 50% of spinal canal.  Will need MRI to better visualize degree of compression however will likely require decompression and fusion in this admission given the unstable nature.  He was fit with TLSO and underwent upright XR, demonstrating stable spinal alignment while upright. He went to the OR on 6/13 for T10-L2 posterior fusion with T12 laminectomy.     Clinically Significant Risk Factors Present on Admission                 Plan:  Regular diet  Pain control  Bowel regimen  Hemovac to full suction  Upright XR with brace in place  TLSO when OOB  PT/OT  SCDs for  DVT prophylaxis      -----------------------------------  Fatimah Hurst MD  Neurosurgery PGY2    Please contact neurosurgery resident on call with questions.    Dial * * [phone], enter [phone] when prompted.  -----------------------------------      General: Nonacute distress, appearing stated age  HEENT: Atraumatic, normocephalic  PULM: Breathing comfortably on room air  MSK: Midline and paraspinal thoracic lower thoracic/upper lumbar tenderness   Wound: Dressing in place  NEUROLOGIC:  -- Awake; Alert; oriented x 3  -- Follows commands briskly  -- +repetition, calculation, and naming  -- Speech fluent, spontaneous. No aphasia or dysarthria.  -- no gaze preference. No apparent hemineglect.  Cranial Nerves:  -- visual fields full to confrontation, PERRL 3-2mm bilat and brisk, extraocular movements intact  -- face symmetrical, tongue midline  -- sensory V1-V3 intact bilaterally  -- palate elevates symmetrically, uvula midline  -- hearing grossly intact bilat  -- Trapezii 5/5 strength bilat symmetric  -- Cerebellar: Finger nose finger without dysmetria, intact  rapid alternating motions bilaterally     Motor:  Normal bulk / tone; no tremor, rigidity, or bradykinesia.  No muscle wasting or fasciculations  No Pronator Drift       Delt Bi Tri Hand Flexion/  Extension Iliopsoas Quadriceps Hamstrings Tibialis Anterior Gastroc     C5 C6 C7 C8/T1 L2 L3 L4-S1 L4 S1   R 5 5 5 5 5 5 5 5 5   L 5 5 5 5 5 5 5 5 5      Sensory:  intact to LT x 4 extremities        Reflexes: no clonus       Bi Tri BR Jarad Pat Ach Bab     C5-6 C7-8 C6 UMN L2-4 S1 UMN   R 2+ 2+ 2+ Norm 2+ 2+ Norm   L 2+ 2+ 2+ Norm 2+ 2+ Norm      Gait: deferred     Objective:   Temp:  [97.4  F (36.3  C)-100.9  F (38.3  C)] 97.4  F (36.3  C)  Pulse:  [106-120] 113  Resp:  [10-24] 18  BP: ()/(64-82) 100/65  SpO2:  [94 %-100 %] 96 %  I/O last 3 completed shifts:  In: 950 [P.O.:850; I.V.:100]  Out: 840 [Urine:545; Drains:295]        LABS:  Recent Labs   Lab 06/15/22  0328 06/15/22  0003 06/14/22 2023 06/14/22  0757 06/14/22  0543 06/13/22  1125 06/13/22  0723 06/12/22  0554   NA  --   --   --   --  140  --  139 138   POTASSIUM  --   --   --   --  4.2  --  3.6 4.3   CHLORIDE  --   --   --   --  106  --  106 107   CO2  --   --   --   --  27  --  26 25   ANIONGAP  --   --   --   --  7  --  7 6   * 148* 140*   < > 157*   < > 203* 214*   BUN  --   --   --   --  12  --  11 19   CR  --   --   --   --  0.74  --  0.66 0.76   MARGE  --   --   --   --  8.2*  --  8.8 9.4    < > = values in this interval not displayed.       Recent Labs   Lab 06/14/22  0543   WBC 9.3   RBC 3.71*   HGB 12.8*   HCT 38.2*   *   MCH 34.5*   MCHC 33.5   RDW 11.9          IMAGING:  No results found for this or any previous visit (from the past 24 hour(s)).

## 2022-06-16 ENCOUNTER — APPOINTMENT (OUTPATIENT)
Dept: PHYSICAL THERAPY | Facility: CLINIC | Age: 62
End: 2022-06-16
Payer: COMMERCIAL

## 2022-06-16 LAB
ANION GAP SERPL CALCULATED.3IONS-SCNC: 8 MMOL/L (ref 3–14)
BUN SERPL-MCNC: 16 MG/DL (ref 7–30)
CALCIUM SERPL-MCNC: 8.6 MG/DL (ref 8.5–10.1)
CHLORIDE BLD-SCNC: 103 MMOL/L (ref 94–109)
CO2 SERPL-SCNC: 26 MMOL/L (ref 20–32)
CREAT SERPL-MCNC: 0.68 MG/DL (ref 0.66–1.25)
ERYTHROCYTE [DISTWIDTH] IN BLOOD BY AUTOMATED COUNT: 11.9 % (ref 10–15)
GFR SERPL CREATININE-BSD FRML MDRD: >90 ML/MIN/1.73M2
GLUCOSE BLD-MCNC: 156 MG/DL (ref 70–99)
GLUCOSE BLDC GLUCOMTR-MCNC: 182 MG/DL (ref 70–99)
GLUCOSE BLDC GLUCOMTR-MCNC: 184 MG/DL (ref 70–99)
GLUCOSE BLDC GLUCOMTR-MCNC: 188 MG/DL (ref 70–99)
GLUCOSE BLDC GLUCOMTR-MCNC: 215 MG/DL (ref 70–99)
GLUCOSE BLDC GLUCOMTR-MCNC: 225 MG/DL (ref 70–99)
HCT VFR BLD AUTO: 35.1 % (ref 40–53)
HGB BLD-MCNC: 11.7 G/DL (ref 13.3–17.7)
MAGNESIUM SERPL-MCNC: 2.5 MG/DL (ref 1.6–2.3)
MCH RBC QN AUTO: 34.5 PG (ref 26.5–33)
MCHC RBC AUTO-ENTMCNC: 33.3 G/DL (ref 31.5–36.5)
MCV RBC AUTO: 104 FL (ref 78–100)
PHOSPHATE SERPL-MCNC: 1.9 MG/DL (ref 2.5–4.5)
PLATELET # BLD AUTO: 249 10E3/UL (ref 150–450)
POTASSIUM BLD-SCNC: 3.4 MMOL/L (ref 3.4–5.3)
POTASSIUM BLD-SCNC: 3.7 MMOL/L (ref 3.4–5.3)
RBC # BLD AUTO: 3.39 10E6/UL (ref 4.4–5.9)
SODIUM SERPL-SCNC: 137 MMOL/L (ref 133–144)
WBC # BLD AUTO: 9.2 10E3/UL (ref 4–11)

## 2022-06-16 PROCEDURE — 84132 ASSAY OF SERUM POTASSIUM: CPT | Performed by: STUDENT IN AN ORGANIZED HEALTH CARE EDUCATION/TRAINING PROGRAM

## 2022-06-16 PROCEDURE — 83735 ASSAY OF MAGNESIUM: CPT | Performed by: STUDENT IN AN ORGANIZED HEALTH CARE EDUCATION/TRAINING PROGRAM

## 2022-06-16 PROCEDURE — 250N000013 HC RX MED GY IP 250 OP 250 PS 637

## 2022-06-16 PROCEDURE — 85027 COMPLETE CBC AUTOMATED: CPT

## 2022-06-16 PROCEDURE — 36415 COLL VENOUS BLD VENIPUNCTURE: CPT | Performed by: STUDENT IN AN ORGANIZED HEALTH CARE EDUCATION/TRAINING PROGRAM

## 2022-06-16 PROCEDURE — 250N000013 HC RX MED GY IP 250 OP 250 PS 637: Performed by: STUDENT IN AN ORGANIZED HEALTH CARE EDUCATION/TRAINING PROGRAM

## 2022-06-16 PROCEDURE — 97116 GAIT TRAINING THERAPY: CPT | Mod: GP

## 2022-06-16 PROCEDURE — 97530 THERAPEUTIC ACTIVITIES: CPT | Mod: GP

## 2022-06-16 PROCEDURE — 82310 ASSAY OF CALCIUM: CPT

## 2022-06-16 PROCEDURE — 120N000002 HC R&B MED SURG/OB UMMC

## 2022-06-16 PROCEDURE — 84100 ASSAY OF PHOSPHORUS: CPT | Performed by: STUDENT IN AN ORGANIZED HEALTH CARE EDUCATION/TRAINING PROGRAM

## 2022-06-16 PROCEDURE — 36415 COLL VENOUS BLD VENIPUNCTURE: CPT

## 2022-06-16 PROCEDURE — 250N000011 HC RX IP 250 OP 636

## 2022-06-16 RX ORDER — POTASSIUM CHLORIDE 750 MG/1
40 TABLET, EXTENDED RELEASE ORAL ONCE
Status: COMPLETED | OUTPATIENT
Start: 2022-06-16 | End: 2022-06-16

## 2022-06-16 RX ORDER — POTASSIUM CHLORIDE 750 MG/1
20 TABLET, EXTENDED RELEASE ORAL ONCE
Status: COMPLETED | OUTPATIENT
Start: 2022-06-16 | End: 2022-06-16

## 2022-06-16 RX ADMIN — GABAPENTIN 300 MG: 300 CAPSULE ORAL at 20:23

## 2022-06-16 RX ADMIN — ACETAMINOPHEN 650 MG: 325 TABLET ORAL at 20:24

## 2022-06-16 RX ADMIN — POTASSIUM CHLORIDE 20 MEQ: 750 TABLET, EXTENDED RELEASE ORAL at 17:05

## 2022-06-16 RX ADMIN — ACETAMINOPHEN 650 MG: 325 TABLET ORAL at 00:47

## 2022-06-16 RX ADMIN — HEPARIN SODIUM 5000 UNITS: 5000 INJECTION, SOLUTION INTRAVENOUS; SUBCUTANEOUS at 04:16

## 2022-06-16 RX ADMIN — POTASSIUM & SODIUM PHOSPHATES POWDER PACK 280-160-250 MG 2 PACKET: 280-160-250 PACK at 13:54

## 2022-06-16 RX ADMIN — LISINOPRIL 20 MG: 20 TABLET ORAL at 07:43

## 2022-06-16 RX ADMIN — FLUTICASONE PROPIONATE 2 SPRAY: 50 SPRAY, METERED NASAL at 07:44

## 2022-06-16 RX ADMIN — HYDROMORPHONE HYDROCHLORIDE 0.2 MG: 0.2 INJECTION, SOLUTION INTRAMUSCULAR; INTRAVENOUS; SUBCUTANEOUS at 08:09

## 2022-06-16 RX ADMIN — HEPARIN SODIUM 5000 UNITS: 5000 INJECTION, SOLUTION INTRAVENOUS; SUBCUTANEOUS at 11:22

## 2022-06-16 RX ADMIN — POTASSIUM & SODIUM PHOSPHATES POWDER PACK 280-160-250 MG 2 PACKET: 280-160-250 PACK at 09:45

## 2022-06-16 RX ADMIN — ACETAMINOPHEN 975 MG: 325 TABLET ORAL at 04:16

## 2022-06-16 RX ADMIN — METHOCARBAMOL 750 MG: 750 TABLET ORAL at 11:22

## 2022-06-16 RX ADMIN — METHOCARBAMOL 750 MG: 750 TABLET ORAL at 07:43

## 2022-06-16 RX ADMIN — POTASSIUM & SODIUM PHOSPHATES POWDER PACK 280-160-250 MG 2 PACKET: 280-160-250 PACK at 18:30

## 2022-06-16 RX ADMIN — POTASSIUM CHLORIDE 40 MEQ: 750 TABLET, EXTENDED RELEASE ORAL at 09:45

## 2022-06-16 RX ADMIN — METHOCARBAMOL 750 MG: 750 TABLET ORAL at 17:05

## 2022-06-16 RX ADMIN — FAMOTIDINE 10 MG: 10 TABLET, FILM COATED ORAL at 07:44

## 2022-06-16 RX ADMIN — OXYCODONE HYDROCHLORIDE 10 MG: 10 TABLET ORAL at 18:43

## 2022-06-16 RX ADMIN — GABAPENTIN 300 MG: 300 CAPSULE ORAL at 13:54

## 2022-06-16 RX ADMIN — ACETAMINOPHEN 975 MG: 325 TABLET ORAL at 12:05

## 2022-06-16 RX ADMIN — GABAPENTIN 300 MG: 300 CAPSULE ORAL at 07:43

## 2022-06-16 RX ADMIN — HEPARIN SODIUM 5000 UNITS: 5000 INJECTION, SOLUTION INTRAVENOUS; SUBCUTANEOUS at 18:31

## 2022-06-16 RX ADMIN — FAMOTIDINE 10 MG: 10 TABLET, FILM COATED ORAL at 20:24

## 2022-06-16 RX ADMIN — INSULIN ASPART 2 UNITS: 100 INJECTION, SOLUTION INTRAVENOUS; SUBCUTANEOUS at 00:45

## 2022-06-16 RX ADMIN — METHOCARBAMOL 750 MG: 750 TABLET ORAL at 20:24

## 2022-06-16 RX ADMIN — FLUTICASONE FUROATE 1 PUFF: 100 POWDER RESPIRATORY (INHALATION) at 09:45

## 2022-06-16 ASSESSMENT — ACTIVITIES OF DAILY LIVING (ADL)
ADLS_ACUITY_SCORE: 32
ADLS_ACUITY_SCORE: 30
ADLS_ACUITY_SCORE: 32
ADLS_ACUITY_SCORE: 30

## 2022-06-16 NOTE — PLAN OF CARE
Goal Outcome Evaluation:    Plan of Care Reviewed With: patient     Overall Patient Progress: no change    Reason for admission: T12 burst fracture (H) [S22.131X]    Vitals: /65 (BP Location: Right arm)   Pulse 74   Temp 99.1  F (37.3  C) (Oral)   Resp 18   Wt 133.6 kg (294 lb 9.6 oz)   SpO2 95%   BMI 37.32 kg/m       Activity: A1/2 with walker gb and TLSO brace.    Pain: C/o pain in the lower back   Neuro: Axox4, forgtful at times.   Cardiac: Tachy at baseline.   Respiratory: RA, LS clear, infrequent nonproductive cough.   GI/: No BM overnight. Still unable to void, see below.   Skin/Wounds: Incision posteriorly, Hemovac in place, 20ml output.   Diet: Orders Placed This Encounter      Advance Diet as Tolerated: Regular Diet Adult    Lines: PIV SL.     New changes this shift: Patient unable to void overnight. Bladder Scanned at 0600 for 320ml. According to protocol, the third consecutive straight cath warrants hazel insertion. Pt. Explained the marie to get up and walk, decided on 0800, and then try to void again before needing to place hazel catheter. Neurosurgery called, approved to wait until after walking, and then trying to void again. If unable to void post ambulation, Call neurosurgery before insertion of hazel cath. Will pass on to AM RN.

## 2022-06-16 NOTE — PLAN OF CARE
Status: Pt here with T12 burst fracture. Went to OR 6/13 for T10-L2 posterior fusion with T12 laminectomy  Vitals:  VSS on RA. Tachycardic at baseline   Neuros: A+O x4. Forgetful at times. 5/5 throughout with gen weakness.   IV: PIV SL  Labs/Electrolytes: K and Phos replaced, rechecks in for AM  Resp/trach:  Clear  Diet: Regular  Bowel status: Had loose BMs last night, BM meds held today  : Patient retains at night, but voided this AM and PVR was 100, no straight cath needed   Skin: Back incision with sutures. Hemovac removed today  Pain: Patient had significant pain this AM, required IV dilaudid x1. Otherwise stable on oral pain meds   Activity: A1 GB and walker. TLSO on when walking in halls. Per order, does not nee when sitting in chair or when going to bathroom  Plan: TCU at discharge, continue with POC

## 2022-06-16 NOTE — PROGRESS NOTES
Surgical hemovac removed at bedside.  Catheter tip intact upon removal.  Suction discontinued prior to removal.  The patient tolerated procedure well.  Will continue to monitor patient closely.      Erika Leal CNP  Department of Neurosurgery  Pager: [phone]

## 2022-06-16 NOTE — OP NOTE
Neurosurgery Operative Report     Procedure Date: 06/13/22    PREOPERATIVE DIAGNOSIS:  1. T12 Burst Fracture      POSTOPERATIVE DIAGNOSIS:  1. Same as above     PROCEDURES PERFORMED:  1. T10-L2 posterior instrumented fusion with use of stealth navigation  2. Posterior thoracolumbar arthrodesis with use of cortical allograft and autograft  3. Open reduction of T12 fracture  4. T12 Laminectomy for stenosis  5. Use of O arm and c-arm fluoroscopy  6. Use of spinal cord monitoring       STAFF SURGEON:          Sulaiman Akins MD.     :  Altagracia Vasquez MD.     ANESTHESIA:  General endotracheal with local.     ESTIMATED BLOOD LOSS:  100 mL.    Implants:           Implant Name Type Inv. Item Serial No.  Lot No. LRB No. Used Action   GRAFT BONE MAGNIFUSE [id number] - [id number] Bone/Tissue/Biologic GRAFT BONE MAGNIFUSE [id number] [employer]   N/A 1 Implanted   IMP SCR SET MEDT SOLERA BREAK OFF 5.5MM TI [id number] - [id number] Metallic Hardware/[place] IMP SCR SET MEDT SOLERA BREAK OFF 5.5MM TI [id number]   [employer] NA N/A 10 Implanted   IMP SCR MEDT 5.5/6.0MM SOLERA 6.5X55MM MA [id number] - [id number] Metallic Hardware/[place] IMP SCR MEDT 5.5/6.0MM SOLERA 6.5X55MM MA [id number]   [employer] NA N/A 2 Implanted   IMP SCR MEDT 5.5/6.0MM SOLERA 5.5X50MM MA [id number] - [id number] Metallic Hardware/[place] IMP SCR MEDT 5.5/6.0MM SOLERA 5.5X50MM MA [id number]   [employer] NA N/A 4 Implanted   IMP SALMA MEDT SOLERA LINED 5.1D729QM CHR [id number] - [id number] Metallic Hardware/[place] IMP SALMA MEDT SOLERA LINED 5.4M253NE CHR [id number]   [employer] NA N/A 1 Implanted      INDICATIONS FOR OPERATION:  Mr. Callejas is a 62 year old male who presented after a fall with a signficant T12 burst fracture with retropulsion into the spinal canal. On exam he was neurologically intact, but given the instability of the fracture and focal kyphosis we elected to proceed with the above named  surgery. Risks, benefits and alternatives were discussed and patient elected to proceed with the above named surgery.     DESCRIPTION OF PROCEDURE:    After consent was obtained, patient was brought to the operating room where general anesthesia was then induced and patient was intubated. A hazel catheter and artery line was placed. Patient was then positioned prone on the Zackery table and all pressure points were appropriately padded. Neuromonitoring electrodes were placed for SSEPs.  Neuromonitoring baselines were found to be stable.  XR was obtained to confirm the appropriate levels. The incision was then marked and the patient was prepped and draped in the usual sterile fashion. Local anesthetic was infiltrated into the proposed incision site.     After timeout, incision was made with a #10 blade. Mono and bipolar cautery were used to establish hemostasis.  Dissection continued down to the fascial layer.  Fascial layer was then dissected.  Spinous processes were identified.  Subperiosteal dissection continued, and then once we had exposed to from T10 down to L2 we placed a spinous process clamp for stealth navigation and used O-arm to perform a spin. We checked the accuracy of the CT scan obtained, and then began to place screws.     We placed pedicle screws from T10 down to L2 bilaterally in the following fashion: first, we drilled a small hole in the cortical bone with a hand-drill with a 5 mm cutting bit. Then we used the navigated awl to create a hole. After feeling for any breaches with the pedicle feeling probe, we tapped under navigation and then placed the screw. We did this in similar fashion for all screws from T10 down to L2 bilaterally. We then performed a check spin with the O-arm which confirmed excellent placement of screws. We then completed a focal laminectomy and decompression at T12. The cord appeared to much more decompressed.      We copiously irrigated the incision and then decorticated  and placed bone allograft and allograft. We then placed rods and set screws, then final tightened these into place. Rods were bent slightly lordotic to reduce the T12 fracture as well.  We placed a hemovac drain into the subfascial space and secured this to the skin with 3-0 nylon suture. The fascial layer was closed tightly utilizing 0 Vicryl sutures in the interrupted manner.  The dermis and subdermal layer were closed utilizing 2-0 Vicryl sutures placed in the inverted interrupted manner, and the skin closed with surgical staples. Wound was cleaned, dried and ChloraPrepped.  Aquacel dressing was applied.  Drapes were taken down.  The patient was returned to our Anesthesia colleagues, where he was positioned in the supine position on the [facility], where he was subsequently successfully extubated.       At the end of the case, all sponge, needle and instrument counts were correct x 2.  All neuromonitoring signals were found to be stable with no changes at the conclusion of the surgical procedure.   I was present during all the critical portions of the procedure.        Altagracia Vasquez MD  Neurosurgery PGY7    I was present for all portions of the procedure except for final closure and immediately available for all portions of the procedure.  Sulaiman Akins MD

## 2022-06-17 ENCOUNTER — APPOINTMENT (OUTPATIENT)
Dept: OCCUPATIONAL THERAPY | Facility: CLINIC | Age: 62
End: 2022-06-17
Payer: COMMERCIAL

## 2022-06-17 ENCOUNTER — APPOINTMENT (OUTPATIENT)
Dept: PHYSICAL THERAPY | Facility: CLINIC | Age: 62
End: 2022-06-17
Payer: COMMERCIAL

## 2022-06-17 VITALS
TEMPERATURE: 98.1 F | BODY MASS INDEX: 37.32 KG/M2 | DIASTOLIC BLOOD PRESSURE: 76 MMHG | SYSTOLIC BLOOD PRESSURE: 115 MMHG | WEIGHT: 294.6 LBS | RESPIRATION RATE: 16 BRPM | HEART RATE: 101 BPM | OXYGEN SATURATION: 99 %

## 2022-06-17 LAB
ANION GAP SERPL CALCULATED.3IONS-SCNC: 5 MMOL/L (ref 3–14)
BUN SERPL-MCNC: 11 MG/DL (ref 7–30)
CALCIUM SERPL-MCNC: 8.5 MG/DL (ref 8.5–10.1)
CHLORIDE BLD-SCNC: 105 MMOL/L (ref 94–109)
CO2 SERPL-SCNC: 27 MMOL/L (ref 20–32)
CREAT SERPL-MCNC: 0.68 MG/DL (ref 0.66–1.25)
ERYTHROCYTE [DISTWIDTH] IN BLOOD BY AUTOMATED COUNT: 11.9 % (ref 10–15)
GFR SERPL CREATININE-BSD FRML MDRD: >90 ML/MIN/1.73M2
GLUCOSE BLD-MCNC: 162 MG/DL (ref 70–99)
HCT VFR BLD AUTO: 37 % (ref 40–53)
HGB BLD-MCNC: 11.8 G/DL (ref 13.3–17.7)
MCH RBC QN AUTO: 33.9 PG (ref 26.5–33)
MCHC RBC AUTO-ENTMCNC: 31.9 G/DL (ref 31.5–36.5)
MCV RBC AUTO: 106 FL (ref 78–100)
PHOSPHATE SERPL-MCNC: 2.2 MG/DL (ref 2.5–4.5)
PLATELET # BLD AUTO: 284 10E3/UL (ref 150–450)
POTASSIUM BLD-SCNC: 4.3 MMOL/L (ref 3.4–5.3)
RBC # BLD AUTO: 3.48 10E6/UL (ref 4.4–5.9)
SODIUM SERPL-SCNC: 137 MMOL/L (ref 133–144)
WBC # BLD AUTO: 9 10E3/UL (ref 4–11)

## 2022-06-17 PROCEDURE — 80048 BASIC METABOLIC PNL TOTAL CA: CPT | Performed by: STUDENT IN AN ORGANIZED HEALTH CARE EDUCATION/TRAINING PROGRAM

## 2022-06-17 PROCEDURE — 250N000011 HC RX IP 250 OP 636

## 2022-06-17 PROCEDURE — 36415 COLL VENOUS BLD VENIPUNCTURE: CPT | Performed by: STUDENT IN AN ORGANIZED HEALTH CARE EDUCATION/TRAINING PROGRAM

## 2022-06-17 PROCEDURE — 84100 ASSAY OF PHOSPHORUS: CPT | Performed by: STUDENT IN AN ORGANIZED HEALTH CARE EDUCATION/TRAINING PROGRAM

## 2022-06-17 PROCEDURE — 97535 SELF CARE MNGMENT TRAINING: CPT | Mod: GO

## 2022-06-17 PROCEDURE — 97530 THERAPEUTIC ACTIVITIES: CPT | Mod: GP | Performed by: REHABILITATION PRACTITIONER

## 2022-06-17 PROCEDURE — 85027 COMPLETE CBC AUTOMATED: CPT | Performed by: STUDENT IN AN ORGANIZED HEALTH CARE EDUCATION/TRAINING PROGRAM

## 2022-06-17 PROCEDURE — 97116 GAIT TRAINING THERAPY: CPT | Mod: GP | Performed by: REHABILITATION PRACTITIONER

## 2022-06-17 PROCEDURE — 250N000013 HC RX MED GY IP 250 OP 250 PS 637

## 2022-06-17 PROCEDURE — 250N000013 HC RX MED GY IP 250 OP 250 PS 637: Performed by: NURSE PRACTITIONER

## 2022-06-17 PROCEDURE — 250N000013 HC RX MED GY IP 250 OP 250 PS 637: Performed by: STUDENT IN AN ORGANIZED HEALTH CARE EDUCATION/TRAINING PROGRAM

## 2022-06-17 RX ORDER — ACETAMINOPHEN 325 MG/1
650 TABLET ORAL EVERY 6 HOURS PRN
Qty: 60 TABLET | Refills: 0 | Status: SHIPPED | OUTPATIENT
Start: 2022-06-17 | End: 2023-11-30

## 2022-06-17 RX ORDER — GABAPENTIN 300 MG/1
300 CAPSULE ORAL 3 TIMES DAILY
Qty: 30 CAPSULE | Refills: 1 | Status: SHIPPED | OUTPATIENT
Start: 2022-06-17 | End: 2022-06-24

## 2022-06-17 RX ORDER — OXYCODONE HYDROCHLORIDE 10 MG/1
10 TABLET ORAL EVERY 8 HOURS PRN
Qty: 20 TABLET | Refills: 0 | Status: SHIPPED | OUTPATIENT
Start: 2022-06-17 | End: 2022-06-22

## 2022-06-17 RX ORDER — METHOCARBAMOL 750 MG/1
750 TABLET, FILM COATED ORAL EVERY 6 HOURS PRN
Qty: 30 TABLET | Refills: 0 | Status: SHIPPED | OUTPATIENT
Start: 2022-06-17 | End: 2022-06-24

## 2022-06-17 RX ADMIN — OXYCODONE HYDROCHLORIDE 10 MG: 10 TABLET ORAL at 12:42

## 2022-06-17 RX ADMIN — FLUTICASONE PROPIONATE 2 SPRAY: 50 SPRAY, METERED NASAL at 08:41

## 2022-06-17 RX ADMIN — FAMOTIDINE 10 MG: 10 TABLET, FILM COATED ORAL at 08:41

## 2022-06-17 RX ADMIN — FLUTICASONE FUROATE 1 PUFF: 100 POWDER RESPIRATORY (INHALATION) at 08:41

## 2022-06-17 RX ADMIN — SENNOSIDES AND DOCUSATE SODIUM 3 TABLET: 8.6; 5 TABLET ORAL at 08:41

## 2022-06-17 RX ADMIN — ACETAMINOPHEN 650 MG: 325 TABLET ORAL at 00:20

## 2022-06-17 RX ADMIN — OXYCODONE HYDROCHLORIDE 10 MG: 10 TABLET ORAL at 08:40

## 2022-06-17 RX ADMIN — GABAPENTIN 300 MG: 300 CAPSULE ORAL at 08:42

## 2022-06-17 RX ADMIN — MAGNESIUM HYDROXIDE 30 ML: 400 SUSPENSION ORAL at 08:41

## 2022-06-17 RX ADMIN — ACETAMINOPHEN 650 MG: 325 TABLET ORAL at 12:44

## 2022-06-17 RX ADMIN — ACETAMINOPHEN 650 MG: 325 TABLET ORAL at 08:40

## 2022-06-17 RX ADMIN — HEPARIN SODIUM 5000 UNITS: 5000 INJECTION, SOLUTION INTRAVENOUS; SUBCUTANEOUS at 01:41

## 2022-06-17 RX ADMIN — METHOCARBAMOL 750 MG: 750 TABLET ORAL at 12:44

## 2022-06-17 RX ADMIN — POLYETHYLENE GLYCOL 3350 17 G: 17 POWDER, FOR SOLUTION ORAL at 08:41

## 2022-06-17 RX ADMIN — METHOCARBAMOL 750 MG: 750 TABLET ORAL at 08:42

## 2022-06-17 RX ADMIN — OXYCODONE HYDROCHLORIDE 10 MG: 10 TABLET ORAL at 00:20

## 2022-06-17 RX ADMIN — LISINOPRIL 20 MG: 20 TABLET ORAL at 08:46

## 2022-06-17 ASSESSMENT — ACTIVITIES OF DAILY LIVING (ADL)
ADLS_ACUITY_SCORE: 30
ADLS_ACUITY_SCORE: 27
ADLS_ACUITY_SCORE: 30

## 2022-06-17 NOTE — PLAN OF CARE
Status: admitted for T12 burst fracture. s/p T10-L2 posterior fusion, T12 lami.  Vitals: VSS ex for tachycardia   Neuros: A&Ox4, forgetful. 5/5 throughout, generalized weakness.   IV: PIV SL  Labs/Electrolytes: K replaced. BG checks  Resp/trach: LSC.  Diet: Regular diet,  Bowel status: x1 loose BM this evening  : Voiding in BR. PVR 0 at 2216. Hx of retaining overnight  Skin: Back incision HALEY, approximated. Old SACHA site covered with a primapore incision.   Pain: Managed with scheduled robaxin and PRN oxycodone and tylenol  Activity: A1, GB, walker. TLSO when ambulating. Walked the halls x1 this evening  Social: Wife updated this evening  Plan: Therapies recommending TCU. Continue to monitor and follow POC

## 2022-06-17 NOTE — PLAN OF CARE
Discharge time/date: today at 1300  Walked or Wheelchair: w/c  PIV removed: yes  Reviewed AVS with patient: yes  Medication due times added to AVS in EPIC: yes  Verbalized understanding of discharge with teachback: yes  Medications retrieved from pharmacy: yes with wife  Supplies sent home: n/a  Belongings from security with patient: n/a

## 2022-06-17 NOTE — PLAN OF CARE
"Discharge Planner PT   Patient plan for discharge: home with assist as needed.   Current status: PT: pt demo amb up to 200'x 1 with WW. pt demo slow but stable pace limited by weakness and pain. no LOB or instability noted, mild trunk flexion with all standing and amb.  PT: pt demo supine to sit needing V.c for log roll tech. pt needing V.c with assist to Don TLSO. pt wife presnt for PT and ed on donning and doffing TLSO. pt needing V.c for spinal precautions with V.c to follow during mobility. ed on home walking program and progress. all other questions answers end of session. pt declined stairs \" I only have two, Ill get up one way or the other\" pt ed with wife present tech for up and down stairs. pt ed on tech for in and out of car.  Barriers to return to prior living situation: pair, weakness, fatigue, and spinal precautions   Recommendations for discharge: per PT. Home with assist as needed.   Rationale for recommendations: pt has progressed well, met most functional PT goals, good support at home.   Pt met 3/4 goals  Pt declined to demo stairs. Due to amb stability pt should not have isssue.        Entered by: Vicente Miller, PTA 06/17/2022 1:14 PM                          "

## 2022-06-17 NOTE — PLAN OF CARE
S/p T10-L2 fusion 6/13. Back incision HALEY approximated WDL. Neuros: intact ex generalized weakness. VSS on RA. PIV SL. Regular diet. Up 1/gb/TLSO. Voids with hesitancy, PVR WNL. Last BM 6/16. Scabbing throughout on feet. Plan discharge TCU vs home.

## 2022-06-17 NOTE — DISCHARGE SUMMARY
[facility] Discharge Summary and Instructions    Kalen Callejas MRN# [id number]   Age: 62 year old YOB: 1960     Date of Admission:  6/9/2022  Date of Discharge::  6/17/2022  Admitting Physician:  Kasi Cruz MD  Discharge Physician:  Kasi Cruz MD          Admission Diagnoses:   T12 burst fracture (H) [S22.081A]          Discharge Diagnosis:     T12 burst fracture (H) [S22.081A]     Clinically Significant Risk Factors Present on Admission               Procedures:   T10-L2 posterior fusion with T12 laminectomy on 6/13/2022           Brief History of Illness:   Mr. Callejas is 62-year-old male with T12 burst fracture.  Intact on exam.  Burst component appears to involve greater than 50% of spinal canal.  Will need MRI to better visualize degree of compression however will likely require decompression and fusion in this admission given the unstable nature.  Patient has elected to undergo above-mentioned procedure.           Hospital Course:   Patient underwent above-mentioned procedure on 6/13/2022. The operation was uncomplicated and he was admitted to the floor. On post operative day 4, he was ambulating, voiding without a hazel, eating a regular diet, pain was well controlled and therefore he was discharged to home.   Plan to wear TLSO brace while walking.  Ok to not wear brace while sitting or lying. He will follow-up in 2 weeks for wound check.        Discharge Medications:     Current Discharge Medication List      START taking these medications    Details   acetaminophen (TYLENOL) 325 MG tablet Take 2 tablets (650 mg) by mouth every 6 hours as needed for other (For optimal non-opioid multimodal pain management to improve pain control.)  Qty: 60 tablet, Refills: 0    Associated Diagnoses: T12 burst fracture (H)      gabapentin (NEURONTIN) 300 MG capsule Take 1 capsule (300 mg) by mouth 3 times daily  Qty: 30 capsule, Refills: 1    Associated Diagnoses: T12 burst fracture  (H)      methocarbamol (ROBAXIN) 750 MG tablet Take 1 tablet (750 mg) by mouth every 6 hours as needed for muscle spasms  Qty: 30 tablet, Refills: 0    Associated Diagnoses: T12 burst fracture (H)      oxyCODONE IR (ROXICODONE) 10 MG tablet Take 1 tablet (10 mg) by mouth every 8 hours as needed for severe pain ((pain rating 7-10))  Qty: 20 tablet, Refills: 0    Associated Diagnoses: T12 burst fracture (H)         CONTINUE these medications which have NOT CHANGED    Details   albuterol (PROAIR HFA/PROVENTIL HFA/VENTOLIN HFA) 108 (90 Base) MCG/ACT inhaler Inhale 2 puffs into the lungs every 4 hours as needed      azelastine (ASTEPRO) 0.15 % nasal spray Spray 1 spray in nostril 2 times daily      budesonide (PULMICORT FLEXHALER) 180 MCG/ACT inhaler Inhale 2 puffs into the lungs 2 times daily      fluticasone (FLONASE) 50 MCG/ACT nasal spray Spray 2 sprays into both nostrils daily      lisinopril (ZESTRIL) 20 MG tablet Take 20 mg by mouth daily                     Discharge Instructions and Follow-Up:     Discharge diet: Regular   Discharge activity: You may advance activity as tolerated. No strenuous exercise or heay lifting greater than 10 lbs for 4 weeks or until seen and cleared in clinic.   Discharge follow-up: Follow-up with Neurosurgery [facility] in 2 weeks for wound check/post-hospital evaluation    Follow-up Dr. Sulaiman Akins MD in 3 months, all additional follow-up visits to be determined by Dr. Sulaiman Akins MD       Wound care:   You underwent surgery on your  thoracic  spine for fusion by Dr. Sulaiman Akins.    - You will have follow in 2 weeks with either our nurse practitioner or your  primary care provider for a wound check, then at 6 weeks and 3 months with your surgeon. You will have follow up Xrays at 6 weeks and at 3 months you will have a CT scan prior to your appointment for the surgeon to review.      - If you have not heard from our clinic about your follow up visit by 3-4 days following your  discharge, please call our clinic at [phone] to schedule an appointment with the Neurosurgery teams.     - Please avoid taking medications like Advil, Motrin, Ibuprofen, Celebrex, Aleve or aspirin for at least 3 months as these medications can slow the healing and fusion of your spine.     After discharge, your activity restrictions are:   -We encourage short frequent walks, increasing as tolerated.  - Avoid housework, vacuuming, laundry, leaf raking, lawn mowing and snow removal.   - No driving until you are seen in clinic and cleared by your neurosurgeon. You should not drive while on narcotic pain medication.   - No strenuous activity.  - No lifting more than 10 pounds until you are seen in clinic (a gallon of milk weighs approximately 8 pounds)  - You are ok to shower, but do not soak your incisions. Pat them dry if they get damp.   - Avoid coloring your hair or permanent styling until cleared by your surgeon  - No baths, hot tubs or pools for 4-6 weeks after surgery.     Wound cares after surgery  - You are ok to shower, but do not soak your incisions. Pat them dry if they get damp.   - Avoid coloring your hair or permanent styling until cleared by your surgeon  - No baths, hot tubs or pools for 4-6 weeks after surgery.     Call if you have any of the followin. Temperature greater than 101.5 F.   2. Any redness, swelling or discharge from the wound.   3. Any new weakness, numbness or altered mental status.  4. Worsening pain that is not improving with the pain medications you were prescribed.     Call [phone] or after 5:00 pm or on weekends call [phone] and ask for the neurosurgery resident on call. Thank You.                  Discharge Disposition:     Discharged to home        REVA Koehler, CNP  Neurosurgery  Pager [phone]

## 2022-06-22 DIAGNOSIS — S22.081A T12 BURST FRACTURE (H): ICD-10-CM

## 2022-06-22 RX ORDER — OXYCODONE HYDROCHLORIDE 10 MG/1
10 TABLET ORAL EVERY 8 HOURS PRN
Qty: 20 TABLET | Refills: 0 | Status: SHIPPED | OUTPATIENT
Start: 2022-06-22 | End: 2022-06-27

## 2022-06-22 NOTE — TELEPHONE ENCOUNTER
Patient requesting a refill of Oxycodone. Patient states has one pill left and will be taking that at noon today; 6-22-22.

## 2022-06-22 NOTE — TELEPHONE ENCOUNTER
Patient calling for a refill of oxycodone.     DOS: 6/13/22  Procedure: T10-L2 posterior fusion with T12 laminectomy  Surgeon: Dr. Tashi Roth Post Op: 1 week 2 days    Current symptom(s): Average 6/10 sometimes 4/10. Middle of lower back. Pain around incision. Patient's wife reports incision looks good. Denies new numbness, tingling, weakness.   Current pain management:   Oxycodone - 1 pill Q8 hours  Methocarbamol - 1 pill Q8 hours  Tylenol - 2 pills Q8 hours  Ice - PRN    Last fill: 6/17/22  Next visit: 6/28/22    Medication pended for your approval, if appropriate. Pharmacy verified.     Any patient questions or concerns: Advised to take tylenol in between oxycodone doses to 'bridge' medications. Encouraged patient to take short frequent walks and increase distance as tolerated.     Informed patient request will be forwarded to care team.

## 2022-06-24 DIAGNOSIS — S22.081A T12 BURST FRACTURE (H): ICD-10-CM

## 2022-06-24 RX ORDER — GABAPENTIN 300 MG/1
300 CAPSULE ORAL 3 TIMES DAILY
Qty: 30 CAPSULE | Refills: 1 | Status: SHIPPED | OUTPATIENT
Start: 2022-06-24 | End: 2022-07-01

## 2022-06-24 RX ORDER — METHOCARBAMOL 750 MG/1
750 TABLET, FILM COATED ORAL EVERY 6 HOURS PRN
Qty: 30 TABLET | Refills: 0 | Status: SHIPPED | OUTPATIENT
Start: 2022-06-24 | End: 2022-07-01

## 2022-06-24 NOTE — TELEPHONE ENCOUNTER
Patient called to request refills for Methocarbamol and Gabapentin. Please call patient back at [phone]. Thank you~

## 2022-06-24 NOTE — TELEPHONE ENCOUNTER
Patient calling for a refill of methocarbamol and gabapentin.     DOS: 6/13/22  Procedure: T10-L2 posterior fusion with T12 laminectomy  Surgeon: Dr. Tashi Roth Post Op: 1 week 4 days    Current symptom(s): 5-6/10 before medication. Medication brings pain down to 3-4/10 manageable. Middle of low back. Soreness. Denies new n/t/w.   Current pain management:   Oxycodone - 1 pill every 8 hours  Methocarbamol - 1 pill every 6 hours  Gabapentin - 1 pill TID  Tylenol - 2 pills approximately BID  Ice - PRN, really works well    Last fill: Gabapentin 6/17/22. Methocarbamol 6/17/22.  Next visit: 6/28/22    Medication pended for your approval, if appropriate. Pharmacy verified.     Any patient questions or concerns:     Informed patient request will be forwarded to care team.

## 2022-06-27 DIAGNOSIS — S22.081A T12 BURST FRACTURE (H): ICD-10-CM

## 2022-06-27 DIAGNOSIS — Z98.890 STATUS POST SPINAL SURGERY: Primary | ICD-10-CM

## 2022-06-27 RX ORDER — OXYCODONE HYDROCHLORIDE 10 MG/1
10 TABLET ORAL EVERY 8 HOURS PRN
Qty: 20 TABLET | Refills: 0 | Status: SHIPPED | OUTPATIENT
Start: 2022-06-27 | End: 2022-07-01

## 2022-06-27 NOTE — TELEPHONE ENCOUNTER
Patient calling for a refill of Oxycodone.     DOS: 6/13/22  Procedure: THORACIC 10 LUMBAR 2, POSTERIOR INSTRUMENTED FUSION, THORACIC 12 LAMINECTOMY  Surgeon: Dr Tashi Roth Post Op: 2    Current symptom(s): Back pain at worst 7-8/10. After pain medication 3/10. No other symptoms.   Current pain management: Oxycodone 10mg tab every 4 hours. Also Robaxin TID and Gabapentin TID    Last fill: 6/22/22  Next visit: 6/28/22    Medication pended for your approval, if appropriate. Pharmacy verified.     Any patient questions or concerns: no    Informed patient request will be forwarded to care team.

## 2022-06-28 ENCOUNTER — TELEPHONE (OUTPATIENT)
Dept: NEUROSURGERY | Facility: CLINIC | Age: 62
End: 2022-06-28

## 2022-06-28 ENCOUNTER — OFFICE VISIT (OUTPATIENT)
Dept: NEUROSURGERY | Facility: CLINIC | Age: 62
End: 2022-06-28
Attending: STUDENT IN AN ORGANIZED HEALTH CARE EDUCATION/TRAINING PROGRAM
Payer: COMMERCIAL

## 2022-06-28 VITALS
DIASTOLIC BLOOD PRESSURE: 71 MMHG | OXYGEN SATURATION: 97 % | HEART RATE: 90 BPM | SYSTOLIC BLOOD PRESSURE: 111 MMHG | TEMPERATURE: 98.3 F

## 2022-06-28 DIAGNOSIS — Z98.890 STATUS POST SPINAL SURGERY: ICD-10-CM

## 2022-06-28 DIAGNOSIS — S22.081A T12 BURST FRACTURE (H): Primary | ICD-10-CM

## 2022-06-28 PROCEDURE — 99024 POSTOP FOLLOW-UP VISIT: CPT

## 2022-06-28 ASSESSMENT — PAIN SCALES - GENERAL: PAINLEVEL: MODERATE PAIN (5)

## 2022-06-28 NOTE — PATIENT INSTRUCTIONS
Instructions for Patient  Keep your incision clean and dry at all times.   No bathing, swimming, or submerging in water until incision is well healed.    No lifting greater than 10-15 pounds. No bending, twisting, or overhead reaching.  Continue to wear brace as directed by your surgeon  Return in 4 weeks for follow up appointment and xray.  Weaning from narcotic pain medications  When it is time, start weaning by extending the time between doses.   For example, if you're taking 2 tabs every 4 hours, spread it out to 2 tabs over 4.5, 5, 6 hours.   At that point you can certainly cut down to 1 tab, then wean to an as needed basis until completely done with them.  For refills, please call our clinic. A nurse will call you back to obtain a pain assessment. Please call 3-4 business days before you run out so we can ensure there is a provider available at the location you prefer for .  Call the clinic or go to Emergency Room if you develop any new pain, drainage, swelling, or fever. Go to the Emergency Room if sudden onset of severe headache, weakness, confusion, change in level of consciousness, pain, or loss of movement.  Post-operative appointments  6 week post op with x-ray prior  3 months post op with CT prior   Please call clinic with any further questions or concerns    [facility]  [address] [address]  T:  [phone]  F:  [phone]

## 2022-06-28 NOTE — LETTER
"    6/28/2022         RE: Kalen Callejas  [address] [address] [facility] [id number]        Dear Colleague,    Thank you for referring your patient, Kalen Callejas, to the [facility] [place]. Please see a copy of my visit note below.    Patient presents for 2 week incision nurse visit check.     DOS: 6/13/22  Procedure: T10-L2 posterior fusion with T12 laminectomy  Surgeon: Sulaiman Akins MD     Patient report he has, \"never felt this good.\" Pain is right around the incision. Pain is 7/10 at its worse. Sitting makes the pain worse. Patient reports incision is itchy. Pain medications bring the pain down. Patient is taking oxycodone 1 tablet every 7 hours. Advised patient to take 1 pill every 8 hours as prescribed. Educated patient on staggering pain medications so he can \"bridge\" pain management between oxycodone doses. Encouraged patient to continue to take gabapentin as prescribed. Patient also taking methocarbamol 1 tab Q6 hours and tylenol 2 tablets every 6 hours. Patient and spouse voiced agreement and understanding with pain medication regimen.     Encouraged icing for at least 5-7 times throughout the day for 20-30 minutes at a time, avoiding heat to the incision area. Discussed maximum dose of Acetaminophen in 24 hours, along with starting NSAIDs as needed.     Patient is walking frequently without difficulty. Legs examined in clinic; no redness, swelling, or warmth noted. Patient denies any pain in bilateral calves. Activity restrictions reinforced at this time as outlined the After Visit Summary.     Patient's appetite is normal  Bowel/bladder problems? No  Taking stool softeners? No - patient states constipation is not a concern at this time.  Discussed reasons for constipation post-operatively and encouraged stool softeners while taking narcotic pain medication.     Patient denies signs of infection at incision site. Incision inspected. Edges well-approximated. " Slight redness around incision. No fever, swelling, drainage, or warmth noted. Incision prepped with ChloraPrep and suture removed without difficulty. Steri-strips applied. Aftercare instructions discussed with patient.     Refills given at this appointment? No  Sent for x-rays after this appointment? No  Return to work discussed at this appointment? Yes. Patient not currently working.    All of patient's questions addressed today. He was instructed to call with any additional questions/concerns.       Again, thank you for allowing me to participate in the care of your patient.        Sincerely,         Spine/Brain Nurse

## 2022-06-28 NOTE — PROGRESS NOTES
"Patient presents for 2 week incision nurse visit check.     DOS: 6/13/22  Procedure: T10-L2 posterior fusion with T12 laminectomy  Surgeon: Sulaiman Akins MD     Patient report he has, \"never felt this good.\" Pain is right around the incision. Pain is 7/10 at its worse. Sitting makes the pain worse. Patient reports incision is itchy. Pain medications bring the pain down. Patient is taking oxycodone 1 tablet every 7 hours. Advised patient to take 1 pill every 8 hours as prescribed. Educated patient on staggering pain medications so he can \"bridge\" pain management between oxycodone doses. Encouraged patient to continue to take gabapentin as prescribed. Patient also taking methocarbamol 1 tab Q6 hours and tylenol 2 tablets every 6 hours. Patient and spouse voiced agreement and understanding with pain medication regimen.     Encouraged icing for at least 5-7 times throughout the day for 20-30 minutes at a time, avoiding heat to the incision area. Discussed maximum dose of Acetaminophen in 24 hours, along with starting NSAIDs as needed.     Patient is walking frequently without difficulty. Legs examined in clinic; no redness, swelling, or warmth noted. Patient denies any pain in bilateral calves. Activity restrictions reinforced at this time as outlined the After Visit Summary.     Patient's appetite is normal  Bowel/bladder problems? No  Taking stool softeners? No - patient states constipation is not a concern at this time.  Discussed reasons for constipation post-operatively and encouraged stool softeners while taking narcotic pain medication.     Patient denies signs of infection at incision site. Incision inspected. Edges well-approximated. Slight redness around incision. No fever, swelling, drainage, or warmth noted. Incision prepped with ChloraPrep and suture removed without difficulty. Steri-strips applied. Aftercare instructions discussed with patient.     Refills given at this appointment? No  Sent for x-rays " after this appointment? No  Return to work discussed at this appointment? Yes. Patient not currently working.    All of patient's questions addressed today. He was instructed to call with any additional questions/concerns.

## 2022-06-28 NOTE — NURSING NOTE
"Kalen Callejas is a 62 year old male who presents for:  Chief Complaint   Patient presents with     Surgical Followup     DOS 6/13/22        Vitals:    Vitals:    06/28/22 1014   BP: 111/71   Pulse: 90   Temp: 98.3  F (36.8  C)   SpO2: 97%       BMI:  Estimated body mass index is 37.32 kg/m  as calculated from the following:    Height as of 6/9/22: 6' 2.5\" (1.892 m).    Weight as of 6/15/22: 294 lb 9.6 oz (133.6 kg).    Pain Score:  Moderate Pain (5)        Amendo Phorn      "

## 2022-06-29 ENCOUNTER — TELEPHONE (OUTPATIENT)
Dept: NEUROSURGERY | Facility: CLINIC | Age: 62
End: 2022-06-29

## 2022-06-29 NOTE — TELEPHONE ENCOUNTER
Patient would like a call back as he has questions about what he can and cannot do post surgery. Please call him back at [phone]. Thank you~

## 2022-06-29 NOTE — ADDENDUM NOTE
Addendum  created 06/28/22 2321 by Jaron Danielle MD    Clinical Note Signed, Intraprocedure Blocks edited, SmartForm saved

## 2022-06-29 NOTE — TELEPHONE ENCOUNTER
Patient called in with questions on restrictions before and after surgery -     Pt is s/p THORACIC 10 LUMBAR 2, POSTERIOR INSTRUMENTED FUSION, THORACIC 12 LAMINECTOMY, POSS PERCUTANEOUS SCREWS on 6/13/22 with Dr. Akins    Patient asked if he is able to drive, he is still taking oxycodone so he is not able to drive. Reviewed activity restrictions. Patient asked if it is okay that he use a walker to ambulate that he has at open, encouraged to use per comfort. Pt educated that he may use stairs in moderation and ambulate as tolerated.

## 2022-07-01 DIAGNOSIS — S22.081A T12 BURST FRACTURE (H): ICD-10-CM

## 2022-07-01 DIAGNOSIS — Z98.890 STATUS POST SPINAL SURGERY: ICD-10-CM

## 2022-07-01 RX ORDER — GABAPENTIN 300 MG/1
300 CAPSULE ORAL 3 TIMES DAILY
Qty: 30 CAPSULE | Refills: 1 | Status: SHIPPED | OUTPATIENT
Start: 2022-07-01 | End: 2022-07-11

## 2022-07-01 RX ORDER — METHOCARBAMOL 750 MG/1
750 TABLET, FILM COATED ORAL EVERY 6 HOURS PRN
Qty: 30 TABLET | Refills: 0 | Status: SHIPPED | OUTPATIENT
Start: 2022-07-01 | End: 2022-07-11

## 2022-07-01 RX ORDER — OXYCODONE HYDROCHLORIDE 10 MG/1
10 TABLET ORAL EVERY 8 HOURS PRN
Qty: 20 TABLET | Refills: 0 | Status: SHIPPED | OUTPATIENT
Start: 2022-07-01 | End: 2022-07-07

## 2022-07-01 NOTE — TELEPHONE ENCOUNTER
Patient calling for a refill of Oxy, robaxin, and gabapentin.     DOS: 6/13/22  Procedure: THORACIC 10 LUMBAR 2, POSTERIOR INSTRUMENTED FUSION, THORACIC 12 LAMINECTOMY  Surgeon: Dr Tashi Roth Post Op: 2 weeks 4 days     Current symptom(s):  Patient currently rating pain at a 4/10, but occassionally will spike to a 7. Pain is located in his lower back. No n/t or weakness. Using ice PRN.  Current pain management: Oxy, robaxin, and gabapentin as prescribed. Using tylenol PRN    Last fill: Oxy: 6/27 #20, gabapentin:6/24 #30, Robaxin: 6/24 #30  Next visit: 7/27 with Dr. Akins    Medication pended for your approval, if appropriate. Pharmacy verified.     Any patient questions or concerns: Patient states he has been having issues with constipation. Recommended patient begin taking miralax and senna's. Also advised patient to increase fluid intake and take short frequent walks. Patient voiced understanding and will call our clinic with further questions or concerns.     Informed patient request will be forwarded to care team.

## 2022-07-07 DIAGNOSIS — Z98.890 STATUS POST SPINAL SURGERY: ICD-10-CM

## 2022-07-07 DIAGNOSIS — S22.081A T12 BURST FRACTURE (H): ICD-10-CM

## 2022-07-07 RX ORDER — OXYCODONE HYDROCHLORIDE 10 MG/1
10 TABLET ORAL EVERY 8 HOURS PRN
Qty: 20 TABLET | Refills: 0 | Status: SHIPPED | OUTPATIENT
Start: 2022-07-10 | End: 2022-07-15

## 2022-07-07 NOTE — TELEPHONE ENCOUNTER
Patient called to request a refill of Oxycodone. He said he also has some questions. Please call him back at [phone]. Thank you~

## 2022-07-07 NOTE — TELEPHONE ENCOUNTER
"Patient calling for a refill of oxycodone.     DOS: 6/13/22  Procedure: THORACIC 10 LUMBAR 2, POSTERIOR INSTRUMENTED FUSION, THORACIC 12 LAMINECTOMY  Surgeon: Dr. Tashi Roth Post Op: 3 weeks 3 days    Current symptom(s): Patient rates pain at 4-6/10 when not taking pain medication. Medication brings pain down to 3/10 and patient feels comfortable. Low back and  incisional pain. Denies numbness, tingling, weakness. Patient reports he is \"itchy all over.\" Patient states if he does not itch, the itching improves. If he leaves it alone, it gets less but if itches it gets more itchy. No hives, no redness. Patient states his family thinks he may have dry skin as itching improved with lotion. Advised patient could try benadryl as well. Advised if itching continues to contact our clinic for possible medication change. Patient could also contact dermatologist for assessment if this continues. Advised patient to seek emergency care if itching becomes severe or breathing is impaired  Current pain management:   Oxycodone - 1 pill TID, trying to wean. Some days taking only 2 pills. Needs refill on Vipin 7/10.  Methocarbamol - 1 pill every 6 hours  Gabapentin - 1 pill TID  Ibuprofen - PRN  Tylenol - PRN  Ice - Patient reports he really enjoys using ice    Last fill: 7/1/22 #20  Next visit: 7/27/22    Medication pended for your approval, if appropriate. Pharmacy verified.     Any patient questions or concerns: Reviewed post-op restrictions with patient. Advised to not go on riding  at this time. Advised to not lift weights at this time. Patient voiced agreement and understanding.     Informed patient request will be forwarded to care team.       "

## 2022-07-11 ENCOUNTER — TELEPHONE (OUTPATIENT)
Dept: NEUROSURGERY | Facility: CLINIC | Age: 62
End: 2022-07-11

## 2022-07-11 DIAGNOSIS — Z98.890 S/P SPINAL SURGERY: Primary | ICD-10-CM

## 2022-07-11 DIAGNOSIS — S22.081A T12 BURST FRACTURE (H): ICD-10-CM

## 2022-07-11 RX ORDER — GABAPENTIN 300 MG/1
300 CAPSULE ORAL 3 TIMES DAILY
Qty: 30 CAPSULE | Refills: 1 | Status: SHIPPED | OUTPATIENT
Start: 2022-07-11 | End: 2022-07-27

## 2022-07-11 RX ORDER — METHOCARBAMOL 750 MG/1
750 TABLET, FILM COATED ORAL EVERY 6 HOURS PRN
Qty: 30 TABLET | Refills: 0 | Status: SHIPPED | OUTPATIENT
Start: 2022-07-11 | End: 2022-07-20

## 2022-07-11 NOTE — TELEPHONE ENCOUNTER
Patient calling for a refill of Gabapentin and Methocarbamol.     DOS: 6/13/22  Procedure: THORACIC 10 LUMBAR 2, POSTERIOR INSTRUMENTED FUSION, THORACIC 12 LAMINECTOMY  Surgeon: Dr Tashi Roth Post Op: 4    Current symptom(s): Patient rates low back pain at 5/10 before medication and 3/10 after. No other/new symptoms.   Current pain management: 1 tab of Oxy TID, 1 tab of Methocarbamol QID, 1 Gabapentin TID, tylenol and Ibuprofen    Last fill: 7/1/22  Next visit: 7/27/22    Medication pended for your approval, if appropriate. Pharmacy verified.     Any patient questions or concerns: no    Informed patient request will be forwarded to care team.

## 2022-07-15 DIAGNOSIS — Z98.890 STATUS POST SPINAL SURGERY: ICD-10-CM

## 2022-07-15 DIAGNOSIS — S22.081A T12 BURST FRACTURE (H): ICD-10-CM

## 2022-07-15 RX ORDER — OXYCODONE HYDROCHLORIDE 10 MG/1
10 TABLET ORAL EVERY 8 HOURS PRN
Qty: 20 TABLET | Refills: 0 | Status: SHIPPED | OUTPATIENT
Start: 2022-07-17 | End: 2022-07-20

## 2022-07-15 NOTE — TELEPHONE ENCOUNTER
Patient calling for a refill of oxycodone    DOS: 6/13/22  Procedure: THORACIC 10 LUMBAR 2, POSTERIOR INSTRUMENTED FUSION, THORACIC 12 LAMINECTOMY  Surgeon: Dr Tashi Roth Post Op: 4    Current symptom(s):   Pain increase due to increase activity. Lower middle back. 6/10, 4/10 after pain medication. Restrictions reviewed with patient.      Current pain management:   Oxycodone: 3-4 tabs/day, reviewed not to take more than 3/day   Methocarbamol: BID, educated to utilize up to 4/day  Gabapentin: taking  Tylenol: taking, educated he can take up to 3,000 mg/day    Last fill: 7/11/22  Next visit: 7/27/22    Medication pended for your approval, if appropriate. Pharmacy verified.     Post dated to Sunday 7/17/22    Any patient questions or concerns: NA    Informed patient request will be forwarded to care team.

## 2022-07-19 DIAGNOSIS — S22.081A T12 BURST FRACTURE (H): ICD-10-CM

## 2022-07-19 DIAGNOSIS — Z98.890 STATUS POST SPINAL SURGERY: ICD-10-CM

## 2022-07-19 DIAGNOSIS — Z98.890 S/P SPINAL SURGERY: ICD-10-CM

## 2022-07-19 NOTE — TELEPHONE ENCOUNTER
Attempted to reach out to patient, no answer. Left voice message for patient to call clinic back to further discuss.     Patient calling for a refill of methocarbamol.     DOS: 6/13/22  Procedure: THORACIC 10 LUMBAR 2, POSTERIOR INSTRUMENTED FUSION, THORACIC 12 LAMINECTOMY  Surgeon: Dr. Tashi Roth Post Op: 5 weeks 1 day    Last fill: 7/11/22 #30  Next visit: 7/27/22

## 2022-07-20 ENCOUNTER — TELEPHONE (OUTPATIENT)
Dept: NEUROSURGERY | Facility: CLINIC | Age: 62
End: 2022-07-20

## 2022-07-20 RX ORDER — OXYCODONE HYDROCHLORIDE 10 MG/1
10 TABLET ORAL EVERY 8 HOURS PRN
Qty: 20 TABLET | Refills: 0 | Status: SHIPPED | OUTPATIENT
Start: 2022-07-22 | End: 2022-07-26

## 2022-07-20 RX ORDER — METHOCARBAMOL 750 MG/1
750 TABLET, FILM COATED ORAL EVERY 6 HOURS PRN
Qty: 30 TABLET | Refills: 0 | Status: SHIPPED | OUTPATIENT
Start: 2022-07-20 | End: 2022-07-27

## 2022-07-20 NOTE — TELEPHONE ENCOUNTER
Patient calling for a refill of methocarbamol and oxycodone.      DOS: 6/13/22  Procedure: THORACIC 10 LUMBAR 2, POSTERIOR INSTRUMENTED FUSION, THORACIC 12 LAMINECTOMY  Surgeon: Dr. Tashi Roth Post Op: 5 weeks 2 days    Current symptom(s):   Pain rating 5/10. Lower middle of back. No new s/s.    Current pain management:   Robaxin: taking 5/day, educated to ONLY take up to 4/day  Oxycodone: TID, was taking 1 tab 1 7 hours, educated to ONLY take 1 tab q 8 hours PRN  Tylenol: NA, educated ability to take up to 3,000 mg/day to encourage weaning from oxycodone  Ibuprofen: PRN  Ice: Every other day     Pt not compliant with how he is taking his medications. Educated several times to not take more than what is prescribed. Continue to remind him of this at every refill.      Last fill: 7/11/22 #30 robaxin, 7/17/22 #20 oxycodone   Next visit: 7/27/22    Patient has 6 tabs of Oxycodone left, pharmacy is closed on Saturdays. Robaxin pended to fill today, oxycodone pended for Friday 7/22/22      Medication pended for your approval, if appropriate. Pharmacy verified.     Any patient questions or concerns: as listed above    Informed patient request will be forwarded to care team.

## 2022-07-26 DIAGNOSIS — Z98.890 S/P SPINAL SURGERY: ICD-10-CM

## 2022-07-26 RX ORDER — OXYCODONE HYDROCHLORIDE 10 MG/1
10 TABLET ORAL EVERY 8 HOURS PRN
Qty: 20 TABLET | Refills: 0 | Status: SHIPPED | OUTPATIENT
Start: 2022-07-26 | End: 2022-08-03

## 2022-07-26 NOTE — TELEPHONE ENCOUNTER
"Patient calling for a refill of Oxycodone.     DOS: 6/13/22  Procedure: THORACIC 10 LUMBAR 2, POSTERIOR INSTRUMENTED FUSION, THORACIC 12 LAMINECTOMY  Surgeon: Dr. Tashi Roth Post Op: 6 weeks 1 day       Current symptom(s): Patient currently rating his pain at a 5-6/10 when not taking oxycodone. 3/10 when taking oxycodone. Denies any new n/t or weakness. States he is \"doing pretty good\". Advised patient to wean down to 2 tabs of oxycodone if able and use Tylenol it its place.    Current pain management: Oxycodone TID, Robaxin QID (states he sometimes takes 5 tabs a day. Educated patient max is 4 tabs per day) Advised patient to begin using tylenol when trying to wean down oxycodone use     Last fill: 7/22 #20, will run out tomorrow    Next visit: 7/27 with Dr. Akins     Medication pended for your approval, if appropriate. Pharmacy verified.     Any patient questions or concerns: none    Informed patient request will be forwarded to care team.     "

## 2022-07-27 ENCOUNTER — ANCILLARY PROCEDURE (OUTPATIENT)
Dept: GENERAL RADIOLOGY | Facility: CLINIC | Age: 62
End: 2022-07-27
Attending: STUDENT IN AN ORGANIZED HEALTH CARE EDUCATION/TRAINING PROGRAM
Payer: COMMERCIAL

## 2022-07-27 ENCOUNTER — OFFICE VISIT (OUTPATIENT)
Dept: NEUROSURGERY | Facility: CLINIC | Age: 62
End: 2022-07-27
Attending: STUDENT IN AN ORGANIZED HEALTH CARE EDUCATION/TRAINING PROGRAM
Payer: COMMERCIAL

## 2022-07-27 VITALS — HEART RATE: 120 BPM | OXYGEN SATURATION: 99 % | SYSTOLIC BLOOD PRESSURE: 130 MMHG | DIASTOLIC BLOOD PRESSURE: 83 MMHG

## 2022-07-27 DIAGNOSIS — Z98.1 S/P SPINAL FUSION: ICD-10-CM

## 2022-07-27 DIAGNOSIS — Z98.1 S/P SPINAL FUSION: Primary | ICD-10-CM

## 2022-07-27 DIAGNOSIS — Z98.890 S/P SPINAL SURGERY: ICD-10-CM

## 2022-07-27 PROBLEM — E11.9 DIABETES MELLITUS, TYPE 2 (H): Status: ACTIVE | Noted: 2022-07-27

## 2022-07-27 PROCEDURE — 99024 POSTOP FOLLOW-UP VISIT: CPT | Performed by: STUDENT IN AN ORGANIZED HEALTH CARE EDUCATION/TRAINING PROGRAM

## 2022-07-27 PROCEDURE — G0463 HOSPITAL OUTPT CLINIC VISIT: HCPCS

## 2022-07-27 PROCEDURE — 72080 X-RAY EXAM THORACOLMB 2/> VW: CPT | Mod: TC | Performed by: RADIOLOGY

## 2022-07-27 RX ORDER — METHOCARBAMOL 750 MG/1
750 TABLET, FILM COATED ORAL EVERY 6 HOURS PRN
Qty: 30 TABLET | Refills: 0 | Status: SHIPPED | OUTPATIENT
Start: 2022-07-27 | End: 2022-08-03

## 2022-07-27 RX ORDER — GABAPENTIN 300 MG/1
CAPSULE ORAL
Qty: 21 CAPSULE | Refills: 0 | Status: ON HOLD | OUTPATIENT
Start: 2022-07-27 | End: 2023-08-25

## 2022-07-27 ASSESSMENT — PAIN SCALES - GENERAL: PAINLEVEL: MILD PAIN (3)

## 2022-07-27 NOTE — PROGRESS NOTES
"HPI:  62-year-old male status post T10-L2 posterior fusion and T12 laminectomy for a T12 burst fracture.  Postoperatively he has been doing very well.  He denies any radiating pain down the legs and no numbness tingling or weakness.  He does have some pain on the right side of his back but states this is worsened with keeping the brace on as well.  He is otherwise trying to wean down on his oxycodone and feels that the muscle relaxers are one of the most helpful medications for him right now.  He is hoping to get back into more activities soon as possible.  Current Outpatient Medications   Medication     gabapentin (NEURONTIN) 300 MG capsule     methocarbamol (ROBAXIN) 750 MG tablet     acetaminophen (TYLENOL) 325 MG tablet     albuterol (PROAIR HFA/PROVENTIL HFA/VENTOLIN HFA) 108 (90 Base) MCG/ACT inhaler     azelastine (ASTEPRO) 0.15 % nasal spray     budesonide (PULMICORT FLEXHALER) 180 MCG/ACT inhaler     fluticasone (FLONASE) 50 MCG/ACT nasal spray     lisinopril (ZESTRIL) 20 MG tablet     oxyCODONE IR (ROXICODONE) 10 MG tablet     No current facility-administered medications for this visit.      Physical Exam:  Vital signs:      BP: 130/83 Pulse: 120     SpO2: 99 %          Estimated body mass index is 37.32 kg/m  as calculated from the following:    Height as of 6/9/22: 1.892 m (6' 2.5\").    Weight as of 6/15/22: 133.6 kg (294 lb 9.6 oz).   He has full strength in his bilateral upper and lower extremities.  Sensation is intact light touch throughout.  His incision is well-healed with a small scab in the middle of the incision without any sign of dehiscence or erythema and no drainage.  Results Reviewed:  Impression reviewed x-rays from today showing stable hardware orientation without any known complications and no postop kyphosis.  Assessment:  62-year-old male status post T10-L2 posterior fusion and decompression for T12 burst fracture.  Plan:  He can increase activity to lifting up to 30 pounds.  We will " also refer him to start physical therapy now.  See him back in 6 weeks with x-rays once again.  He does not need to wear his brace anymore.    Sulaiman Akins MD

## 2022-07-27 NOTE — PROGRESS NOTES
"HPI:  Current Outpatient Medications   Medication     gabapentin (NEURONTIN) 300 MG capsule     methocarbamol (ROBAXIN) 750 MG tablet     acetaminophen (TYLENOL) 325 MG tablet     albuterol (PROAIR HFA/PROVENTIL HFA/VENTOLIN HFA) 108 (90 Base) MCG/ACT inhaler     azelastine (ASTEPRO) 0.15 % nasal spray     budesonide (PULMICORT FLEXHALER) 180 MCG/ACT inhaler     fluticasone (FLONASE) 50 MCG/ACT nasal spray     lisinopril (ZESTRIL) 20 MG tablet     oxyCODONE IR (ROXICODONE) 10 MG tablet     No current facility-administered medications for this visit.      Physical Exam:  Vital signs:      BP: 130/83 Pulse: 120     SpO2: 99 %          Estimated body mass index is 37.32 kg/m  as calculated from the following:    Height as of 6/9/22: 1.892 m (6' 2.5\").    Weight as of 6/15/22: 133.6 kg (294 lb 9.6 oz).  Results Reviewed:  Assessment:  Plan:    "

## 2022-07-27 NOTE — LETTER
"    7/27/2022         RE: Kalen Callejas  [address] [place] [address]        Dear Colleague,    Thank you for referring your patient, Kalen Callejas, to the [facility] [place]. Please see a copy of my visit note below.    HPI:  62-year-old male status post T10-L2 posterior fusion and T12 laminectomy for a T12 burst fracture.  Postoperatively he has been doing very well.  He denies any radiating pain down the legs and no numbness tingling or weakness.  He does have some pain on the right side of his back but states this is worsened with keeping the brace on as well.  He is otherwise trying to wean down on his oxycodone and feels that the muscle relaxers are one of the most helpful medications for him right now.  He is hoping to get back into more activities soon as possible.  Current Outpatient Medications   Medication     gabapentin (NEURONTIN) 300 MG capsule     methocarbamol (ROBAXIN) 750 MG tablet     acetaminophen (TYLENOL) 325 MG tablet     albuterol (PROAIR HFA/PROVENTIL HFA/VENTOLIN HFA) 108 (90 Base) MCG/ACT inhaler     azelastine (ASTEPRO) 0.15 % nasal spray     budesonide (PULMICORT FLEXHALER) 180 MCG/ACT inhaler     fluticasone (FLONASE) 50 MCG/ACT nasal spray     lisinopril (ZESTRIL) 20 MG tablet     oxyCODONE IR (ROXICODONE) 10 MG tablet     No current facility-administered medications for this visit.      Physical Exam:  Vital signs:      BP: 130/83 Pulse: 120     SpO2: 99 %          Estimated body mass index is 37.32 kg/m  as calculated from the following:    Height as of 6/9/22: 1.892 m (6' 2.5\").    Weight as of 6/15/22: 133.6 kg (294 lb 9.6 oz).   He has full strength in his bilateral upper and lower extremities.  Sensation is intact light touch throughout.  His incision is well-healed with a small scab in the middle of the incision without any sign of dehiscence or erythema and no drainage.  Results Reviewed:  Impression reviewed x-rays " "from today showing stable hardware orientation without any known complications and no postop kyphosis.  Assessment:  62-year-old male status post T10-L2 posterior fusion and decompression for T12 burst fracture.  Plan:  He can increase activity to lifting up to 30 pounds.  We will also refer him to start physical therapy now.  See him back in 6 weeks with x-rays once again.  He does not need to wear his brace anymore.    Sulaiman Akins MD    HPI:  Current Outpatient Medications   Medication     gabapentin (NEURONTIN) 300 MG capsule     methocarbamol (ROBAXIN) 750 MG tablet     acetaminophen (TYLENOL) 325 MG tablet     albuterol (PROAIR HFA/PROVENTIL HFA/VENTOLIN HFA) 108 (90 Base) MCG/ACT inhaler     azelastine (ASTEPRO) 0.15 % nasal spray     budesonide (PULMICORT FLEXHALER) 180 MCG/ACT inhaler     fluticasone (FLONASE) 50 MCG/ACT nasal spray     lisinopril (ZESTRIL) 20 MG tablet     oxyCODONE IR (ROXICODONE) 10 MG tablet     No current facility-administered medications for this visit.      Physical Exam:  Vital signs:      BP: 130/83 Pulse: 120     SpO2: 99 %          Estimated body mass index is 37.32 kg/m  as calculated from the following:    Height as of 6/9/22: 1.892 m (6' 2.5\").    Weight as of 6/15/22: 133.6 kg (294 lb 9.6 oz).  Results Reviewed:  Assessment:  Plan:        Again, thank you for allowing me to participate in the care of your patient.        Sincerely,        Sulaiman Akins MD    "

## 2022-08-03 DIAGNOSIS — Z98.890 S/P SPINAL SURGERY: ICD-10-CM

## 2022-08-03 RX ORDER — METHOCARBAMOL 750 MG/1
750 TABLET, FILM COATED ORAL EVERY 6 HOURS PRN
Qty: 30 TABLET | Refills: 0 | Status: SHIPPED | OUTPATIENT
Start: 2022-08-03 | End: 2023-10-02

## 2022-08-03 RX ORDER — OXYCODONE HYDROCHLORIDE 10 MG/1
10 TABLET ORAL EVERY 8 HOURS PRN
Qty: 20 TABLET | Refills: 0 | Status: SHIPPED | OUTPATIENT
Start: 2022-08-03 | End: 2022-08-18

## 2022-08-03 NOTE — TELEPHONE ENCOUNTER
"Patient calling for a refill of Oxycodone and Robaxin    DOS: 6/13/22  Procedure: THORACIC 10 LUMBAR 2, POSTERIOR INSTRUMENTED FUSION, THORACIC 12 LAMINECTOMY  Surgeon: Dr. Tashi Roth Post Op: 7 weeks 2 days    Current symptom(s): Patient currently rating his pain at a 6/10. Pain is located in center of lower back. Denied any n/t or weakness. Feels \"stiff\". States he was working in the yard last week and may have over done it. Reviewed and educated patient on post op restrictions.     Current pain management: Using Oxycodone and Robaxin as needed. Using ice and tylenol throughout the day     Last fill: Oxy 7/26 #20 Robaxin 7/27 #30  Next visit: 9/7 with Dr. Akins    Medication pended for your approval, if appropriate. Pharmacy verified.     Any patient questions or concerns: none    Informed patient request will be forwarded to care team.     "

## 2022-08-08 ENCOUNTER — THERAPY VISIT (OUTPATIENT)
Dept: PHYSICAL THERAPY | Facility: CLINIC | Age: 62
End: 2022-08-08
Attending: STUDENT IN AN ORGANIZED HEALTH CARE EDUCATION/TRAINING PROGRAM
Payer: COMMERCIAL

## 2022-08-08 DIAGNOSIS — Z98.890 S/P SPINAL SURGERY: ICD-10-CM

## 2022-08-08 PROCEDURE — 97161 PT EVAL LOW COMPLEX 20 MIN: CPT | Mod: GP | Performed by: PHYSICAL THERAPIST

## 2022-08-08 PROCEDURE — 97530 THERAPEUTIC ACTIVITIES: CPT | Mod: GP | Performed by: PHYSICAL THERAPIST

## 2022-08-08 PROCEDURE — 97110 THERAPEUTIC EXERCISES: CPT | Mod: 59 | Performed by: PHYSICAL THERAPIST

## 2022-08-08 NOTE — PROGRESS NOTES
Physical Therapy Initial Evaluation  Subjective:    Therapist Generated HPI Evaluation  Problem details: Pt injured back 6/9/2022, spinal fusion surgery 6/13/2022 for T-12 compression fracture.  He is having min.pain with walking, but notes he fatigues rapidly, and is uncomfortable sitting for more than 15 min.  MD lifting restrictions - 30#.      Pt notes increased pain in the AM and that he is trying to do small jobs around the house but he gets tired and sore. Pain pills daily.     Pt takes walks with his grand daughter and would like to be more active with less pain.     .         Type of problem:  Thoracic spine.    This is a new condition.  Condition occurred with:  A fall/slip.  Where condition occurred: at home.  Patient reports pain:  Lower thoracic.  Pain is described as other and is constant (pain pill helps).  Pain radiates to:  None. Pain is worse during the day and worse in the A.M..  Since onset symptoms are unchanged.  Associated symptoms:  Fatigue and loss of motion/stiffness. Symptoms are exacerbated by sitting  and relieved by analgesics.      Barriers include:  None as reported by patient.                        Objective:  System         Lumbar/SI Evaluation  ROM:    AROM Thoracic:  Flex:             Min  Ext:                Min  Rotation:        Left:     Right:      Strength: LE general weakness, 4/5, shakey with heel waking and toe walking  Lumbar Myotomes:  not assessed                Lumbar Dermtomes:  normal                Neural Tension/Mobility:  Lumbar:  Not assessed  Thoracic:  Not assessed      Lumbar Palpation:    Tenderness present at Left:    Quadratus Lumborum and Erector Spinae  Tenderness present at Right: Quadratus Lumborum and Erector Spinae             Cervical/Thoracic Evaluation    AROM:  AROM Cervical:    Flexion:       Wnl  Extension:        Rotation:         Left:     Right:  Side Bend:      Left:     Right:   AROM Thoracic:    Flexion:             50%, pain  Extension:           Wnl, +  Rotation:            Left:     Right:    Strength: not tested  Headaches: none  Cervical Myotomes:  not assessed                  DTR's:  not assessed                                                              General     ROS    Assessment/Plan:    Patient is a 62 year old male with thoracic complaints.    Patient has the following significant findings with corresponding treatment plan.                Diagnosis 1:  Post op thoracic compression fracture  Pain -  hot/cold therapy and manual therapy  Decreased ROM/flexibility - manual therapy and therapeutic exercise  Decreased strength - therapeutic exercise and therapeutic activities    Therapy Evaluation Codes:   1) History comprised of:   Personal factors that impact the plan of care:      None.    Comorbidity factors that impact the plan of care are:      None.     Medications impacting care: Anti-inflammatory and Muscle relaxant.  2) Examination of Body Systems comprised of:   Body structures and functions that impact the plan of care:      Thoracic Spine.   Activity limitations that impact the plan of care are:      Bathing, Sitting and Walking.  3) Clinical presentation characteristics are:   Stable/Uncomplicated.  4) Decision-Making    Low complexity using standardized patient assessment instrument and/or measureable assessment of functional outcome.  Cumulative Therapy Evaluation is: Low complexity.    Previous and current functional limitations:  (See Goal Flow Sheet for this information)    Short term and Long term goals: (See Goal Flow Sheet for this information)     Communication ability:  Patient appears to be able to clearly communicate and understand verbal and written communication and follow directions correctly.  Treatment Explanation - The following has been discussed with the patient:   RX ordered/plan of care  Anticipated outcomes  Possible risks and side effects  This patient would benefit from PT intervention to resume normal  activities.   Rehab potential is fair.    Frequency:  1 X week, once daily  Duration:  for 4 weeks tapering to 1 X a week over 4 weeks  Discharge Plan:  Achieve all LTG.  Independent in home treatment program.  Reach maximal therapeutic benefit.    Please refer to the daily flowsheet for treatment today, total treatment time and time spent performing 1:1 timed codes.

## 2022-08-18 ENCOUNTER — THERAPY VISIT (OUTPATIENT)
Dept: PHYSICAL THERAPY | Facility: CLINIC | Age: 62
End: 2022-08-18
Payer: COMMERCIAL

## 2022-08-18 DIAGNOSIS — Z98.890 S/P SPINAL SURGERY: ICD-10-CM

## 2022-08-18 DIAGNOSIS — Z98.890 S/P SPINAL SURGERY: Primary | ICD-10-CM

## 2022-08-18 PROCEDURE — 97110 THERAPEUTIC EXERCISES: CPT | Mod: GP | Performed by: PHYSICAL THERAPIST

## 2022-08-18 PROCEDURE — 97112 NEUROMUSCULAR REEDUCATION: CPT | Mod: GP | Performed by: PHYSICAL THERAPIST

## 2022-08-18 RX ORDER — OXYCODONE HYDROCHLORIDE 10 MG/1
10 TABLET ORAL EVERY 8 HOURS PRN
Qty: 20 TABLET | Refills: 0 | Status: SHIPPED | OUTPATIENT
Start: 2022-08-18 | End: 2022-08-30

## 2022-08-18 NOTE — TELEPHONE ENCOUNTER
Patient calling for a refill of Oxycodone.     DOS: 6/13/22  Procedure: THORACIC 10 LUMBAR 2, POSTERIOR INSTRUMENTED FUSION, THORACIC 12 LAMINECTOMY  Surgeon: Dr. Tashi Roth Post Op: 9    Current symptom(s): Mid back pain typically 4/10 but today 6/10 after PT. No other symptoms.  Current pain management: 3 tabs of Oxy/day at most. Robaxin, Tylenol and ice.     Last fill: 8/3/22  Next visit: 9/7/22    Medication pended for your approval, if appropriate. Pharmacy verified.     Any patient questions or concerns: no    Informed patient request will be forwarded to care team.

## 2022-08-22 ENCOUNTER — THERAPY VISIT (OUTPATIENT)
Dept: PHYSICAL THERAPY | Facility: CLINIC | Age: 62
End: 2022-08-22
Attending: STUDENT IN AN ORGANIZED HEALTH CARE EDUCATION/TRAINING PROGRAM
Payer: COMMERCIAL

## 2022-08-22 DIAGNOSIS — Z98.890 S/P SPINAL SURGERY: Primary | ICD-10-CM

## 2022-08-22 PROCEDURE — 97110 THERAPEUTIC EXERCISES: CPT | Mod: GP | Performed by: PHYSICAL THERAPIST

## 2022-08-22 PROCEDURE — 97112 NEUROMUSCULAR REEDUCATION: CPT | Mod: GP | Performed by: PHYSICAL THERAPIST

## 2022-08-30 DIAGNOSIS — Z98.890 S/P SPINAL SURGERY: ICD-10-CM

## 2022-08-30 RX ORDER — OXYCODONE HYDROCHLORIDE 10 MG/1
10 TABLET ORAL EVERY 8 HOURS PRN
Qty: 20 TABLET | Refills: 0 | Status: ON HOLD | OUTPATIENT
Start: 2022-08-30 | End: 2023-08-25

## 2022-08-30 NOTE — TELEPHONE ENCOUNTER
Patient is call to request a refill of his oxycodone prescription, he would like a call back at [phone].

## 2022-08-30 NOTE — TELEPHONE ENCOUNTER
Patient calling for a refill of Oxycodone.      DOS: 6/13/22  Procedure: THORACIC 10 LUMBAR 2, POSTERIOR INSTRUMENTED FUSION, THORACIC 12 LAMINECTOMY  Surgeon: Dr. Tashi Roth Post Op: 11 weeks 1 day    Advised patient this will likely be the last refill from neurosurgery as we will only fill for 12 weeks post-op.    Current symptom(s): 7/10 at its worst. 3/10 after medication. Middle low back. Pain is up and down with increased activity. Denies new numbness/tingling/weakness.  Current pain management:   Oxycodone - Depends on the day. 0-3 per day.  Robaxin - Doesn't help. Makes sleepy.   Tylenol - Does not help  Ibuprofen - PRN  Ice - PRN, 'feels good'    Last fill: 8/18/22 #20  Next visit: 9/7/22    Medication pended for your approval, if appropriate. Pharmacy verified.     Any patient questions or concerns:     Informed patient request will be forwarded to care team.

## 2022-08-30 NOTE — TELEPHONE ENCOUNTER
Pain management referral placed per care team. Writer called patient to update. Patient voiced agreement and understanding.     Phone number for pain management sent to patient via e-mail ([email or web address]).

## 2022-09-07 ENCOUNTER — OFFICE VISIT (OUTPATIENT)
Dept: NEUROSURGERY | Facility: CLINIC | Age: 62
End: 2022-09-07
Attending: STUDENT IN AN ORGANIZED HEALTH CARE EDUCATION/TRAINING PROGRAM
Payer: COMMERCIAL

## 2022-09-07 ENCOUNTER — ANCILLARY PROCEDURE (OUTPATIENT)
Dept: GENERAL RADIOLOGY | Facility: CLINIC | Age: 62
End: 2022-09-07
Attending: STUDENT IN AN ORGANIZED HEALTH CARE EDUCATION/TRAINING PROGRAM
Payer: COMMERCIAL

## 2022-09-07 VITALS — OXYGEN SATURATION: 97 % | SYSTOLIC BLOOD PRESSURE: 131 MMHG | DIASTOLIC BLOOD PRESSURE: 85 MMHG | HEART RATE: 106 BPM

## 2022-09-07 DIAGNOSIS — S22.081A T12 BURST FRACTURE (H): Primary | ICD-10-CM

## 2022-09-07 DIAGNOSIS — Z98.890 S/P SPINAL SURGERY: ICD-10-CM

## 2022-09-07 PROCEDURE — G0463 HOSPITAL OUTPT CLINIC VISIT: HCPCS

## 2022-09-07 PROCEDURE — 99024 POSTOP FOLLOW-UP VISIT: CPT | Performed by: STUDENT IN AN ORGANIZED HEALTH CARE EDUCATION/TRAINING PROGRAM

## 2022-09-07 PROCEDURE — 72080 X-RAY EXAM THORACOLMB 2/> VW: CPT | Mod: TC | Performed by: RADIOLOGY

## 2022-09-07 RX ORDER — MELOXICAM 7.5 MG/1
7.5 TABLET ORAL DAILY
Qty: 60 TABLET | Refills: 1 | Status: SHIPPED | OUTPATIENT
Start: 2022-09-07 | End: 2022-10-06

## 2022-09-07 ASSESSMENT — PAIN SCALES - GENERAL: PAINLEVEL: MILD PAIN (3)

## 2022-09-07 NOTE — LETTER
"    9/7/2022         RE: Kalen Callejas  [address] [address]        Dear Colleague,    Thank you for referring your patient, Kalen Callejas, to the [facility] [place]. Please see a copy of my visit note below.    HPI:  62-year-old male status post T10-L2 posterior fusion and instrumentation and T12 laminectomy for a T12 fracture.  He is continue to have some back pain although this has improved since surgery.  He denies any significant pain radiating down the legs.  He is nervous about transitioning back to normal activity.  He is weaning down his opioid medications for his pain right now.  Current Outpatient Medications   Medication     meloxicam (MOBIC) 7.5 MG tablet     acetaminophen (TYLENOL) 325 MG tablet     albuterol (PROAIR HFA/PROVENTIL HFA/VENTOLIN HFA) 108 (90 Base) MCG/ACT inhaler     azelastine (ASTEPRO) 0.15 % nasal spray     budesonide (PULMICORT FLEXHALER) 180 MCG/ACT inhaler     fluticasone (FLONASE) 50 MCG/ACT nasal spray     gabapentin (NEURONTIN) 300 MG capsule     lisinopril (ZESTRIL) 20 MG tablet     methocarbamol (ROBAXIN) 750 MG tablet     oxyCODONE IR (ROXICODONE) 10 MG tablet     No current facility-administered medications for this visit.      Physical Exam:  Vital signs:      BP: 131/85 Pulse: 106     SpO2: 97 %          Estimated body mass index is 37.32 kg/m  as calculated from the following:    Height as of 6/9/22: 1.892 m (6' 2.5\").    Weight as of 6/15/22: 133.6 kg (294 lb 9.6 oz).   He has full strength in his bilateral lower extremities.  Sensation is intact light touch throughout.  Incision is well-healed.  Results Reviewed:  I personally viewed the images of x-rays of his thoracolumbar spine showing intact hardware without any significant increase in kyphosis or compression of the T12 fracture.  Assessment:  62-year-old male status post T10-L2 fusion for a T12 fracture.  Plan:  Okay to continue to increase " activity adding 5 to 10 pounds of lifting restriction weekly.  He can lift up to 50 pounds max for the next 3 months.  I will see him back in 3 months for a 6-month total follow-up.  At that point I expect to fully release him without any restrictions going forward.  Continue to wean down on pain medication.  Sulaiman Akins MD          Again, thank you for allowing me to participate in the care of your patient.        Sincerely,        Sulaiman Akins MD

## 2022-09-07 NOTE — PATIENT INSTRUCTIONS
Patient Next Steps:    Dr. Akins would like to see you back in the clinic for follow up in 3 month(s) with X-ray prior. Please call the number below to schedule.       Please call us if you have any further questions or concerns.    [facility]   Phone: [phone]  Fax: [phone]

## 2022-09-07 NOTE — PROGRESS NOTES
"HPI:  62-year-old male status post T10-L2 posterior fusion and instrumentation and T12 laminectomy for a T12 fracture.  He is continue to have some back pain although this has improved since surgery.  He denies any significant pain radiating down the legs.  He is nervous about transitioning back to normal activity.  He is weaning down his opioid medications for his pain right now.  Current Outpatient Medications   Medication     meloxicam (MOBIC) 7.5 MG tablet     acetaminophen (TYLENOL) 325 MG tablet     albuterol (PROAIR HFA/PROVENTIL HFA/VENTOLIN HFA) 108 (90 Base) MCG/ACT inhaler     azelastine (ASTEPRO) 0.15 % nasal spray     budesonide (PULMICORT FLEXHALER) 180 MCG/ACT inhaler     fluticasone (FLONASE) 50 MCG/ACT nasal spray     gabapentin (NEURONTIN) 300 MG capsule     lisinopril (ZESTRIL) 20 MG tablet     methocarbamol (ROBAXIN) 750 MG tablet     oxyCODONE IR (ROXICODONE) 10 MG tablet     No current facility-administered medications for this visit.      Physical Exam:  Vital signs:      BP: 131/85 Pulse: 106     SpO2: 97 %          Estimated body mass index is 37.32 kg/m  as calculated from the following:    Height as of 6/9/22: 1.892 m (6' 2.5\").    Weight as of 6/15/22: 133.6 kg (294 lb 9.6 oz).   He has full strength in his bilateral lower extremities.  Sensation is intact light touch throughout.  Incision is well-healed.  Results Reviewed:  I personally viewed the images of x-rays of his thoracolumbar spine showing intact hardware without any significant increase in kyphosis or compression of the T12 fracture.  Assessment:  62-year-old male status post T10-L2 fusion for a T12 fracture.  Plan:  Okay to continue to increase activity adding 5 to 10 pounds of lifting restriction weekly.  He can lift up to 50 pounds max for the next 3 months.  I will see him back in 3 months for a 6-month total follow-up.  At that point I expect to fully release him without any restrictions going forward.  Continue to wean " down on pain medication.  Sulaiman Akins MD

## 2022-09-27 ENCOUNTER — TELEPHONE (OUTPATIENT)
Dept: NEUROSURGERY | Facility: CLINIC | Age: 62
End: 2022-09-27

## 2022-09-27 DIAGNOSIS — Z98.890 S/P SPINAL SURGERY: Primary | ICD-10-CM

## 2022-09-27 NOTE — TELEPHONE ENCOUNTER
"Last Visit: 9/7/22    Next Visit: na    Name of Provider:  Dr Akins    Assessment: Patient calls to ask for a \"stronger pain medication\".  S/p T10-L2 fusion in june  Was referred to pain management on 8/30. Has appointment 11/22- this was the first available   Has been off the oxycodone since he saw Dr Akins when he started him on   Mobic. Patient reports this is not helping. He asks if he can take more. He reports the 750 mg of Robaxin just made him sleepy. He is willing to try a lower dose. He then asks about driving while taking muscle relaxants.     Recommendation given: we will not restart oxycodone. Increase Mobic? Vs restart Robaxin at lower dose. Advised to not drive while taking medications that historically have made him sleepy. Argues that he can drive if taking a lower dose, reminded him that we don't know how it will affect him.     Action needed from provider: medication rec's        "

## 2022-09-27 NOTE — TELEPHONE ENCOUNTER
Patient would like a call back to discuss options for stronger pain medication than what he currently has. Please call him back at [phone]. Thank you~

## 2022-09-28 RX ORDER — METHOCARBAMOL 500 MG/1
500 TABLET, FILM COATED ORAL 3 TIMES DAILY
Qty: 30 TABLET | Refills: 0 | Status: SHIPPED | OUTPATIENT
Start: 2022-09-28 | End: 2022-10-18

## 2022-09-28 NOTE — TELEPHONE ENCOUNTER
Per Dr. Tashi camargo to go to 7.5 mg BID for Mobic but if it is not helping after that, would not continue. Can do robaxin 500 mg Q8h PRN.    Called patient to provide update.     Refill of robaxin sent to patient's pharmacy with new dose. Advised patient to contact clinic when running low on Mobic so new script can be sent with updated instructions.     Patient voiced agreement and understanding.

## 2022-10-06 ENCOUNTER — TELEPHONE (OUTPATIENT)
Dept: NEUROSURGERY | Facility: CLINIC | Age: 62
End: 2022-10-06

## 2022-10-06 DIAGNOSIS — Z98.1 HX OF SPINAL FUSION: Primary | ICD-10-CM

## 2022-10-06 DIAGNOSIS — S22.081A T12 BURST FRACTURE (H): ICD-10-CM

## 2022-10-06 RX ORDER — MELOXICAM 7.5 MG/1
7.5 TABLET ORAL DAILY
Qty: 60 TABLET | Refills: 1 | Status: SHIPPED | OUTPATIENT
Start: 2022-10-06 | End: 2022-10-10 | Stop reason: DRUGHIGH

## 2022-10-10 ENCOUNTER — TELEPHONE (OUTPATIENT)
Dept: NEUROSURGERY | Facility: CLINIC | Age: 62
End: 2022-10-10

## 2022-10-10 DIAGNOSIS — Z98.1 HX OF SPINAL FUSION: ICD-10-CM

## 2022-10-10 RX ORDER — MELOXICAM 7.5 MG/1
7.5 TABLET ORAL 2 TIMES DAILY
Qty: 60 TABLET | Refills: 1 | Status: ON HOLD | OUTPATIENT
Start: 2022-10-10 | End: 2023-08-29

## 2022-10-10 NOTE — TELEPHONE ENCOUNTER
Updated prescription to BID and called pharmacy.   They are processing it now.   Patient notified.

## 2022-10-10 NOTE — TELEPHONE ENCOUNTER
Patient needs a call back because the prescription for Meloxicam was filled at once per day when it should be enough for twice a day and is now having problems with insurance allowing the refills. Please call him back at [phone]. Thank you~

## 2022-10-18 DIAGNOSIS — Z98.890 S/P SPINAL SURGERY: ICD-10-CM

## 2022-10-18 RX ORDER — METHOCARBAMOL 500 MG/1
500 TABLET, FILM COATED ORAL 3 TIMES DAILY
Qty: 30 TABLET | Refills: 0 | Status: SHIPPED | OUTPATIENT
Start: 2022-10-18 | End: 2023-10-02

## 2022-10-18 NOTE — TELEPHONE ENCOUNTER
Calling for Robaxin     DOS: 6/13/22  Procedure: THORACIC 10 LUMBAR 2, POSTERIOR INSTRUMENTED FUSION, THORACIC 12 LAMINECTOMY  Surgeon: Dr. Tashi Roth Post Op: 18 weeks     Current symptom(s): Middle low back 4/10. Denies new numbness/tingling/weakness. But reports he is having to do more vacuuming. Encouraged to take breaks or find a different task.   Current pain management:   Mobic: BID prn  Robaxin - smaller dose effective and does not make him sleepy  Ibuprofen - PRN  Ice - PRN, 'feels good'     Last fill: 9/2822 #30  Next visit: na     Medication pended for your approval, if appropriate. Pharmacy verified.      Any patient questions or concerns

## 2022-10-18 NOTE — TELEPHONE ENCOUNTER
If patient is needing this on a daily basis, refer to pain management  Called patient to update.     He is continuing to need this daily  He has appointment with pain management on 11/22 as first available.   I informed him that they will take over any medication refills going forward

## 2022-10-18 NOTE — TELEPHONE ENCOUNTER
Patient called to request refill of Methocarbamol. Please call him back when confirmed. Thank you~

## 2022-10-31 NOTE — PROGRESS NOTES
[facility], [place]     Neurosurgery Progress Note:  06/16/2022      Interval History:  Dressing saturated yesterday, some leaking around hemovac site. Dressing replaced. Pain well controlled. Having bowel movements. Upright XR obtained, adequate hardware placement.  Insulin sliding scale changes to high resistance to maintain BG <180    Assessment:  62-year-old male with T12 burst fracture.  Intact on exam.  Burst component appears to involve greater than 50% of spinal canal.  Will need MRI to better visualize degree of compression however will likely require decompression and fusion in this admission given the unstable nature.  He was fit with TLSO and underwent upright XR, demonstrating stable spinal alignment while upright. He went to the OR on 6/13 for T10-L2 posterior fusion with T12 laminectomy.     Clinically Significant Risk Factors Present on Admission                 Plan:  Regular diet  Pain control  Bowel regimen  Hemovac to full suction - will remove today  Maintain BG <180 for optimal wound healing  TLSO when OOB  PT/OT  SCDs and subcutaneous heparin for  DVT prophylaxis      -----------------------------------  Fatimah Hurst MD  Neurosurgery PGY2    Please contact neurosurgery resident on call with questions.    Dial * * [phone], enter [phone] when prompted.  -----------------------------------      General: Nonacute distress, appearing stated age  HEENT: Atraumatic, normocephalic  PULM: Breathing comfortably on room air  Wound: Dressing in place, minimal strikethrough, hemovac in place  NEUROLOGIC:  -- Awake; Alert; oriented x 3  -- Follows commands briskly  -- +repetition, calculation, and naming  -- Speech fluent, spontaneous. No aphasia or dysarthria.  -- no gaze preference. No apparent hemineglect.  Cranial Nerves:  -- visual fields full to confrontation, PERRL 3-2mm bilat and brisk, extraocular movements intact  -- face symmetrical, tongue midline  -- sensory V1-V3  intact bilaterally  -- palate elevates symmetrically, uvula midline  -- hearing grossly intact bilat  -- Trapezii 5/5 strength bilat symmetric  -- Cerebellar: Finger nose finger without dysmetria, intact rapid alternating motions bilaterally     Motor:  Normal bulk / tone; no tremor, rigidity, or bradykinesia.  No muscle wasting or fasciculations  No Pronator Drift       Delt Bi Tri Hand Flexion/  Extension Iliopsoas Quadriceps Hamstrings Tibialis Anterior Gastroc     C5 C6 C7 C8/T1 L2 L3 L4-S1 L4 S1   R 5 5 5 5 5 5 5 5 5   L 5 5 5 5 5 5 5 5 5      Sensory:  intact to LT x 4 extremities        Reflexes: no clonus       Bi Tri BR Jarad Pat Ach Bab     C5-6 C7-8 C6 UMN L2-4 S1 UMN   R 2+ 2+ 2+ Norm 2+ 2+ Norm   L 2+ 2+ 2+ Norm 2+ 2+ Norm      Gait: deferred     Objective:   Temp:  [97.4  F (36.3  C)-99.1  F (37.3  C)] 99.1  F (37.3  C)  Pulse:  [] 74  Resp:  [16-22] 18  BP: (100-129)/(65-84) 109/65  SpO2:  [94 %-97 %] 95 %  I/O last 3 completed shifts:  In: 680 [P.O.:680]  Out: 1060 [Urine:920; Drains:140]        LABS:  Recent Labs   Lab 06/16/22  0044 06/15/22  2046 06/15/22  1658 06/15/22  1348 06/15/22  0837 06/15/22  0610 06/14/22  0757 06/14/22  0543 06/13/22  1125 06/13/22  0723   NA  --   --   --   --   --  137  --  140  --  139   POTASSIUM  --   --   --  3.8  --  3.4  --  4.2  --  3.6   CHLORIDE  --   --   --   --   --  101  --  106  --  106   CO2  --   --   --   --   --  26  --  27  --  26   ANIONGAP  --   --   --   --   --  10  --  7  --  7   * 152* 193*  --    < > 145*   < > 157*   < > 203*   BUN  --   --   --   --   --  18  --  12  --  11   CR  --   --   --   --   --  0.89  --  0.74  --  0.66   MARGE  --   --   --   --   --  8.3*  --  8.2*  --  8.8    < > = values in this interval not displayed.       Recent Labs   Lab 06/15/22  0610   WBC 10.4   RBC 3.35*   HGB 11.5*   HCT 34.6*   *   MCH 34.3*   MCHC 33.2   RDW 12.0          IMAGING:  Recent Results (from the past 24 hour(s))   XR  Spine Complete Scoliosis 2 Views    Narrative    Exam: XR SPINE COMPLETE SCOLIOSIS 2 VW, 6/15/2022 3:01 PM    Indication: Post op instrumentation    Comparison: MRI thoracolumbar spine 6/10/2022    Findings: Multiple AP and lateral radiographs of the thoracolumbar  spine. Postprocedural changes of posterior fusion extending from T10  through L2, no evidence of hardware failure. Chronic compression  deformity of T12. Overall alignment of the thoracolumbar spine is  within normal limits, with the exception of mildly exaggerated  kyphotic curvature of the thoracic spine. No acute osseous  abnormalities.    No focal airspace consolidation. Heart size within normal limits.  Diffuse gaseous dilation of the small and large bowel. No portal  venous gas.       Impression    Impression:   1. Postoperative changes of posterior fusion of T10-L2. No evidence of  hardware failure.  2. Chronic compression deformity of T12.  3. Diffuse gaseous distention of the small and large bowel, likely  postoperative ileus.    CHRISSY LUCIA MD         SYSTEM ID:  [id number]          pending

## 2022-12-14 ENCOUNTER — ANCILLARY PROCEDURE (OUTPATIENT)
Dept: GENERAL RADIOLOGY | Facility: CLINIC | Age: 62
End: 2022-12-14
Attending: STUDENT IN AN ORGANIZED HEALTH CARE EDUCATION/TRAINING PROGRAM
Payer: COMMERCIAL

## 2022-12-14 ENCOUNTER — OFFICE VISIT (OUTPATIENT)
Dept: NEUROSURGERY | Facility: CLINIC | Age: 62
End: 2022-12-14
Attending: STUDENT IN AN ORGANIZED HEALTH CARE EDUCATION/TRAINING PROGRAM
Payer: COMMERCIAL

## 2022-12-14 VITALS — HEART RATE: 107 BPM | DIASTOLIC BLOOD PRESSURE: 86 MMHG | SYSTOLIC BLOOD PRESSURE: 159 MMHG | OXYGEN SATURATION: 96 %

## 2022-12-14 DIAGNOSIS — S22.081A T12 BURST FRACTURE (H): ICD-10-CM

## 2022-12-14 DIAGNOSIS — S22.081A T12 BURST FRACTURE (H): Primary | ICD-10-CM

## 2022-12-14 PROCEDURE — G0463 HOSPITAL OUTPT CLINIC VISIT: HCPCS

## 2022-12-14 PROCEDURE — 99213 OFFICE O/P EST LOW 20 MIN: CPT | Performed by: STUDENT IN AN ORGANIZED HEALTH CARE EDUCATION/TRAINING PROGRAM

## 2022-12-14 PROCEDURE — 72080 X-RAY EXAM THORACOLMB 2/> VW: CPT | Mod: TC | Performed by: RADIOLOGY

## 2022-12-14 PROCEDURE — 99212 OFFICE O/P EST SF 10 MIN: CPT | Performed by: STUDENT IN AN ORGANIZED HEALTH CARE EDUCATION/TRAINING PROGRAM

## 2022-12-14 ASSESSMENT — PAIN SCALES - GENERAL: PAINLEVEL: NO PAIN (0)

## 2022-12-14 NOTE — LETTER
"    12/14/2022         RE: Kalen Callejas  [address] [facility] [address]        Dear Colleague,    Thank you for referring your patient, Kalen Callejas, to the [facility] [place]. Please see a copy of my visit note below.    HPI:  62-year-old male status post T10-L2 posterior fusion and decompression for a T12 burst fracture.  Postoperatively he struggled with mid back pain without any radiation to his legs.  Over the last few months this is significantly improved.  He takes aspirin in the morning and has been more active walking more each day which is significantly improved his symptoms.  He denies any continued pain at this time.  Current Outpatient Medications   Medication     acetaminophen (TYLENOL) 325 MG tablet     albuterol (PROAIR HFA/PROVENTIL HFA/VENTOLIN HFA) 108 (90 Base) MCG/ACT inhaler     azelastine (ASTEPRO) 0.15 % nasal spray     budesonide (PULMICORT FLEXHALER) 180 MCG/ACT inhaler     fluticasone (FLONASE) 50 MCG/ACT nasal spray     gabapentin (NEURONTIN) 300 MG capsule     lisinopril (ZESTRIL) 20 MG tablet     meloxicam (MOBIC) 7.5 MG tablet     methocarbamol (ROBAXIN) 500 MG tablet     methocarbamol (ROBAXIN) 750 MG tablet     oxyCODONE IR (ROXICODONE) 10 MG tablet     No current facility-administered medications for this visit.      Physical Exam:  Vital signs:                         Estimated body mass index is 37.32 kg/m  as calculated from the following:    Height as of 6/9/22: 1.892 m (6' 2.5\").    Weight as of 6/15/22: 133.6 kg (294 lb 9.6 oz).   Is full-strength in his bilateral lower extremities.  Sensation is intact light touch throughout.  Gait is normal.  Results Reviewed:  I personally viewed the images of x-rays from today showing stable hardware without any increased kyphosis.  No increased fracture at T12.  No adjacent segment fractures noted.  Assessment:  62-year-old male status post T10-L2 posterior fusion for a T12 burst " fracture  Plan:  Okay to advance activity as tolerated without any formal restrictions going forward.  I will have him follow-up with us in 6 months for a 1 year follow-up with x-rays at that time.    Sulaiman Akins MD      Again, thank you for allowing me to participate in the care of your patient.        Sincerely,        Sulaiman Akins MD

## 2022-12-14 NOTE — PATIENT INSTRUCTIONS
Patient Next Steps:      Dr. Akins would like you to come back in 6 months to see Dada Acosta PA-C or Fabi Marquez PA-C in clinic. He would like you to have XR done prior.       Please call us if you have any further questions or concerns.    [facility]   Phone: [phone]  Fax: [phone]

## 2022-12-14 NOTE — PROGRESS NOTES
"HPI:  62-year-old male status post T10-L2 posterior fusion and decompression for a T12 burst fracture.  Postoperatively he struggled with mid back pain without any radiation to his legs.  Over the last few months this is significantly improved.  He takes aspirin in the morning and has been more active walking more each day which is significantly improved his symptoms.  He denies any continued pain at this time.  Current Outpatient Medications   Medication     acetaminophen (TYLENOL) 325 MG tablet     albuterol (PROAIR HFA/PROVENTIL HFA/VENTOLIN HFA) 108 (90 Base) MCG/ACT inhaler     azelastine (ASTEPRO) 0.15 % nasal spray     budesonide (PULMICORT FLEXHALER) 180 MCG/ACT inhaler     fluticasone (FLONASE) 50 MCG/ACT nasal spray     gabapentin (NEURONTIN) 300 MG capsule     lisinopril (ZESTRIL) 20 MG tablet     meloxicam (MOBIC) 7.5 MG tablet     methocarbamol (ROBAXIN) 500 MG tablet     methocarbamol (ROBAXIN) 750 MG tablet     oxyCODONE IR (ROXICODONE) 10 MG tablet     No current facility-administered medications for this visit.      Physical Exam:  Vital signs:                         Estimated body mass index is 37.32 kg/m  as calculated from the following:    Height as of 6/9/22: 1.892 m (6' 2.5\").    Weight as of 6/15/22: 133.6 kg (294 lb 9.6 oz).   Is full-strength in his bilateral lower extremities.  Sensation is intact light touch throughout.  Gait is normal.  Results Reviewed:  I personally viewed the images of x-rays from today showing stable hardware without any increased kyphosis.  No increased fracture at T12.  No adjacent segment fractures noted.  Assessment:  62-year-old male status post T10-L2 posterior fusion for a T12 burst fracture  Plan:  Okay to advance activity as tolerated without any formal restrictions going forward.  I will have him follow-up with us in 6 months for a 1 year follow-up with x-rays at that time.    Sulaiman Akins MD  "

## 2023-02-06 ENCOUNTER — TELEPHONE (OUTPATIENT)
Dept: NEUROSURGERY | Facility: CLINIC | Age: 63
End: 2023-02-06
Payer: COMMERCIAL

## 2023-02-06 NOTE — TELEPHONE ENCOUNTER
Patient called with a question regarding activities (golf) they would like to resume.  Please reach out to patient at [phone].

## 2023-02-06 NOTE — TELEPHONE ENCOUNTER
Patient status post T10-L2 posterior fusion and decompression for a T12 burst fracture on 6/13/22.   Called inquiring if it is ok to golf at this point. Patient has mild to no pain. Only taking aspirin in the morning.

## 2023-08-25 ENCOUNTER — HOSPITAL ENCOUNTER (INPATIENT)
Facility: CLINIC | Age: 63
LOS: 4 days | Discharge: HOME | End: 2023-08-29
Attending: EMERGENCY MEDICINE | Admitting: INTERNAL MEDICINE
Payer: COMMERCIAL

## 2023-08-25 ENCOUNTER — APPOINTMENT (OUTPATIENT)
Dept: CARDIOLOGY | Facility: CLINIC | Age: 63
End: 2023-08-25
Attending: INTERNAL MEDICINE
Payer: COMMERCIAL

## 2023-08-25 ENCOUNTER — APPOINTMENT (OUTPATIENT)
Dept: CT IMAGING | Facility: CLINIC | Age: 63
End: 2023-08-25
Attending: EMERGENCY MEDICINE
Payer: COMMERCIAL

## 2023-08-25 DIAGNOSIS — E11.10 DIABETIC KETOACIDOSIS WITHOUT COMA ASSOCIATED WITH TYPE 2 DIABETES MELLITUS (H): ICD-10-CM

## 2023-08-25 DIAGNOSIS — I10 BENIGN ESSENTIAL HYPERTENSION: Primary | ICD-10-CM

## 2023-08-25 DIAGNOSIS — R65.20 SEVERE SEPSIS (H): ICD-10-CM

## 2023-08-25 DIAGNOSIS — A41.9 SEVERE SEPSIS (H): ICD-10-CM

## 2023-08-25 DIAGNOSIS — K20.90 ESOPHAGITIS: ICD-10-CM

## 2023-08-25 DIAGNOSIS — E27.9 ADRENAL NODULE (H): ICD-10-CM

## 2023-08-25 LAB
ALBUMIN SERPL BCG-MCNC: 3.9 G/DL (ref 3.5–5.2)
ALBUMIN SERPL BCG-MCNC: 4.6 G/DL (ref 3.5–5.2)
ALBUMIN UR-MCNC: 100 MG/DL
ALLEN'S TEST: YES
ALP SERPL-CCNC: 139 U/L (ref 40–129)
ALP SERPL-CCNC: 175 U/L (ref 40–129)
ALT SERPL W P-5'-P-CCNC: 78 U/L (ref 0–70)
ALT SERPL W P-5'-P-CCNC: 92 U/L (ref 0–70)
ANION GAP BLD CALCULATED.3IONS-SCNC: 23 MMOL/L (ref 3–14)
ANION GAP BLD CALCULATED.3IONS-SCNC: <1 MMOL/L (ref 3–14)
ANION GAP BLD CALCULATED.3IONS-SCNC: <1 MMOL/L (ref 3–14)
ANION GAP SERPL CALCULATED.3IONS-SCNC: 15 MMOL/L (ref 7–15)
ANION GAP SERPL CALCULATED.3IONS-SCNC: 16 MMOL/L (ref 7–15)
ANION GAP SERPL CALCULATED.3IONS-SCNC: 21 MMOL/L (ref 7–15)
ANION GAP SERPL CALCULATED.3IONS-SCNC: 26 MMOL/L (ref 7–15)
ANION GAP SERPL CALCULATED.3IONS-SCNC: 32 MMOL/L (ref 7–15)
ANION GAP SERPL CALCULATED.3IONS-SCNC: 38 MMOL/L (ref 7–15)
APPEARANCE UR: CLEAR
AST SERPL W P-5'-P-CCNC: 45 U/L (ref 0–45)
AST SERPL W P-5'-P-CCNC: 52 U/L (ref 0–45)
ATRIAL RATE - MUSE: 151 BPM
B-OH-BUTYR SERPL-SCNC: 5.3 MMOL/L
B-OH-BUTYR SERPL-SCNC: 6.9 MMOL/L
B-OH-BUTYR SERPL-SCNC: 7.6 MMOL/L
B-OH-BUTYR SERPL-SCNC: 8.5 MMOL/L
B-OH-BUTYR SERPL-SCNC: >9 MMOL/L
BASE EXCESS BLDA CALC-SCNC: -27.5 MMOL/L (ref -9–1.8)
BASE EXCESS BLDV CALC-SCNC: -12.1 MMOL/L (ref -7.7–1.9)
BASE EXCESS BLDV CALC-SCNC: -18 MMOL/L (ref -7.7–1.9)
BASE EXCESS BLDV CALC-SCNC: -23.6 MMOL/L (ref -7.7–1.9)
BASE EXCESS BLDV CALC-SCNC: -29.1 MMOL/L (ref -7.7–1.9)
BASE EXCESS BLDV CALC-SCNC: -9.8 MMOL/L (ref -7.7–1.9)
BASOPHILS # BLD MANUAL: 0 10E3/UL (ref 0–0.2)
BASOPHILS # BLD MANUAL: 0 10E3/UL (ref 0–0.2)
BASOPHILS NFR BLD MANUAL: 0 %
BASOPHILS NFR BLD MANUAL: 0 %
BILIRUB SERPL-MCNC: 0.7 MG/DL
BILIRUB SERPL-MCNC: 0.8 MG/DL
BILIRUB UR QL STRIP: NEGATIVE
BUN SERPL-MCNC: 23 MG/DL (ref 7–30)
BUN SERPL-MCNC: 23.8 MG/DL (ref 8–23)
BUN SERPL-MCNC: 24.6 MG/DL (ref 8–23)
BUN SERPL-MCNC: 25.2 MG/DL (ref 8–23)
BUN SERPL-MCNC: 25.8 MG/DL (ref 8–23)
BUN SERPL-MCNC: 26.6 MG/DL (ref 8–23)
BUN SERPL-MCNC: 26.7 MG/DL (ref 8–23)
BUN SERPL-MCNC: 43 MG/DL (ref 7–30)
BUN SERPL-MCNC: 49 MG/DL (ref 7–30)
CA-I BLD-MCNC: 4.3 MG/DL (ref 4.4–5.2)
CA-I BLD-MCNC: 4.3 MG/DL (ref 4.4–5.2)
CA-I BLD-MCNC: 4.7 MG/DL (ref 4.4–5.2)
CALCIUM SERPL-MCNC: 10.3 MG/DL (ref 8.8–10.2)
CALCIUM SERPL-MCNC: 8.4 MG/DL (ref 8.8–10.2)
CALCIUM SERPL-MCNC: 8.5 MG/DL (ref 8.8–10.2)
CALCIUM SERPL-MCNC: 8.5 MG/DL (ref 8.8–10.2)
CALCIUM SERPL-MCNC: 8.6 MG/DL (ref 8.8–10.2)
CALCIUM SERPL-MCNC: 9.1 MG/DL (ref 8.8–10.2)
CHLORIDE BLD-SCNC: 117 MMOL/L (ref 94–109)
CHLORIDE BLD-SCNC: 118 MMOL/L (ref 94–109)
CHLORIDE BLD-SCNC: 118 MMOL/L (ref 94–109)
CHLORIDE SERPL-SCNC: 104 MMOL/L (ref 98–107)
CHLORIDE SERPL-SCNC: 110 MMOL/L (ref 98–107)
CHLORIDE SERPL-SCNC: 110 MMOL/L (ref 98–107)
CHLORIDE SERPL-SCNC: 113 MMOL/L (ref 98–107)
CHLORIDE SERPL-SCNC: 113 MMOL/L (ref 98–107)
CHLORIDE SERPL-SCNC: 96 MMOL/L (ref 98–107)
CO2 BLD-SCNC: 6 MMOL/L (ref 20–32)
CO2 BLD-SCNC: 7 MMOL/L (ref 20–32)
CO2 BLD-SCNC: 9 MMOL/L (ref 20–32)
COLOR UR AUTO: ABNORMAL
CREAT BLD-MCNC: 0.9 MG/DL (ref 0.7–1.3)
CREAT BLD-MCNC: 0.9 MG/DL (ref 0.7–1.3)
CREAT BLD-MCNC: 1.4 MG/DL (ref 0.7–1.3)
CREAT BLD-MCNC: 1.5 MG/DL (ref 0.7–1.3)
CREAT SERPL-MCNC: 1.02 MG/DL (ref 0.67–1.17)
CREAT SERPL-MCNC: 1.03 MG/DL (ref 0.67–1.17)
CREAT SERPL-MCNC: 1.07 MG/DL (ref 0.67–1.17)
CREAT SERPL-MCNC: 1.07 MG/DL (ref 0.67–1.17)
CREAT SERPL-MCNC: 1.1 MG/DL (ref 0.67–1.17)
CREAT SERPL-MCNC: 1.33 MG/DL (ref 0.67–1.17)
DEPRECATED HCO3 PLAS-SCNC: 10 MMOL/L (ref 22–29)
DEPRECATED HCO3 PLAS-SCNC: 13 MMOL/L (ref 22–29)
DEPRECATED HCO3 PLAS-SCNC: 15 MMOL/L (ref 22–29)
DEPRECATED HCO3 PLAS-SCNC: 4 MMOL/L (ref 22–29)
DEPRECATED HCO3 PLAS-SCNC: 4 MMOL/L (ref 22–29)
DEPRECATED HCO3 PLAS-SCNC: 6 MMOL/L (ref 22–29)
DIASTOLIC BLOOD PRESSURE - MUSE: NORMAL MMHG
EOSINOPHIL # BLD MANUAL: 0 10E3/UL (ref 0–0.7)
EOSINOPHIL # BLD MANUAL: 0 10E3/UL (ref 0–0.7)
EOSINOPHIL NFR BLD MANUAL: 0 %
EOSINOPHIL NFR BLD MANUAL: 0 %
ERYTHROCYTE [DISTWIDTH] IN BLOOD BY AUTOMATED COUNT: 12.1 % (ref 10–15)
ERYTHROCYTE [DISTWIDTH] IN BLOOD BY AUTOMATED COUNT: 12.1 % (ref 10–15)
FLUAV RNA SPEC QL NAA+PROBE: NEGATIVE
FLUBV RNA RESP QL NAA+PROBE: NEGATIVE
GFR SERPL CREATININE-BSD FRML MDRD: 56 ML/MIN/1.73M2
GFR SERPL CREATININE-BSD FRML MDRD: 60 ML/MIN/1.73M2
GFR SERPL CREATININE-BSD FRML MDRD: 75 ML/MIN/1.73M2
GFR SERPL CREATININE-BSD FRML MDRD: 77 ML/MIN/1.73M2
GFR SERPL CREATININE-BSD FRML MDRD: 77 ML/MIN/1.73M2
GFR SERPL CREATININE-BSD FRML MDRD: 81 ML/MIN/1.73M2
GFR SERPL CREATININE-BSD FRML MDRD: 82 ML/MIN/1.73M2
GLUCOSE BLD-MCNC: 440 MG/DL (ref 70–99)
GLUCOSE BLD-MCNC: 463 MG/DL (ref 70–99)
GLUCOSE BLD-MCNC: 519 MG/DL (ref 70–99)
GLUCOSE BLDC GLUCOMTR-MCNC: 101 MG/DL (ref 70–99)
GLUCOSE BLDC GLUCOMTR-MCNC: 109 MG/DL (ref 70–99)
GLUCOSE BLDC GLUCOMTR-MCNC: 142 MG/DL (ref 70–99)
GLUCOSE BLDC GLUCOMTR-MCNC: 144 MG/DL (ref 70–99)
GLUCOSE BLDC GLUCOMTR-MCNC: 146 MG/DL (ref 70–99)
GLUCOSE BLDC GLUCOMTR-MCNC: 149 MG/DL (ref 70–99)
GLUCOSE BLDC GLUCOMTR-MCNC: 161 MG/DL (ref 70–99)
GLUCOSE BLDC GLUCOMTR-MCNC: 168 MG/DL (ref 70–99)
GLUCOSE BLDC GLUCOMTR-MCNC: 169 MG/DL (ref 70–99)
GLUCOSE BLDC GLUCOMTR-MCNC: 182 MG/DL (ref 70–99)
GLUCOSE BLDC GLUCOMTR-MCNC: 187 MG/DL (ref 70–99)
GLUCOSE BLDC GLUCOMTR-MCNC: 190 MG/DL (ref 70–99)
GLUCOSE BLDC GLUCOMTR-MCNC: 192 MG/DL (ref 70–99)
GLUCOSE BLDC GLUCOMTR-MCNC: 201 MG/DL (ref 70–99)
GLUCOSE BLDC GLUCOMTR-MCNC: 203 MG/DL (ref 70–99)
GLUCOSE BLDC GLUCOMTR-MCNC: 219 MG/DL (ref 70–99)
GLUCOSE BLDC GLUCOMTR-MCNC: 303 MG/DL (ref 70–99)
GLUCOSE BLDC GLUCOMTR-MCNC: 349 MG/DL (ref 70–99)
GLUCOSE BLDC GLUCOMTR-MCNC: 388 MG/DL (ref 70–99)
GLUCOSE BLDC GLUCOMTR-MCNC: 404 MG/DL (ref 70–99)
GLUCOSE BLDC GLUCOMTR-MCNC: 447 MG/DL (ref 70–99)
GLUCOSE BLDC GLUCOMTR-MCNC: 484 MG/DL (ref 70–99)
GLUCOSE BLDC GLUCOMTR-MCNC: 81 MG/DL (ref 70–99)
GLUCOSE SERPL-MCNC: 113 MG/DL (ref 70–99)
GLUCOSE SERPL-MCNC: 155 MG/DL (ref 70–99)
GLUCOSE SERPL-MCNC: 171 MG/DL (ref 70–99)
GLUCOSE SERPL-MCNC: 183 MG/DL (ref 70–99)
GLUCOSE SERPL-MCNC: 333 MG/DL (ref 70–99)
GLUCOSE SERPL-MCNC: 517 MG/DL (ref 70–99)
GLUCOSE UR STRIP-MCNC: >=1000 MG/DL
GRANULAR CAST: 15 /LPF
HBA1C MFR BLD: 10.5 %
HCO3 BLD-SCNC: 3 MMOL/L (ref 21–28)
HCO3 BLDV-SCNC: 11 MMOL/L (ref 21–28)
HCO3 BLDV-SCNC: 13 MMOL/L (ref 21–28)
HCO3 BLDV-SCNC: 16 MMOL/L (ref 21–28)
HCO3 BLDV-SCNC: 3 MMOL/L (ref 21–28)
HCO3 BLDV-SCNC: 5 MMOL/L (ref 21–28)
HCO3 BLDV-SCNC: 5 MMOL/L (ref 21–28)
HCO3 BLDV-SCNC: 6 MMOL/L (ref 21–28)
HCT VFR BLD AUTO: 49.5 % (ref 40–53)
HCT VFR BLD AUTO: 54.5 % (ref 40–53)
HCT VFR BLD CALC: 42 % (ref 40–53)
HCT VFR BLD CALC: 45 % (ref 40–53)
HCT VFR BLD CALC: 54 % (ref 40–53)
HGB BLD-MCNC: 14.3 G/DL (ref 13.3–17.7)
HGB BLD-MCNC: 15.3 G/DL (ref 13.3–17.7)
HGB BLD-MCNC: 16.3 G/DL (ref 13.3–17.7)
HGB BLD-MCNC: 17.5 G/DL (ref 13.3–17.7)
HGB BLD-MCNC: 18.4 G/DL (ref 13.3–17.7)
HGB UR QL STRIP: ABNORMAL
HOLD SPECIMEN: NORMAL
HOLD SPECIMEN: NORMAL
HYALINE CASTS: 6 /LPF
INTERPRETATION ECG - MUSE: NORMAL
KETONES UR STRIP-MCNC: >150 MG/DL
LACTATE BLD-SCNC: 3.5 MMOL/L
LACTATE BLD-SCNC: 5.6 MMOL/L
LACTATE SERPL-SCNC: 3.3 MMOL/L (ref 0.7–2)
LACTATE SERPL-SCNC: 6.1 MMOL/L (ref 0.7–2)
LEUKOCYTE ESTERASE UR QL STRIP: NEGATIVE
LIPASE SERPL-CCNC: 28 U/L (ref 13–60)
LVEF ECHO: NORMAL
LYMPHOCYTES # BLD MANUAL: 0.8 10E3/UL (ref 0.8–5.3)
LYMPHOCYTES # BLD MANUAL: 2.9 10E3/UL (ref 0.8–5.3)
LYMPHOCYTES NFR BLD MANUAL: 15 %
LYMPHOCYTES NFR BLD MANUAL: 4 %
MAGNESIUM SERPL-MCNC: 2.3 MG/DL (ref 1.7–2.3)
MAGNESIUM SERPL-MCNC: 2.8 MG/DL (ref 1.7–2.3)
MCH RBC QN AUTO: 34.6 PG (ref 26.5–33)
MCH RBC QN AUTO: 34.8 PG (ref 26.5–33)
MCHC RBC AUTO-ENTMCNC: 32.1 G/DL (ref 31.5–36.5)
MCHC RBC AUTO-ENTMCNC: 32.9 G/DL (ref 31.5–36.5)
MCV RBC AUTO: 106 FL (ref 78–100)
MCV RBC AUTO: 108 FL (ref 78–100)
MONOCYTES # BLD MANUAL: 1.2 10E3/UL (ref 0–1.3)
MONOCYTES # BLD MANUAL: 1.4 10E3/UL (ref 0–1.3)
MONOCYTES NFR BLD MANUAL: 6 %
MONOCYTES NFR BLD MANUAL: 7 %
MUCOUS THREADS #/AREA URNS LPF: PRESENT /LPF
MYELOCYTES # BLD MANUAL: 0.2 10E3/UL
MYELOCYTES NFR BLD MANUAL: 1 %
NEUTROPHILS # BLD MANUAL: 15.1 10E3/UL (ref 1.6–8.3)
NEUTROPHILS # BLD MANUAL: 18.4 10E3/UL (ref 1.6–8.3)
NEUTROPHILS NFR BLD MANUAL: 78 %
NEUTROPHILS NFR BLD MANUAL: 89 %
NITRATE UR QL: NEGATIVE
O2/TOTAL GAS SETTING VFR VENT: 0 %
O2/TOTAL GAS SETTING VFR VENT: 21 %
OSMOLALITY SERPL: 328 MMOL/KG (ref 280–301)
OSMOLALITY SERPL: 345 MMOL/KG (ref 280–301)
OXYHGB MFR BLDV: 58 % (ref 70–75)
P AXIS - MUSE: 56 DEGREES
PCO2 BLD: 11 MM HG (ref 35–45)
PCO2 BLDV: 16 MM HG (ref 40–50)
PCO2 BLDV: 23 MM HG (ref 40–50)
PCO2 BLDV: 25 MM HG (ref 40–50)
PCO2 BLDV: 28 MM HG (ref 40–50)
PCO2 BLDV: 29 MM HG (ref 40–50)
PCO2 BLDV: 34 MM HG (ref 40–50)
PCO2 BLDV: 34 MM HG (ref 40–50)
PH BLD: 6.97 [PH] (ref 7.35–7.45)
PH BLDV: 6.84 [PH] (ref 7.32–7.43)
PH BLDV: 6.89 [PH] (ref 7.32–7.43)
PH BLDV: 6.89 [PH] (ref 7.32–7.43)
PH BLDV: 7.02 [PH] (ref 7.32–7.43)
PH BLDV: 7.1 [PH] (ref 7.32–7.43)
PH BLDV: 7.27 [PH] (ref 7.32–7.43)
PH BLDV: 7.28 [PH] (ref 7.32–7.43)
PH UR STRIP: 5.5 [PH] (ref 5–7)
PHOSPHATE SERPL-MCNC: 3.1 MG/DL (ref 2.5–4.5)
PHOSPHATE SERPL-MCNC: 5.8 MG/DL (ref 2.5–4.5)
PLAT MORPH BLD: ABNORMAL
PLAT MORPH BLD: ABNORMAL
PLATELET # BLD AUTO: 397 10E3/UL (ref 150–450)
PLATELET # BLD AUTO: 486 10E3/UL (ref 150–450)
PO2 BLD: 147 MM HG (ref 80–105)
PO2 BLDV: 21 MM HG (ref 25–47)
PO2 BLDV: 29 MM HG (ref 25–47)
PO2 BLDV: 31 MM HG (ref 25–47)
PO2 BLDV: 39 MM HG (ref 25–47)
PO2 BLDV: 47 MM HG (ref 25–47)
PO2 BLDV: 49 MM HG (ref 25–47)
PO2 BLDV: 70 MM HG (ref 25–47)
POTASSIUM BLD-SCNC: 4 MMOL/L (ref 3.4–5.3)
POTASSIUM BLD-SCNC: >9 MMOL/L (ref 3.4–5.3)
POTASSIUM BLD-SCNC: >9 MMOL/L (ref 3.4–5.3)
POTASSIUM SERPL-SCNC: 3.2 MMOL/L (ref 3.4–5.3)
POTASSIUM SERPL-SCNC: 3.3 MMOL/L (ref 3.4–5.3)
POTASSIUM SERPL-SCNC: 3.7 MMOL/L (ref 3.4–5.3)
POTASSIUM SERPL-SCNC: 3.7 MMOL/L (ref 3.4–5.3)
POTASSIUM SERPL-SCNC: 4 MMOL/L (ref 3.4–5.3)
POTASSIUM SERPL-SCNC: 4.3 MMOL/L (ref 3.4–5.3)
PR INTERVAL - MUSE: 134 MS
PROCALCITONIN SERPL IA-MCNC: 0.21 NG/ML
PROT SERPL-MCNC: 7.6 G/DL (ref 6.4–8.3)
PROT SERPL-MCNC: 9 G/DL (ref 6.4–8.3)
QRS DURATION - MUSE: 80 MS
QT - MUSE: 262 MS
QTC - MUSE: 415 MS
R AXIS - MUSE: 2 DEGREES
RBC # BLD AUTO: 4.69 10E6/UL (ref 4.4–5.9)
RBC # BLD AUTO: 5.06 10E6/UL (ref 4.4–5.9)
RBC MORPH BLD: ABNORMAL
RBC MORPH BLD: ABNORMAL
RBC URINE: <1 /HPF
RSV RNA SPEC NAA+PROBE: NEGATIVE
SAO2 % BLDV: 57 % (ref 94–100)
SAO2 % BLDV: 78 % (ref 94–100)
SARS-COV-2 RNA RESP QL NAA+PROBE: NEGATIVE
SODIUM BLD-SCNC: 133 MMOL/L (ref 133–144)
SODIUM BLD-SCNC: 134 MMOL/L (ref 133–144)
SODIUM BLD-SCNC: 143 MMOL/L (ref 133–144)
SODIUM SERPL-SCNC: 138 MMOL/L (ref 136–145)
SODIUM SERPL-SCNC: 140 MMOL/L (ref 136–145)
SODIUM SERPL-SCNC: 141 MMOL/L (ref 136–145)
SODIUM SERPL-SCNC: 142 MMOL/L (ref 136–145)
SODIUM SERPL-SCNC: 142 MMOL/L (ref 136–145)
SODIUM SERPL-SCNC: 143 MMOL/L (ref 136–145)
SP GR UR STRIP: 1.02 (ref 1–1.03)
SQUAMOUS EPITHELIAL: 1 /HPF
SYSTOLIC BLOOD PRESSURE - MUSE: NORMAL MMHG
T AXIS - MUSE: 76 DEGREES
TROPONIN T SERPL HS-MCNC: 18 NG/L
TROPONIN T SERPL HS-MCNC: 20 NG/L
TROPONIN T SERPL HS-MCNC: 20 NG/L
TROPONIN T SERPL HS-MCNC: 22 NG/L
UROBILINOGEN UR STRIP-MCNC: NORMAL MG/DL
VENTRICULAR RATE- MUSE: 151 BPM
WBC # BLD AUTO: 19.3 10E3/UL (ref 4–11)
WBC # BLD AUTO: 20.7 10E3/UL (ref 4–11)
WBC URINE: <1 /HPF

## 2023-08-25 PROCEDURE — 83036 HEMOGLOBIN GLYCOSYLATED A1C: CPT | Performed by: EMERGENCY MEDICINE

## 2023-08-25 PROCEDURE — 250N000009 HC RX 250: Performed by: INTERNAL MEDICINE

## 2023-08-25 PROCEDURE — 83605 ASSAY OF LACTIC ACID: CPT

## 2023-08-25 PROCEDURE — 96375 TX/PRO/DX INJ NEW DRUG ADDON: CPT

## 2023-08-25 PROCEDURE — 96368 THER/DIAG CONCURRENT INF: CPT

## 2023-08-25 PROCEDURE — 99223 1ST HOSP IP/OBS HIGH 75: CPT | Performed by: INTERNAL MEDICINE

## 2023-08-25 PROCEDURE — 250N000011 HC RX IP 250 OP 636: Mod: JZ | Performed by: EMERGENCY MEDICINE

## 2023-08-25 PROCEDURE — 250N000009 HC RX 250: Performed by: EMERGENCY MEDICINE

## 2023-08-25 PROCEDURE — 82010 KETONE BODYS QUAN: CPT | Performed by: INTERNAL MEDICINE

## 2023-08-25 PROCEDURE — 36415 COLL VENOUS BLD VENIPUNCTURE: CPT | Performed by: INTERNAL MEDICINE

## 2023-08-25 PROCEDURE — 36415 COLL VENOUS BLD VENIPUNCTURE: CPT | Performed by: EMERGENCY MEDICINE

## 2023-08-25 PROCEDURE — 99292 CRITICAL CARE ADDL 30 MIN: CPT

## 2023-08-25 PROCEDURE — 87181 SC STD AGAR DILUTION PER AGT: CPT | Performed by: EMERGENCY MEDICINE

## 2023-08-25 PROCEDURE — 80048 BASIC METABOLIC PNL TOTAL CA: CPT | Performed by: INTERNAL MEDICINE

## 2023-08-25 PROCEDURE — 84100 ASSAY OF PHOSPHORUS: CPT | Performed by: INTERNAL MEDICINE

## 2023-08-25 PROCEDURE — 99291 CRITICAL CARE FIRST HOUR: CPT | Mod: 25

## 2023-08-25 PROCEDURE — 258N000003 HC RX IP 258 OP 636: Performed by: EMERGENCY MEDICINE

## 2023-08-25 PROCEDURE — 258N000003 HC RX IP 258 OP 636: Performed by: INTERNAL MEDICINE

## 2023-08-25 PROCEDURE — 74177 CT ABD & PELVIS W/CONTRAST: CPT

## 2023-08-25 PROCEDURE — 83605 ASSAY OF LACTIC ACID: CPT | Performed by: INTERNAL MEDICINE

## 2023-08-25 PROCEDURE — 82803 BLOOD GASES ANY COMBINATION: CPT

## 2023-08-25 PROCEDURE — 81001 URINALYSIS AUTO W/SCOPE: CPT | Performed by: EMERGENCY MEDICINE

## 2023-08-25 PROCEDURE — 83605 ASSAY OF LACTIC ACID: CPT | Performed by: EMERGENCY MEDICINE

## 2023-08-25 PROCEDURE — 85027 COMPLETE CBC AUTOMATED: CPT | Performed by: INTERNAL MEDICINE

## 2023-08-25 PROCEDURE — 99207 PR APP CREDIT; MD BILLING SHARED VISIT: CPT | Performed by: INTERNAL MEDICINE

## 2023-08-25 PROCEDURE — 82805 BLOOD GASES W/O2 SATURATION: CPT | Performed by: EMERGENCY MEDICINE

## 2023-08-25 PROCEDURE — 96366 THER/PROPH/DIAG IV INF ADDON: CPT

## 2023-08-25 PROCEDURE — 96365 THER/PROPH/DIAG IV INF INIT: CPT | Mod: 59

## 2023-08-25 PROCEDURE — 82565 ASSAY OF CREATININE: CPT

## 2023-08-25 PROCEDURE — 250N000013 HC RX MED GY IP 250 OP 250 PS 637: Performed by: INTERNAL MEDICINE

## 2023-08-25 PROCEDURE — 255N000002 HC RX 255 OP 636: Performed by: INTERNAL MEDICINE

## 2023-08-25 PROCEDURE — 83930 ASSAY OF BLOOD OSMOLALITY: CPT | Performed by: EMERGENCY MEDICINE

## 2023-08-25 PROCEDURE — 250N000011 HC RX IP 250 OP 636: Performed by: EMERGENCY MEDICINE

## 2023-08-25 PROCEDURE — 200N000001 HC R&B ICU

## 2023-08-25 PROCEDURE — 85007 BL SMEAR W/DIFF WBC COUNT: CPT | Performed by: EMERGENCY MEDICINE

## 2023-08-25 PROCEDURE — 80053 COMPREHEN METABOLIC PANEL: CPT | Performed by: EMERGENCY MEDICINE

## 2023-08-25 PROCEDURE — 82010 KETONE BODYS QUAN: CPT | Performed by: EMERGENCY MEDICINE

## 2023-08-25 PROCEDURE — 84100 ASSAY OF PHOSPHORUS: CPT | Performed by: EMERGENCY MEDICINE

## 2023-08-25 PROCEDURE — 250N000011 HC RX IP 250 OP 636: Mod: JZ

## 2023-08-25 PROCEDURE — 84155 ASSAY OF PROTEIN SERUM: CPT | Performed by: INTERNAL MEDICINE

## 2023-08-25 PROCEDURE — 84450 TRANSFERASE (AST) (SGOT): CPT | Performed by: INTERNAL MEDICINE

## 2023-08-25 PROCEDURE — 83735 ASSAY OF MAGNESIUM: CPT | Performed by: INTERNAL MEDICINE

## 2023-08-25 PROCEDURE — 83690 ASSAY OF LIPASE: CPT | Performed by: EMERGENCY MEDICINE

## 2023-08-25 PROCEDURE — 87637 SARSCOV2&INF A&B&RSV AMP PRB: CPT | Performed by: EMERGENCY MEDICINE

## 2023-08-25 PROCEDURE — 99291 CRITICAL CARE FIRST HOUR: CPT | Performed by: INTERNAL MEDICINE

## 2023-08-25 PROCEDURE — 82803 BLOOD GASES ANY COMBINATION: CPT | Performed by: INTERNAL MEDICINE

## 2023-08-25 PROCEDURE — 80047 BASIC METABLC PNL IONIZED CA: CPT

## 2023-08-25 PROCEDURE — 36600 WITHDRAWAL OF ARTERIAL BLOOD: CPT

## 2023-08-25 PROCEDURE — 87086 URINE CULTURE/COLONY COUNT: CPT | Performed by: EMERGENCY MEDICINE

## 2023-08-25 PROCEDURE — 80320 DRUG SCREEN QUANTALCOHOLS: CPT | Performed by: INTERNAL MEDICINE

## 2023-08-25 PROCEDURE — 84484 ASSAY OF TROPONIN QUANT: CPT | Performed by: EMERGENCY MEDICINE

## 2023-08-25 PROCEDURE — 86341 ISLET CELL ANTIBODY: CPT | Performed by: INTERNAL MEDICINE

## 2023-08-25 PROCEDURE — 999N000208 ECHOCARDIOGRAM COMPLETE

## 2023-08-25 PROCEDURE — 999N000157 HC STATISTIC RCP TIME EA 10 MIN

## 2023-08-25 PROCEDURE — 84484 ASSAY OF TROPONIN QUANT: CPT | Performed by: INTERNAL MEDICINE

## 2023-08-25 PROCEDURE — 87040 BLOOD CULTURE FOR BACTERIA: CPT | Performed by: EMERGENCY MEDICINE

## 2023-08-25 PROCEDURE — 82962 GLUCOSE BLOOD TEST: CPT

## 2023-08-25 PROCEDURE — 250N000011 HC RX IP 250 OP 636: Mod: JZ | Performed by: INTERNAL MEDICINE

## 2023-08-25 PROCEDURE — 93306 TTE W/DOPPLER COMPLETE: CPT | Mod: 26 | Performed by: INTERNAL MEDICINE

## 2023-08-25 PROCEDURE — 84145 PROCALCITONIN (PCT): CPT | Performed by: EMERGENCY MEDICINE

## 2023-08-25 PROCEDURE — 87149 DNA/RNA DIRECT PROBE: CPT | Performed by: EMERGENCY MEDICINE

## 2023-08-25 PROCEDURE — 83735 ASSAY OF MAGNESIUM: CPT | Performed by: EMERGENCY MEDICINE

## 2023-08-25 PROCEDURE — 258N000001 HC RX 258: Performed by: INTERNAL MEDICINE

## 2023-08-25 PROCEDURE — 250N000011 HC RX IP 250 OP 636: Performed by: INTERNAL MEDICINE

## 2023-08-25 PROCEDURE — 85027 COMPLETE CBC AUTOMATED: CPT | Performed by: EMERGENCY MEDICINE

## 2023-08-25 PROCEDURE — C9803 HOPD COVID-19 SPEC COLLECT: HCPCS

## 2023-08-25 PROCEDURE — 85007 BL SMEAR W/DIFF WBC COUNT: CPT | Performed by: INTERNAL MEDICINE

## 2023-08-25 PROCEDURE — 83930 ASSAY OF BLOOD OSMOLALITY: CPT | Performed by: INTERNAL MEDICINE

## 2023-08-25 PROCEDURE — 93005 ELECTROCARDIOGRAM TRACING: CPT

## 2023-08-25 RX ORDER — AMLODIPINE BESYLATE 10 MG/1
10 TABLET ORAL DAILY
Status: DISCONTINUED | OUTPATIENT
Start: 2023-08-25 | End: 2023-08-28

## 2023-08-25 RX ORDER — ENOXAPARIN SODIUM 100 MG/ML
40 INJECTION SUBCUTANEOUS EVERY 24 HOURS
Status: DISCONTINUED | OUTPATIENT
Start: 2023-08-25 | End: 2023-08-27

## 2023-08-25 RX ORDER — LORAZEPAM 2 MG/ML
0.5 INJECTION INTRAMUSCULAR EVERY 4 HOURS PRN
Status: DISCONTINUED | OUTPATIENT
Start: 2023-08-25 | End: 2023-08-29 | Stop reason: HOSPADM

## 2023-08-25 RX ORDER — LEVALBUTEROL INHALATION SOLUTION 0.63 MG/3ML
0.63 SOLUTION RESPIRATORY (INHALATION) EVERY 4 HOURS PRN
Status: DISCONTINUED | OUTPATIENT
Start: 2023-08-25 | End: 2023-08-29 | Stop reason: HOSPADM

## 2023-08-25 RX ORDER — HYDRALAZINE HYDROCHLORIDE 20 MG/ML
10 INJECTION INTRAMUSCULAR; INTRAVENOUS EVERY 4 HOURS PRN
Status: DISCONTINUED | OUTPATIENT
Start: 2023-08-25 | End: 2023-08-29 | Stop reason: HOSPADM

## 2023-08-25 RX ORDER — SODIUM CHLORIDE AND POTASSIUM CHLORIDE 150; 450 MG/100ML; MG/100ML
INJECTION, SOLUTION INTRAVENOUS CONTINUOUS
Status: DISCONTINUED | OUTPATIENT
Start: 2023-08-25 | End: 2023-08-25

## 2023-08-25 RX ORDER — DEXTROSE MONOHYDRATE, SODIUM CHLORIDE, AND POTASSIUM CHLORIDE 50; 1.49; 4.5 G/1000ML; G/1000ML; G/1000ML
INJECTION, SOLUTION INTRAVENOUS CONTINUOUS
Status: DISCONTINUED | OUTPATIENT
Start: 2023-08-25 | End: 2023-08-25

## 2023-08-25 RX ORDER — PIPERACILLIN SODIUM, TAZOBACTAM SODIUM 4; .5 G/20ML; G/20ML
4.5 INJECTION, POWDER, LYOPHILIZED, FOR SOLUTION INTRAVENOUS ONCE
Status: COMPLETED | OUTPATIENT
Start: 2023-08-25 | End: 2023-08-25

## 2023-08-25 RX ORDER — NICOTINE POLACRILEX 4 MG
15-30 LOZENGE BUCCAL
Status: DISCONTINUED | OUTPATIENT
Start: 2023-08-25 | End: 2023-08-25

## 2023-08-25 RX ORDER — LABETALOL HYDROCHLORIDE 5 MG/ML
10 INJECTION, SOLUTION INTRAVENOUS ONCE
Status: COMPLETED | OUTPATIENT
Start: 2023-08-25 | End: 2023-08-25

## 2023-08-25 RX ORDER — HYDROMORPHONE HYDROCHLORIDE 1 MG/ML
INJECTION, SOLUTION INTRAMUSCULAR; INTRAVENOUS; SUBCUTANEOUS
Status: COMPLETED
Start: 2023-08-25 | End: 2023-08-25

## 2023-08-25 RX ORDER — DEXTROSE MONOHYDRATE 25 G/50ML
25-50 INJECTION, SOLUTION INTRAVENOUS
Status: DISCONTINUED | OUTPATIENT
Start: 2023-08-25 | End: 2023-08-25

## 2023-08-25 RX ORDER — ONDANSETRON 2 MG/ML
INJECTION INTRAMUSCULAR; INTRAVENOUS
Status: COMPLETED
Start: 2023-08-25 | End: 2023-08-25

## 2023-08-25 RX ORDER — IOPAMIDOL 755 MG/ML
500 INJECTION, SOLUTION INTRAVASCULAR ONCE
Status: COMPLETED | OUTPATIENT
Start: 2023-08-25 | End: 2023-08-25

## 2023-08-25 RX ORDER — DEXTROSE MONOHYDRATE, SODIUM CHLORIDE, SODIUM LACTATE, POTASSIUM CHLORIDE, CALCIUM CHLORIDE 5; 600; 310; 179; 20 G/100ML; MG/100ML; MG/100ML; MG/100ML; MG/100ML
INJECTION, SOLUTION INTRAVENOUS CONTINUOUS
Status: DISCONTINUED | OUTPATIENT
Start: 2023-08-25 | End: 2023-08-26

## 2023-08-25 RX ORDER — DEXTROSE MONOHYDRATE 25 G/50ML
25-50 INJECTION, SOLUTION INTRAVENOUS
Status: DISCONTINUED | OUTPATIENT
Start: 2023-08-25 | End: 2023-08-29 | Stop reason: HOSPADM

## 2023-08-25 RX ORDER — NICOTINE POLACRILEX 4 MG
15-30 LOZENGE BUCCAL
Status: DISCONTINUED | OUTPATIENT
Start: 2023-08-25 | End: 2023-08-29 | Stop reason: HOSPADM

## 2023-08-25 RX ADMIN — INSULIN HUMAN: 1 INJECTION, SOLUTION INTRAVENOUS at 14:39

## 2023-08-25 RX ADMIN — DEXTROSE MONOHYDRATE, SODIUM CHLORIDE, SODIUM LACTATE, POTASSIUM CHLORIDE, CALCIUM CHLORIDE: 5; 600; 310; 179; 20 INJECTION, SOLUTION INTRAVENOUS at 20:22

## 2023-08-25 RX ADMIN — INSULIN HUMAN 5.5 UNITS/HR: 1 INJECTION, SOLUTION INTRAVENOUS at 03:28

## 2023-08-25 RX ADMIN — SODIUM BICARBONATE: 84 INJECTION, SOLUTION INTRAVENOUS at 09:44

## 2023-08-25 RX ADMIN — POTASSIUM CHLORIDE AND SODIUM CHLORIDE: 450; 150 INJECTION, SOLUTION INTRAVENOUS at 03:42

## 2023-08-25 RX ADMIN — HYDRALAZINE HYDROCHLORIDE 10 MG: 20 INJECTION, SOLUTION INTRAMUSCULAR; INTRAVENOUS at 06:40

## 2023-08-25 RX ADMIN — SODIUM BICARBONATE: 84 INJECTION, SOLUTION INTRAVENOUS at 03:18

## 2023-08-25 RX ADMIN — AMLODIPINE BESYLATE 10 MG: 10 TABLET ORAL at 11:52

## 2023-08-25 RX ADMIN — SODIUM CHLORIDE AND POTASSIUM CHLORIDE: 150; 450 INJECTION, SOLUTION INTRAVENOUS at 06:04

## 2023-08-25 RX ADMIN — HYDRALAZINE HYDROCHLORIDE 10 MG: 20 INJECTION, SOLUTION INTRAMUSCULAR; INTRAVENOUS at 15:22

## 2023-08-25 RX ADMIN — SODIUM CHLORIDE 87 ML: 9 INJECTION, SOLUTION INTRAVENOUS at 02:47

## 2023-08-25 RX ADMIN — FLUTICASONE FUROATE 1 PUFF: 100 POWDER RESPIRATORY (INHALATION) at 13:12

## 2023-08-25 RX ADMIN — DEXTROSE MONOHYDRATE, SODIUM CHLORIDE, SODIUM LACTATE, POTASSIUM CHLORIDE, CALCIUM CHLORIDE: 5; 600; 310; 179; 20 INJECTION, SOLUTION INTRAVENOUS at 14:19

## 2023-08-25 RX ADMIN — POTASSIUM CHLORIDE, DEXTROSE MONOHYDRATE AND SODIUM CHLORIDE: 150; 5; 450 INJECTION, SOLUTION INTRAVENOUS at 08:04

## 2023-08-25 RX ADMIN — LABETALOL HYDROCHLORIDE 10 MG: 5 INJECTION, SOLUTION INTRAVENOUS at 09:47

## 2023-08-25 RX ADMIN — ENOXAPARIN SODIUM 40 MG: 40 INJECTION SUBCUTANEOUS at 08:15

## 2023-08-25 RX ADMIN — ONDANSETRON 4 MG: 2 INJECTION INTRAMUSCULAR; INTRAVENOUS at 02:31

## 2023-08-25 RX ADMIN — SODIUM CHLORIDE AND POTASSIUM CHLORIDE: 150; 450 INJECTION, SOLUTION INTRAVENOUS at 06:06

## 2023-08-25 RX ADMIN — HYDROMORPHONE HYDROCHLORIDE: 1 INJECTION, SOLUTION INTRAMUSCULAR; INTRAVENOUS; SUBCUTANEOUS at 02:29

## 2023-08-25 RX ADMIN — HUMAN ALBUMIN MICROSPHERES AND PERFLUTREN 3 ML: 10; .22 INJECTION, SOLUTION INTRAVENOUS at 12:57

## 2023-08-25 RX ADMIN — LORAZEPAM 0.5 MG: 2 INJECTION INTRAMUSCULAR; INTRAVENOUS at 22:16

## 2023-08-25 RX ADMIN — LORAZEPAM 0.5 MG: 2 INJECTION INTRAMUSCULAR; INTRAVENOUS at 15:23

## 2023-08-25 RX ADMIN — FAMOTIDINE 20 MG: 10 INJECTION, SOLUTION INTRAVENOUS at 04:54

## 2023-08-25 RX ADMIN — SODIUM CHLORIDE 1000 ML: 9 INJECTION, SOLUTION INTRAVENOUS at 02:28

## 2023-08-25 RX ADMIN — SODIUM CHLORIDE 1000 ML: 9 INJECTION, SOLUTION INTRAVENOUS at 02:39

## 2023-08-25 RX ADMIN — IOPAMIDOL 76 ML: 755 INJECTION, SOLUTION INTRAVENOUS at 02:47

## 2023-08-25 RX ADMIN — PIPERACILLIN AND TAZOBACTAM 4.5 G: 4; .5 INJECTION, POWDER, FOR SOLUTION INTRAVENOUS at 02:59

## 2023-08-25 RX ADMIN — SODIUM CHLORIDE 1000 ML: 9 INJECTION, SOLUTION INTRAVENOUS at 02:38

## 2023-08-25 RX ADMIN — INSULIN HUMAN 14.5 UNITS/HR: 1 INJECTION, SOLUTION INTRAVENOUS at 06:07

## 2023-08-25 ASSESSMENT — ACTIVITIES OF DAILY LIVING (ADL)
ADLS_ACUITY_SCORE: 26
ADLS_ACUITY_SCORE: 26
TOILETING_ISSUES: NO
ADLS_ACUITY_SCORE: 26
ADLS_ACUITY_SCORE: 26
WEAR_GLASSES_OR_BLIND: NO
DOING_ERRANDS_INDEPENDENTLY_DIFFICULTY: NO
ADLS_ACUITY_SCORE: 35
ADLS_ACUITY_SCORE: 35
ADLS_ACUITY_SCORE: 43
CHANGE_IN_FUNCTIONAL_STATUS_SINCE_ONSET_OF_CURRENT_ILLNESS/INJURY: YES
FALL_HISTORY_WITHIN_LAST_SIX_MONTHS: NO
ADLS_ACUITY_SCORE: 26
ADLS_ACUITY_SCORE: 26
DIFFICULTY_EATING/SWALLOWING: NO
ADLS_ACUITY_SCORE: 26
ADLS_ACUITY_SCORE: 26
DRESSING/BATHING_DIFFICULTY: NO
WALKING_OR_CLIMBING_STAIRS_DIFFICULTY: NO
CONCENTRATING,_REMEMBERING_OR_MAKING_DECISIONS_DIFFICULTY: NO

## 2023-08-25 NOTE — PLAN OF CARE
ICU End of Shift Summary.  For vital signs and complete assessments, please see documentation flowsheets.      Pertinent assessments:   Neuro: alert. Denies pain at this time.  Cardiac: ST. HTN - PRN hydralazine given.   Resp: LS clear on RA.  GI: WDL  : WDL  Skin: WDL  Lines: 3 PIV  Drips: insulin gtt    Major Shift Events:   Admit to ICU    Plan (Upcoming Events): Continue plan of care  Discharge/Transfer Needs: TBD     Bedside Shift Report Completed : Y  Bedside Safety Check Completed: Y

## 2023-08-25 NOTE — PROGRESS NOTES
[facility]    Hospitalist Progress Note  Provider : Kari Fontanez MD, MD  Date of Service (when I saw the patient): 08/25/2023    Assessment & Plan   Kalen Callejas is a 63 year old male admitted on 8/25/2023. He has a history of obesity, asthma was brought in due to shortness of breath which has been going on for couple of days.  He was so short of breath that he could barely walk. His symptoms started a week ago. He denies any fevers or chills.  Initially he thought his symptoms were due to asthma.  He has had nausea and emesis which has been clear.  He denies any abdominal pain.  He denies any chest pain or dizziness or focal weakness.  He denies any blurry vision.  He has been having polyuria and polydipsia for more than a week.  He has no prior history of diabetes.   He stated that he had a backup prescription of prednisone for his asthma which was prescribed for him about a year ago. He stated that he took prednisone 20 mg for 5 days.    In the ER over here when he presented he was markedly tachypneic and tachycardic.  He was noted to have marked anion gap metabolic acidosis with a pH of 6.84, lactic acid of 6.1 and ketones greater than 9.  He was given 3 L of crystalloids, IV Zosyn, bicarbonate.  A CT of the chest abdomen pelvis showed diffuse esophageal wall thickening which can be seen in diffuse esophagitis and a 1 cm left adrenal nodule similar to June 2022 which could represent adrenal adenoma. He has been initiated on an insulin drip.   He was admitted to ICU for further management.      Diabetic ketoacidosis  New diagnosis of diabetes mellitus  No prior diagnosis of diabetes mellitus. Hgb A1c 10.5 on admission  -Initial BMP showed bicarb 4, creatinine 1.33, phos 5.8, ALT 92, AST 52, glucose 517, ketones > 9, lactic acid 5.6, A1C 10.5  -Procalcitonin 0.21, troponin T high sensitivity 18  -Received 3 Liters normal saline in the ER and admitted with 1/2NS with KCl 20 meq  at 125 ml/hr  -Will change IV fluid to D5W with bicarb 150 meq at 150 ml/hr.   -Will monitor BMP. Will also monitor ketones, venous gas   -Will need long acting insulin once off drip.     Hypertensive urgency  -His blood pressure was in 200s on arrival to emergency room. BP down to    -Continue prn hydralazine 10 mg IV q4 hrs prn for SBP >180  -Will need management of his hypertension.   -Echocardiogram ordered   -Will start on ACE-I or ARB given new diagnosis of diabetes    History of asthma  -Completed 5 day course of prednisone. Currently tachyphemic but not wheezy  -Will have prn Xopenex neb available     Lactic Acidosis.  SIRS likely due to DKA  -No clear evidence of infection   -has leucocytosis but it could be due to prednisone and also DKA  -Urinalysis negative. No evidence of pneumonia. Procalcitonin unremarkable  -No antibiotic needed at this time      Shortness of breath   -likely due to DKA.  -No significant wheezing at this time. Will order Xopenex neb prn   -Will continue treatment for DKA as above   -Will monitor on tele and get TTE.    DVT Prophylaxis: Enoxaparin subcutaneous    Code Status: Prior    Disposition: Expected discharge : anticipate more than 2 nights of hospital course until DKA resolves.     Kari Fontanez MD    Interval History   Patient seen and examined today. H&P reviewed. He stated that he is feeling e little better. He stated that he had some cough. He has no fever. No nausea or vomiting.     -Data reviewed today: I reviewed all new labs and imaging results over the last 24 hours. I personally reviewed     Physical Exam   Temp: 98.1  F (36.7  C) Temp src: Temporal BP: (!) 184/101 Pulse: (!) 131   Resp: 19 SpO2: 100 % O2 Device: None (Room air)    Vitals:    08/25/23 0600   Weight: 124.8 kg (275 lb 2.2 oz)     Vital Signs with Ranges  Temp:  [97.5  F (36.4  C)-98.1  F (36.7  C)] 98.1  F (36.7  C)  Pulse:  [131-154] 131  Resp:  [19-45] 19  BP: (114-202)/() 184/101  SpO2:   [90 %-100 %] 100 %  I/O last 3 completed shifts:  In: 268.67 [I.V.:268.67]  Out: -     GEN:  Alert, oriented x 3, appears comfortable, NAD.  HEENT:  Normocephalic/atraumatic, no scleral icterus, no nasal discharge, mouth moist.  CV:  Regular rate and rhythm, no murmur or JVD.  S1 + S2 noted, no S3 or S4.  LUNGS:  Clear to auscultation bilaterally without rales/rhonchi/wheezing/retractions.  Symmetric chest rise on inhalation noted.  ABD:  Active bowel sounds, soft, non-tender/non-distended.  No rebound/guarding/rigidity.  EXT:  No edema or cyanosis.  Hands/feet warm to touch with good signs of peripheral perfusion.  No joint synovitis noted.  SKIN:  Dry to touch, no exanthems noted in the visualized areas.  NEURO:  Symmetric muscle strength, sensation to touch grossly intact.  No new focal deficits appreciated.    Medications    D5W 1,000 mL with sodium bicarbonate 150 mEq/L infusion      dextrose 5% and 0.45% NaCl + KCl 20 mEq/L 125 mL/hr at 08/25/23 0804    insulin 5.5 Units/hr (08/25/23 0830)      enoxaparin ANTICOAGULANT  40 mg Subcutaneous Q24H    labetalol  10 mg Intravenous Once       Data   Recent Labs   Lab 08/25/23  0859 08/25/23  0832 08/25/23  0759 08/25/23  0655 08/25/23  0610 08/25/23  0429 08/25/23  0416 08/25/23  0310 08/25/23  0214 08/25/23  0204   WBC  --   --   --   --  20.7*  --   --   --   --  19.3*   HGB  --   --   --   --  16.3  --  15.3 14.3   < > 17.5   MCV  --   --   --   --  106*  --   --   --   --  108*   PLT  --   --   --   --  397  --   --   --   --  486*   NA  --   --   --   --  140  --  133 143   < > 138   POTASSIUM  --   --   --   --  3.7  --  >9.0* 4.0   < > 4.3   CHLORIDE  --   --   --   --  104  --  118* 118*   < > 96*   CO2  --   --   --   --  4*  --   --   --   --  4*   BUN  --   --   --   --  24.6*  --  43* 23   < > 25.2*   CR  --   --   --   --  1.02  --  0.9 0.9   < > 1.33*   ANIONGAP  --   --   --   --  32*  --   --   --   --  38*   MARGE  --   --   --   --  8.5*  --   --   --    --  10.3*   * 192* 169*   < > 333*   < > 440* 463*   < > 517*   ALBUMIN  --   --   --   --  3.9  --   --   --   --  4.6   PROTTOTAL  --   --   --   --  7.6  --   --   --   --  9.0*   BILITOTAL  --   --   --   --  0.7  --   --   --   --  0.8   ALKPHOS  --   --   --   --  139*  --   --   --   --  175*   ALT  --   --   --   --  78*  --   --   --   --  92*   AST  --   --   --   --  45  --   --   --   --  52*   LIPASE  --   --   --   --   --   --   --   --   --  28    < > = values in this interval not displayed.       Recent Results (from the past 24 hour(s))   CT Chest (PE) Abdomen Pelvis w Contrast    Narrative    EXAM: CT CHEST PE ABDOMEN PELVIS W CONTRAST  LOCATION: [facility]  DATE: 8/25/2023    INDICATION: SOB, abdominal pain, ? sepsis.  COMPARISON: CT abdomen and pelvis on 06/09/2022.  TECHNIQUE: CT chest pulmonary angiogram and routine CT abdomen pelvis with IV contrast. Arterial phase through the chest and venous phase through the abdomen and pelvis. Multiplanar reformats and MIP reconstructions were performed. Dose reduction   techniques were used.   CONTRAST: 76mL Isovue 370    FINDINGS: The exam is mildly limited by motion/respiratory artifacts, within this limitation, there is;  ANGIOGRAM CHEST: Pulmonary arteries are normal caliber and negative for pulmonary emboli. Thoracic aorta is negative for dissection. No CT evidence of right heart strain.     MEDIASTINUM/AXILLAE: No cardiomegaly or significant pericardial effusion. No significant mediastinal or hilar lymphadenopathy. Diffuse esophageal wall thickening, which appears mildly distended with fluid.    LUNGS AND PLEURA: No pleural effusion or pneumothorax. No suspicious focal pulmonary opacities.    CORONARY ARTERY CALCIFICATION: Moderate.    ABDOMEN/PELVIS:    HEPATOBILIARY: No suspicious focal hepatic lesion. The gallbladder is unremarkable.    PANCREAS: No main pancreatic ductal dilatation or definite solid pancreatic  mass.    SPLEEN: No splenomegaly.    ADRENAL GLANDS: 1 cm left adrenal nodule. No right adrenal nodule.    KIDNEYS/BLADDER: No radiodense kidney/ureteral stones or hydronephrosis in either kidney.    BOWEL: No abnormally dilated bowel loops. The appendix is visualized and appears normal.    PERITONEUM: No evidence of free fluid in the abdomen and pelvis. No free peritoneal or portal venous gas.    PELVIC ORGANS: Coarse prostatic calcification.    VASCULATURE: Unremarkable.    LYMPH NODES: No significant abdominopelvic lymphadenopathy.    MUSCULOSKELETAL: Postsurgical changes of T10-L2 spinal fusion. Compression deformity of the T12 vertebra likely unchanged as compared to 06/09/2022 exam.      Impression    IMPRESSION: Exam is limited by motion/respiratory artifact, within this limitation, there is;  1. No evidence of thoracoabdominal aortic aneurysm or dissection.  2. Diffuse esophageal wall thickening, which appears mildly distended with fluid, findings can be seen with diffuse esophagitis.  3. 1 cm left adrenal nodule, not significantly changed as compared to 06/09/2022 exam, could represent adrenal adenoma.

## 2023-08-25 NOTE — PHARMACY-ADMISSION MEDICATION HISTORY
Pharmacy Intern Admission Medication History    Admission medication history is complete. The information provided in this note is only as accurate as the sources available at the time of the update.    Medication reconciliation/reorder completed by provider prior to medication history? No    Information Source(s): Patient and CareEverywhere/SureScripts via in-person    Pertinent Information: -    Changes made to PTA medication list:  Added: None  Deleted: Azelastine 0.15 % nasal spray, Gabapentin 300 mg, Lisinopril 20 mg, Oxycodone IR 10 mg  Changed: None    Medication Affordability:  Not including over the counter (OTC) medications, was there a time in the past 3 months when you did not take your medications as prescribed because of cost?: No    Allergies reviewed with patient and updates made in EHR: yes    Medication History Completed By: Wili Burrows 8/25/2023 1:38 PM    Prior to Admission medications    Medication Sig Last Dose Taking? Auth Provider Long Term End Date   acetaminophen (TYLENOL) 325 MG tablet Take 2 tablets (650 mg) by mouth every 6 hours as needed for other (For optimal non-opioid multimodal pain management to improve pain control.) Past Month Yes Ricky Randhawa APRN CNP     albuterol (PROAIR HFA/PROVENTIL HFA/VENTOLIN HFA) 108 (90 Base) MCG/ACT inhaler Inhale 2 puffs into the lungs every 4 hours as needed 8/24/2023 at am Yes Reported, Patient Yes    budesonide (PULMICORT FLEXHALER) 180 MCG/ACT inhaler Inhale 2 puffs into the lungs 2 times daily 8/23/2023 at pm Yes Reported, Patient Yes    fluticasone (FLONASE) 50 MCG/ACT nasal spray Spray 2 sprays into both nostrils daily 8/23/2023 Yes Unknown, Entered By History     meloxicam (MOBIC) 7.5 MG tablet Take 1 tablet (7.5 mg) by mouth 2 times daily Past Week Yes Sulaiman Akins MD Yes    methocarbamol (ROBAXIN) 500 MG tablet Take 1 tablet (500 mg) by mouth 3 times daily Past Month Yes Fabi Marquez PA-C     methocarbamol (ROBAXIN) 750 MG  tablet Take 1 tablet (750 mg) by mouth every 6 hours as needed for muscle spasms Past Month Yes Dada Acosta PA-C

## 2023-08-25 NOTE — PROGRESS NOTES
An ABG was completed on the pt's left radial @ 0648 on RA.  Pressure was held until bleeding stopped.  No complications noted during the procedure.    Leopoldo Webster, RT on 8/25/2023 at 6:51 AM

## 2023-08-25 NOTE — ED PROVIDER NOTES
History     Chief Complaint:  Shortness of Breath       The history is provided by the spouse and the patient.      Kalen Callejas is a 63 year old male with a history of type II DM who presents with shortness of breath amongst other symptoms. The patient's wife reports that the patient has complained of abdominal pain for the past few days. The patient complains of left lower quadrant abdominal pain and shortness of breath due to the pain. He denies experiencing cough, fever, chest pain, bloody stools, diarrhea, or dysuria. Wife reports he has been dry heaving at home the past few days and taking in minimal PO intake.  He states that he drinks alcohol but has not had any recently.     Independent Historian:   History provided by the patient's wife and the patient as noted above.    Review of External Notes:   I reviewed the patient's ED to hospital admission notes at [facility] from 6/9-6/17/2022.    Medications:    Albuterol inhaler  Budesonide inhaler  Lisinopril    Past Medical History:    Allergic rhinitis  Asthma, moderate persistent without complication  Obesity  T-12 burst fracture  Type 2 diabetes mellitus    Past Surgical History:    Carpal tunnel release, right  Spinal fusion, thoracic, posterior, 2 levels     Physical Exam   Patient Vitals for the past 24 hrs:   BP Temp Pulse Resp SpO2   08/25/23 0346 (!) 154/93 -- (!) 135 (!) 34 100 %   08/25/23 0330 (!) 160/87 -- (!) 133 30 100 %   08/25/23 0315 (!) 150/78 -- (!) 135 (!) 32 99 %   08/25/23 0300 -- -- -- (!) 32 --   08/25/23 0215 (!) 202/132 -- (!) 151 (!) 43 90 %   08/25/23 0200 (!) 166/113 97.5  F (36.4  C) (!) 154 (!) 35 98 %        Physical Exam  Nursing note and vitals reviewed.  Constitutional: Well nourished. Ill appearing  Eyes: Conjunctiva normal.  Pupils are equal, round, and reactive to light.   ENT: Nose normal. Mucous membranes pink and dry. No posterior oropharyngeal erythema.   Neck: Normal range of motion.  CVS: Sinus tachycardia.  Normal  heart sounds.   Pulmonary: Lungs clear to auscultation bilaterally. Tachypnea  GI: Abdomen soft.Generalized tenderness. No rigidity or guarding. No CVA tenderness  : Circumcised.  No testicular tenderness or masses. Erythema to glans noted with white pearly papules.  Chaperone RN Lashanda JOLLYK: No calf tenderness or swelling.  Neuro: Alert. Follows simple commands.   Skin: Skin is warm and dry. No rash noted.   Psychiatric: Anxious appearing      Emergency Department Course   ECG  ECG taken at 0212, ECG read at 0212  Sinus tachycardia.  Cannot rule out inferior infarct, age undetermined.   Rate 151 bpm. WA interval 134 ms. QRS duration 80 ms. QT/QTc 262/415 ms. P-R-T axes 56 2 76.     Imaging:  CT Chest (PE) Abdomen Pelvis w Contrast   Final Result   IMPRESSION: Exam is limited by motion/respiratory artifact, within this limitation, there is;   1. No evidence of thoracoabdominal aortic aneurysm or dissection.   2. Diffuse esophageal wall thickening, which appears mildly distended with fluid, findings can be seen with diffuse esophagitis.   3. 1 cm left adrenal nodule, not significantly changed as compared to 06/09/2022 exam, could represent adrenal adenoma.         Report per radiology    Laboratory:  Labs Ordered and Resulted from Time of ED Arrival to Time of ED Departure   COMPREHENSIVE METABOLIC PANEL - Abnormal       Result Value    Sodium 138      Potassium 4.3      Chloride 96 (*)     Carbon Dioxide (CO2) 4 (*)     Anion Gap 38 (*)     Urea Nitrogen 25.2 (*)     Creatinine 1.33 (*)     Calcium 10.3 (*)     Glucose 517 (*)     Alkaline Phosphatase 175 (*)     AST 52 (*)     ALT 92 (*)     Protein Total 9.0 (*)     Albumin 4.6      Bilirubin Total 0.8      GFR Estimate 60 (*)    LACTIC ACID WHOLE BLOOD - Abnormal    Lactic Acid 6.1 (*)    BLOOD GAS VENOUS WITH OXYHEMOGLOBIN - Abnormal    pH Venous 6.84 (*)     pCO2 Venous 28 (*)     pO2 Venous 39      Bicarbonate Venous 5 (*)     FIO2 0      Oxyhemoglobin Venous  58 (*)     Base Excess/Deficit -29.1 (*)    CBC WITH PLATELETS AND DIFFERENTIAL - Abnormal    WBC Count 19.3 (*)     RBC Count 5.06      Hemoglobin 17.5      Hematocrit 54.5 (*)      (*)     MCH 34.6 (*)     MCHC 32.1      RDW 12.1      Platelet Count 486 (*)    MAGNESIUM - Abnormal    Magnesium 2.8 (*)    KETONE BETA-HYDROXYBUTYRATE QUANTITATIVE, RAPID - Abnormal    Ketone (Beta-Hydroxybutyrate) Quantitative >9.00 (*)    GLUCOSE BY METER - Abnormal    GLUCOSE BY METER POCT 484 (*)    PHOSPHORUS - Abnormal    Phosphorus 5.8 (*)    HEMOGLOBIN A1C - Abnormal    Hemoglobin A1C 10.5 (*)    ISTAT CREATININE POCT - Abnormal    Creatinine POCT 1.4 (*)     GFR, ESTIMATED POCT 56 (*)    ISTAT GASES LACTATE VENOUS POCT - Abnormal    Lactic Acid POCT 5.6 (*)     Bicarbonate Venous POCT 5 (*)     O2 Sat, Venous POCT 57 (*)     pCO2 Venous POCT 25 (*)     pH Venous POCT 6.89 (*)     pO2 Venous POCT 49 (*)    DIFFERENTIAL - Abnormal    % Neutrophils 78      % Lymphocytes 15      % Monocytes 7      % Eosinophils 0      % Basophils 0      Absolute Neutrophils 15.1 (*)     Absolute Lymphocytes 2.9      Absolute Monocytes 1.4 (*)     Absolute Eosinophils 0.0      Absolute Basophils 0.0      RBC Morphology Confirmed RBC Indices      Platelet Assessment        Value: Automated Count Confirmed. Platelet morphology is normal.   GLUCOSE BY METER - Abnormal    GLUCOSE BY METER POCT 447 (*)    ISTAT BASIC CHEM ICA HEMATOCRIT POCT - Abnormal    Chloride POCT 117 (*)     Potassium POCT >9.0 (*)     Sodium POCT 134      UREA NITROGEN POCT 49 (*)     Calcium, Ionized Whole Blood POCT 4.3 (*)     Glucose Whole Blood POCT 519 (*)     Anion Gap POCT <1.0 (*)     Hemoglobin POCT 18.4 (*)     Hematocrit POCT 54 (*)     Creatinine POCT 1.5 (*)     TOTAL CO2 POCT 9 (*)    ISTAT BASIC CHEM ICA HEMATOCRIT POCT - Abnormal    Chloride POCT 118 (*)     Potassium POCT 4.0      Sodium POCT 143      UREA NITROGEN POCT 23      Calcium, Ionized Whole  Blood POCT 4.7      Glucose Whole Blood POCT 463 (*)     Anion Gap POCT 23.0 (*)     Hemoglobin POCT 14.3      Hematocrit POCT 42      Creatinine POCT 0.9      TOTAL CO2 POCT 6 (*)    ISTAT GASES LACTATE VENOUS POCT - Abnormal    Lactic Acid POCT 3.5 (*)     Bicarbonate Venous POCT 3 (*)     O2 Sat, Venous POCT 78 (*)     pCO2 Venous POCT 16 (*)     pH Venous POCT 6.89 (*)     pO2 Venous POCT 70 (*)    ROUTINE UA WITH MICROSCOPIC - Abnormal    Color Urine Light Yellow      Appearance Urine Clear      Glucose Urine >=1000 (*)     Bilirubin Urine Negative      Ketones Urine >150 (*)     Specific Gravity Urine 1.024      Blood Urine Moderate (*)     pH Urine 5.5      Protein Albumin Urine 100 (*)     Urobilinogen Urine Normal      Nitrite Urine Negative      Leukocyte Esterase Urine Negative      Mucus Urine Present (*)     RBC Urine <1      WBC Urine <1      Squamous Epithelials Urine 1      Hyaline Casts Urine 6 (*)     Granular Casts Urine 15 (*)    PROCALCITONIN - Abnormal    Procalcitonin 0.21 (*)    GLUCOSE BY METER - Abnormal    GLUCOSE BY METER POCT 404 (*)    ISTAT BASIC CHEM ICA HEMATOCRIT POCT - Abnormal    Chloride POCT 118 (*)     Potassium POCT >9.0 (*)     Sodium POCT 133      UREA NITROGEN POCT 43 (*)     Calcium, Ionized Whole Blood POCT 4.3 (*)     Glucose Whole Blood POCT 440 (*)     Anion Gap POCT <1.0 (*)     Hemoglobin POCT 15.3      Hematocrit POCT 45      Creatinine POCT 0.9      TOTAL CO2 POCT 7 (*)    INFLUENZA A/B, RSV, & SARS-COV2 PCR - Normal    Influenza A PCR Negative      Influenza B PCR Negative      RSV PCR Negative      SARS CoV2 PCR Negative     LIPASE - Normal    Lipase 28     TROPONIN T, HIGH SENSITIVITY - Normal    Troponin T, High Sensitivity 18     GLUCOSE MONITOR NURSING POCT   OSMOLALITY   GLUTAMIC ACID DECARBOXYLASE ANTIBODY   BLOOD CULTURE   BLOOD CULTURE   URINE CULTURE      Emergency Department Course & Assessments:    Interventions:  Medications   famotidine (PEPCID)  injection 20 mg (has no administration in time range)   glucose gel 15-30 g (has no administration in time range)     Or   dextrose 50 % injection 25-50 mL (has no administration in time range)     Or   glucagon injection 1 mg (has no administration in time range)   0.45% sodium chloride + KCl 20 mEq/L infusion ( Intravenous $New Bag 8/25/23 0342)   dextrose 5% and 0.45% NaCl + KCl 20 mEq/L infusion (has no administration in time range)   insulin regular (MYXREDLIN) infusion (5.5 Units/hr Intravenous $New Bag 8/25/23 0328)   ondansetron (ZOFRAN) 2 MG/ML injection (4 mg  $Given 8/25/23 0231)   HYDROmorphone (PF) (DILAUDID) 0.5 MG/0.5 ML injection (  $Given 8/25/23 0229)   0.9% sodium chloride BOLUS (0 mLs Intravenous Stopped 8/25/23 0312)   piperacillin-tazobactam (ZOSYN) 4.5 g vial to attach to  mL bag (0 g Intravenous Stopped 8/25/23 0343)   sodium bicarbonate 100 mEq, potassium chloride 20 mEq in sterile water (preservative free) 510 mL solution ( Intravenous $Given 8/25/23 0318)   CT scan flush (87 mLs Intravenous $Given 8/25/23 0247)   iopamidol (ISOVUE-370) solution 500 mL (76 mLs Intravenous $Given 8/25/23 0247)   0.9% sodium chloride BOLUS (0 mLs Intravenous Stopped 8/25/23 0312)   0.9% sodium chloride BOLUS (0 mLs Intravenous Stopped 8/25/23 0312)      Assessments:  0211 I obtained history and examined the patient.    Consultations/Discussion of Management or Tests:  0359 I spoke on the phone with Dr. David Self regarding the patient's presentation, findings, and plan of care.    Social Determinants of Health affecting care:   None    Disposition:  The patient was admitted to the hospital under the care of Dr. Self.     Impression & Plan      CMS Diagnoses: The patient has signs of Septic Shock  The patient has signs of septic shock as evidenced by:  1. Presence of Sepsis, AND  2. Lactic Acidosis with value greater than or equal to 4    Time septic shock diagnosis confirmed = 0225 08/25/23   as  "this was the time when Lactate was resulted and the level was greater than or equal to 4    3 Hour Septic Shock Bundle Completion:  1. Initial Lactic Acid Result:   Recent Labs   Lab Test 08/25/23  0310 08/25/23  0221 08/25/23  0204   LACT 3.5* 5.6* 6.1*     2. Blood Cultures before Antibiotics: Yes  3. Broad Spectrum Antibiotics Administered:  yes    Anti-infectives (From admission through now)      Start     Dose/Rate Route Frequency Ordered Stop    08/25/23 0225  piperacillin-tazobactam (ZOSYN) 4.5 g vial to attach to  mL bag        Note to Pharmacy: For [facility], [facility] and [facility]: For Zosyn-naive patients, use the \"Zosyn initial dose + extended infusion\" order panel.    4.5 g  over 30 Minutes Intravenous ONCE 08/25/23 0223 08/25/23 0343            4. IF 30 mL/kg bolus criteria met based on:  -Lactate > 4  OR  -Initial Hypotension:  Definition:  2 low BP readings (SBP <90, MAP <65, or decrease > 40 from baseline due to infection) within 3 hrs of each other during the time period of 6 hrs before and 3 hrs  after time zero  THEN: Fluid volume administered in ED:  Full 30 mL/kg bolus given (see amount below).    BMI Readings from Last 1 Encounters:   06/15/22 37.32 kg/m      30 mL/kg fluids based on weight: Patient actual weight not available.  30 mL/kg fluids based on IBW (must be >= 60 inches tall): Patient ideal weight not available.    Septic Shock reassessment:  1. Repeat Lactic Acid Level: 3.5  2. MAP>65 after initial IVF bolus, will continue to monitor fluid status and vital signs  I attest to having performed a repeat sepsis exam and assessment of perfusion at 0310 and the results demonstrate improved perfusion.    Medical Decision Making:  Patient is a 64 yo male presenting with abdominal pain and dyspnea.  He is ill appearing on arrival with notable kussmaul breathing concerning for DKA today.  He had a notable metabolic acidosis, pH=6.8.  Bicarb drip was initiated in addition to aggressive IVF and insulin gtt " after potassium resulted normal.  Patient with noted leukocytosis and lactic acidosis.  He was given empiric IV zosyn, blood cultures sent given concern for possible underlying sepsis driving DKA.  CT chest/abdomen/pelvis without evidence to suggest septic source. Findings suggestive of more esophagitis. Incidental adrenal nodule identified and discussed with the patient.  UA negative, formal culture sent.  On exam, patient is noted to have some erythema to his glans penis though I do not feel this is the source of potential sepsis today.  Patient reported dyspnea on arrival though I suspect this is compensatory 2/2 to DKA.  Lungs clear overall.  EKG without STEMI. Screening troponin negative; no active chest pain. No PE/pneumonia/pneumothorax.  He is not hypoxic at bedside; was placed on minimal supplemental nasal cannula for comfort.  His acidosis slowly improved as did his lactic acidosis.  He is mentating at bedside and vitals improved throughout his time in the ED.  Given his critical ill status, he will be placed in the ICU for further management.      Critical Care time:  was 40 minutes for this patient excluding procedures.    Diagnosis:    ICD-10-CM    1. Diabetic ketoacidosis without coma associated with type 2 diabetes mellitus (H)  E11.10       2. Esophagitis  K20.90       3. Adrenal nodule (H)  E27.8       4. Severe sepsis (H)  A41.9     R65.20              Scribe Disclosure:  Elver CARDOZA, am serving as a scribe at 2:17 AM on 8/25/2023 to document services personally performed by Roselyn العراقي DO based on my observations and the provider's statements to me.          Roselyn العراقي DO  08/25/23 [id number]

## 2023-08-25 NOTE — H&P
[facility]    History and Physical - Hospitalist Service       Date of Admission:  8/25/2023    Assessment & Plan      Kalen Callejas is a 63 year old male admitted on 8/25/2023. He has a history of obesity, asthma was brought in due to shortness of breath which has been going on for couple of days.  He was so short of breath that he could barely walk.  He has had a cough which is clear in nature.  He denies any fevers or chills.  Initially he thought his symptoms were due to asthma.  He has had nausea and emesis which has been clear.  He denies any abdominal pain.  He denies any chest pain or dizziness or focal weakness.  He denies any blurry vision.  He has been having polyuria and polydipsia.  He has no prior history of diabetes.  In the ER over here when he presented he was markedly tachypneic and tachycardic.  He was noted to have marked anion gap metabolic acidosis with a pH of 6.84, lactic acid of 6.1 and ketones greater than 9.  He was given 3 L of crystalloids, IV Zosyn, bicarbonate.  A CT of the chest abdomen pelvis showed diffuse esophageal wall thickening which can be seen in diffuse esophagitis and a 1 cm left adrenal nodule similar to June 2022 which could represent adrenal adenoma.  He has been initiated on an insulin drip.  I discussed his case with Dr. العراقي.  I am asked to admit him for further evaluation.    SIRS manifested as DKA.  - s/p 3 L IVF's  - IVF's.  - Insulin gtt.  - monitor electrolytes.    2. Lactic Acidosis.  - likely due to above.  - doubt need IV abx.  - check procal.  - no foci of infection, check UA.    3. SOB.  - likely due to DKA.  - will monitor on tele and get TTE.         Diet:  NPO.  DVT Prophylaxis: Enoxaparin (Lovenox) SQ  Schmitt Catheter: Not present  Lines: None     Cardiac Monitoring: None  Code Status:  Full Code.    Clinically Significant Risk Factors Present on Admission        # Hyperkalemia: Highest K = 9.9 mmol/L in last 2 days, will  monitor as appropriate   # Hypocalcemia: Lowest iCa = 4.3 mg/dL in last 2 days, will monitor and replace as appropriate  # Hypercalcemia: Highest Ca = 10.3 mg/dL in last 2 days, will monitor as appropriate   # Anion Gap Metabolic Acidosis: Highest Anion Gap = 38 mmol/L in last 2 days, will monitor and treat as appropriate      # Hypertension: Home medication list includes antihypertensive(s)     # DMII: A1C = 10.5 % (Ref range: <5.7 %) within past 6 months               Disposition Plan      Expected Discharge Date: 08/27/2023                  David Self MD  Hospitalist Service  [facility]  Securely message with [employer] (more info)  Text page via [facility] Paging/Directory     ______________________________________________________________________    Chief Complaint     SOB.    History is obtained from the patient    History of Present Illness   Kalen Callejas is a 63 year old male who has a history of obesity, asthma was brought in due to shortness of breath which has been going on for couple of days.  He was so short of breath that he could barely walk.  He has had a cough which is clear in nature.  He denies any fevers or chills.  Initially he thought his symptoms were due to asthma.  He has had nausea and emesis which has been clear.  He denies any abdominal pain.  He denies any chest pain or dizziness or focal weakness.  He denies any blurry vision.  He has been having polyuria and polydipsia.  He has no prior history of diabetes.  In the ER over here when he presented he was markedly tachypneic and tachycardic.  He was noted to have marked anion gap metabolic acidosis with a pH of 6.84, lactic acid of 6.1 and ketones greater than 9.  He was given 3 L of crystalloids, IV Zosyn, bicarbonate.  A CT of the chest abdomen pelvis showed diffuse esophageal wall thickening which can be seen in diffuse esophagitis and a 1 cm left adrenal nodule similar to June 2022 which could represent adrenal  adenoma.  He has been initiated on an insulin drip.  I discussed his case with Dr. العراقي.  I am asked to admit him for further evaluation.      Past Medical History    No past medical history on file.    Past Surgical History   Past Surgical History:   Procedure Laterality Date    FUSION SPINE POSTERIOR THORACIC TWO LEVELS N/A 2022    Procedure: THORACIC 10 LUMBAR 2, POSTERIOR INSTRUMENTED FUSION, THORACIC 12 LAMINECTOMY, POSS PERCUTANEOUS SCREWS, POSS ADDITIONAL LEVELS WITH O-arm/Stealth GUIDANCE;  Surgeon: Sulaiman Akins MD;  Location: [facility] OR     B TKA.    Prior to Admission Medications   Prior to Admission Medications   Prescriptions Last Dose Informant Patient Reported? Taking?   acetaminophen (TYLENOL) 325 MG tablet   No No   Sig: Take 2 tablets (650 mg) by mouth every 6 hours as needed for other (For optimal non-opioid multimodal pain management to improve pain control.)   albuterol (PROAIR HFA/PROVENTIL HFA/VENTOLIN HFA) 108 (90 Base) MCG/ACT inhaler   Yes No   Sig: Inhale 2 puffs into the lungs every 4 hours as needed   azelastine (ASTEPRO) 0.15 % nasal spray   Yes No   Sig: Spray 1 spray in nostril 2 times daily   budesonide (PULMICORT FLEXHALER) 180 MCG/ACT inhaler   Yes No   Sig: Inhale 2 puffs into the lungs 2 times daily   fluticasone (FLONASE) 50 MCG/ACT nasal spray   Yes No   Sig: Spray 2 sprays into both nostrils daily   gabapentin (NEURONTIN) 300 MG capsule   No No   Simg bid for 1 week, then 300mg every day for 1 week, then stop.   lisinopril (ZESTRIL) 20 MG tablet   Yes No   Sig: Take 20 mg by mouth daily   meloxicam (MOBIC) 7.5 MG tablet   No No   Sig: Take 1 tablet (7.5 mg) by mouth 2 times daily   methocarbamol (ROBAXIN) 500 MG tablet   No No   Sig: Take 1 tablet (500 mg) by mouth 3 times daily   methocarbamol (ROBAXIN) 750 MG tablet   No No   Sig: Take 1 tablet (750 mg) by mouth every 6 hours as needed for muscle spasms   oxyCODONE IR (ROXICODONE) 10 MG tablet   No No   Sig:  Take 1 tablet (10 mg) by mouth every 8 hours as needed for severe pain ((pain rating 7-10))      Facility-Administered Medications: None        Review of Systems    The 10 point Review of Systems is negative other than noted in the HPI or here.     Social History   I have reviewed this patient's social history and updated it with pertinent information if needed.  Social History     Tobacco Use    Smoking status: Never    Smokeless tobacco: Never     + used to drink alcohol mainly whisky daily for some time. However, has no alcohol since 8/15/23.      Family History       Father - HTN.      Allergies   Allergies   Allergen Reactions    Bee Venom         Physical Exam   Vital Signs: Temp: 97.5  F (36.4  C)   BP: 114/79 Pulse: (!) 138   Resp: 28 SpO2: 100 % O2 Device: None (Room air)    Weight: 0 lbs 0 oz    Gen - Alert, awake, confused. Thinks its September. Answers questions appropriately. Appears acutely ill.  HEENT - PERRLA.  Lungs - CTA B.  HEart - tachycardic, S1+S2 nml, no m/g/r.  Abd - soft, NT, ND, + BS.  Ext - no edema.  Neuro - CN II-XII wnl, power 4/5 all extremities.    EKG shows sinus tachycardia at 151 bpm, cannot rule out inferior infarct.    Medical Decision Making       80 MINUTES SPENT BY ME on the date of service doing chart review, history, exam, documentation & further activities per the note.      Data     I have personally reviewed the following data over the past 24 hrs:    19.3 (H)  \   14.3   / 486 (H)     143 118 (H) 23 /  404 (H)   4.0 4 (LL) 0.9 \     ALT: 92 (H) AST: 52 (H) AP: 175 (H) TBILI: 0.8   ALB: 4.6 TOT PROTEIN: 9.0 (H) LIPASE: 28     Trop: 18 BNP: N/A     TSH: N/A T4: N/A A1C: 10.5 (H)     Procal: N/A CRP: N/A Lactic Acid: 3.5 (H)         Imaging results reviewed over the past 24 hrs:   Recent Results (from the past 24 hour(s))   CT Chest (PE) Abdomen Pelvis w Contrast    Narrative    EXAM: CT CHEST PE ABDOMEN PELVIS W CONTRAST  LOCATION: [facility]  DATE:  8/25/2023    INDICATION: SOB, abdominal pain, ? sepsis.  COMPARISON: CT abdomen and pelvis on 06/09/2022.  TECHNIQUE: CT chest pulmonary angiogram and routine CT abdomen pelvis with IV contrast. Arterial phase through the chest and venous phase through the abdomen and pelvis. Multiplanar reformats and MIP reconstructions were performed. Dose reduction   techniques were used.   CONTRAST: 76mL Isovue 370    FINDINGS: The exam is mildly limited by motion/respiratory artifacts, within this limitation, there is;  ANGIOGRAM CHEST: Pulmonary arteries are normal caliber and negative for pulmonary emboli. Thoracic aorta is negative for dissection. No CT evidence of right heart strain.     MEDIASTINUM/AXILLAE: No cardiomegaly or significant pericardial effusion. No significant mediastinal or hilar lymphadenopathy. Diffuse esophageal wall thickening, which appears mildly distended with fluid.    LUNGS AND PLEURA: No pleural effusion or pneumothorax. No suspicious focal pulmonary opacities.    CORONARY ARTERY CALCIFICATION: Moderate.    ABDOMEN/PELVIS:    HEPATOBILIARY: No suspicious focal hepatic lesion. The gallbladder is unremarkable.    PANCREAS: No main pancreatic ductal dilatation or definite solid pancreatic mass.    SPLEEN: No splenomegaly.    ADRENAL GLANDS: 1 cm left adrenal nodule. No right adrenal nodule.    KIDNEYS/BLADDER: No radiodense kidney/ureteral stones or hydronephrosis in either kidney.    BOWEL: No abnormally dilated bowel loops. The appendix is visualized and appears normal.    PERITONEUM: No evidence of free fluid in the abdomen and pelvis. No free peritoneal or portal venous gas.    PELVIC ORGANS: Coarse prostatic calcification.    VASCULATURE: Unremarkable.    LYMPH NODES: No significant abdominopelvic lymphadenopathy.    MUSCULOSKELETAL: Postsurgical changes of T10-L2 spinal fusion. Compression deformity of the T12 vertebra likely unchanged as compared to 06/09/2022 exam.      Impression     IMPRESSION: Exam is limited by motion/respiratory artifact, within this limitation, there is;  1. No evidence of thoracoabdominal aortic aneurysm or dissection.  2. Diffuse esophageal wall thickening, which appears mildly distended with fluid, findings can be seen with diffuse esophagitis.  3. 1 cm left adrenal nodule, not significantly changed as compared to 06/09/2022 exam, could represent adrenal adenoma.

## 2023-08-25 NOTE — PLAN OF CARE
Patient Afebrile this shift. No pain.  Neuro: Alert and orientated to self, place and situation. Forgetful and confused at times.   Cardiac: Tele SR/ ST HR in the 120's - 130's. SBP greater than 180's at times. Given hydralazine and labetalol. Started and amlodipine 10 mg daily.   Respiratory: LS clear, wheezing at times. PRN nebs if needed. RA 98%  :  uses urinal at bedside, 800 Urine output this shift.  GI:  NPO. Ice chips and medications orally ok. Insulin drip with hourly blood sugar checks.  Skin: see flowsheet.  Lines: PIV x 3  Drips: insulin, D5 LR + 20K.    Patients had many critical labs today. Ketones and VBG's. See flow sheet. Most have now improved into non critical labs.  Patient does drink alcohol daily (about a pint) according to his wife. He may beginning to withdrawal. Gave ativan x 1. Last drink Wed. 8/23/23 per his wife.     Dr. Church ordered PRN ativan for anxiety and possible ETOH withdrawal. He did not want to go with gabapentin at this time.

## 2023-08-25 NOTE — CONSULTS
Critical Care Consult Note      08/25/2023    Name: Kalen Callejas MRN#: [id number]   Age: 63 year old YOB: 1960     Hsptl Day# 0  ICU DAY #0    MV DAY #0             Problem List:   Principal Problem:    DKA (diabetic ketoacidosis) (H)  Active Problems:    Diabetic ketoacidosis without coma associated with type 2 diabetes mellitus (H)      HPI   Patient is 63M with past medical history of asthma (receiving pulmicort from [facility]), but does not seek healthcare much (states he is afraid of physicians), presented to ED after he was recently started on prednisone for asthma exacerbation. He had associated abdominal pain and his dyspnea was progressively getting worse. Not aware of DM diagnosis. Found to be in severe DKA in ED with pH 6.94 venous, AG of 38 and strongly positive ketones in urine and blood. He received 3 liters of IVF boluses in ED and started on insulin gtt. Continues to receive IV maintenance here at 125 ml/hr. Seems to had lab error showing K of 9 but no EKG changes and repeat K was 4.      IV/Access:   1. Venous access - PIVs  2. Arterial access - None       ICU Prophylaxis:   1. DVT: LMWH  2. VAP: HOB 30 degrees, chlorhexidine rinse  3. Stress Ulcer: Not indicated   4. Restraints: Not indicated   5. IV Access - PIVs   6. Feeding - NPO until AG closes   7. Family Update:Updated patient   8. Disposition - ICU     Current Facility-Administered Medications   Medication    D5W 1,000 mL with sodium bicarbonate 150 mEq/L infusion    dextrose 5% and 0.45% NaCl + KCl 20 mEq/L infusion    glucose gel 15-30 g    Or    dextrose 50 % injection 25-50 mL    Or    glucagon injection 1 mg    enoxaparin ANTICOAGULANT (LOVENOX) injection 40 mg    hydrALAZINE (APRESOLINE) injection 10 mg    insulin regular (MYXREDLIN) infusion    levalbuterol (XOPENEX) neb solution 0.63 mg       Allergies   Allergen Reactions    Bee Venom        No past medical history on file.    Past Surgical History:    Procedure Laterality Date    FUSION SPINE POSTERIOR THORACIC TWO LEVELS N/A 6/13/2022    Procedure: THORACIC 10 LUMBAR 2, POSTERIOR INSTRUMENTED FUSION, THORACIC 12 LAMINECTOMY, POSS PERCUTANEOUS SCREWS, POSS ADDITIONAL LEVELS WITH O-arm/Stealth GUIDANCE;  Surgeon: Sulaiman Akins MD;  Location:  OR       Social History     Socioeconomic History    Marital status:      Spouse name: Not on file    Number of children: Not on file    Years of education: Not on file    Highest education level: Not on file   Occupational History    Not on file   Tobacco Use    Smoking status: Never    Smokeless tobacco: Never   Substance and Sexual Activity    Alcohol use: Not on file    Drug use: Not on file    Sexual activity: Not on file   Other Topics Concern    Not on file   Social History Narrative    Not on file     Social Determinants of Health     Financial Resource Strain: Not on file   Food Insecurity: Not on file   Transportation Needs: Not on file   Physical Activity: Not on file   Stress: Not on file   Social Connections: Not on file   Intimate Partner Violence: Not on file   Housing Stability: Not on file       No family history on file.           Physical Examination:   Temp:  [97.5  F (36.4  C)-98.1  F (36.7  C)] 98.1  F (36.7  C)  Pulse:  [131-154] 131  Resp:  [19-45] 19  BP: (114-202)/() 184/101  SpO2:  [90 %-100 %] 100 %    Intake/Output Summary (Last 24 hours) at 8/25/2023 0908  Last data filed at 8/25/2023 0804  Gross per 24 hour   Intake 514.5 ml   Output 550 ml   Net -35.5 ml     Wt Readings from Last 4 Encounters:   08/25/23 124.8 kg (275 lb 2.2 oz)   06/15/22 133.6 kg (294 lb 9.6 oz)   06/09/22 133.8 kg (295 lb)   09/04/20 131.5 kg (290 lb)     BP - Mean:  [102-141] 132  Resp: 19    Recent Labs   Lab 08/25/23  0649 08/25/23  0204   PH 6.97*  --    PCO2 11*  --    PO2 147*  --    HCO3 3*  --    O2PER 21 0       GEN: no acute distress   HEENT: head ncat, sclera anicteric, normal mucus membranes,  trachea midline   PULM: Good AE bilaterally with no added sounds, tachypnea   CV/COR: Tachycardic. RRR NL S1S2 no gallop, no rub, no murmur  ABD: soft nontender, hypoactive bowel sounds, no mass  EXT:  No edema, warm  NEURO: Awake, alert, verbal and able to answer questions appropriately   SKIN: no obvious rash  LINES: clean, dry intact         Data:        ROUTINE ICU LABS (Last four results)  CMP  Recent Labs   Lab 08/25/23  0859 08/25/23  0832 08/25/23  0759 08/25/23  0655 08/25/23  0610 08/25/23  0429 08/25/23  0416 08/25/23  0310 08/25/23  0259 08/25/23  0223 08/25/23  0223 08/25/23  0214 08/25/23  0204   NA  --   --   --   --  140  --  133 143 134  --   --   --  138   POTASSIUM  --   --   --   --  3.7  --  >9.0* 4.0 >9.0*  --   --   --  4.3   CHLORIDE  --   --   --   --  104  --  118* 118* 117*  --   --   --  96*   CO2  --   --   --   --  4*  --   --   --   --   --   --   --  4*   ANIONGAP  --   --   --   --  32*  --   --   --   --   --   --   --  38*   * 192* 169* 303* 333*   < > 440* 463* 447*  519*   < >  --    < > 517*   BUN  --   --   --   --  24.6*  --  43* 23 49*  --   --   --  25.2*   CR  --   --   --   --  1.02  --  0.9 0.9 1.5*  --  1.4*   < > 1.33*   GFRESTIMATED  --   --   --   --  82  --   --   --   --   --  56*  --  60*   MARGE  --   --   --   --  8.5*  --   --   --   --   --   --   --  10.3*   MAG  --   --   --   --  2.3  --   --   --   --   --   --   --  2.8*   PHOS  --   --   --   --  3.1  --   --   --   --   --   --   --  5.8*   PROTTOTAL  --   --   --   --  7.6  --   --   --   --   --   --   --  9.0*   ALBUMIN  --   --   --   --  3.9  --   --   --   --   --   --   --  4.6   BILITOTAL  --   --   --   --  0.7  --   --   --   --   --   --   --  0.8   ALKPHOS  --   --   --   --  139*  --   --   --   --   --   --   --  175*   AST  --   --   --   --  45  --   --   --   --   --   --   --  52*   ALT  --   --   --   --  78*  --   --   --   --   --   --   --  92*    < > = values in this interval  not displayed.     CBC  Recent Labs   Lab 08/25/23  0610 08/25/23  0416 08/25/23  0310 08/25/23  0259 08/25/23  0204   WBC 20.7*  --   --   --  19.3*   RBC 4.69  --   --   --  5.06   HGB 16.3 15.3 14.3 18.4* 17.5   HCT 49.5 45 42 54* 54.5*   *  --   --   --  108*   MCH 34.8*  --   --   --  34.6*   MCHC 32.9  --   --   --  32.1   RDW 12.1  --   --   --  12.1     --   --   --  486*     INRNo lab results found in last 7 days.  Arterial Blood Gas  Recent Labs   Lab 08/25/23  0649 08/25/23  0204   PH 6.97*  --    PCO2 11*  --    PO2 147*  --    HCO3 3*  --    O2PER 21 0       All cultures:  No results for input(s): CULT in the last 168 hours.    Imaging:   Reviewed CT chest images performed today, no acute lung findings.         Assessment and plan :     Kalen Callejas IS a 63 year old male admitted on 8/25/2023 for progressive dyspnea and abdominal pain following prednisone initiation for asthma exacerbation, found to be in severe DKA, a new type II DM with A1C 10.5. He has no symptoms or radiologic findings on CT chest/abdomen to suggest infection. Trigger appears to be prednisone itself with undiagnosed diabetes. He also seems to be in ETOH withdrawal as he is hypertensive which is not expected in DKA. He had some response to labetalol IV initially.     DKA  - Continue insulin gtt for glucose < 200  - Add D5 Bicarb 150 meq gtt at 150 ml/hr (as pH is < 7.00)  - Continue current D5 1/2 NS with K @ 125 ml/hr  - VBG and BMP q4 hours x3   - Once venous pH is > 7.10, discontinue bicarb gtt and replace all IVF with D5LR with 20 K at 200 ml/hr   - Discontinue antibiotics   - Add serum osm to calculate osmolal gap, resulted at 35, added volatile screen     2. ETOH withdrawal   - Ativan 0.5 mg IV q4 PRN for now rather than CIWA protocol to minimize respiratory suppression in the setting of severe metabolic acidosis     3. Hypertensive urgency  - Labetalol IV PRN   - starting norvasc at 10 mg, holding off home  ACEI for JENAE    4. JENAE, mild  - Avoid nephrotoxins  - Renal dose meds    5. Moderate persistent asthma  PFTs 11/2019: FVC 3.97 (72%); FEV1 3.17 (77%); FEV1/FVC ratio 0.80 (105%). No obstruction. Slight restrictive pattern likely related to BMI. FeNO 25.   - Resume home pulmicort 180         I have personally reviewed the daily labs, imaging studies, cultures and discussed the case with referring physician and consulting physicians.     Daniel Church MD    Billing: This patient is critically ill: Yes. Total critical care time today not including procedures was 45 min.

## 2023-08-25 NOTE — PROGRESS NOTES
DATE/TIME OF CALL RECEIVED FROM LAB:  08/25/23 at 7:16 AM   LAB TEST:  ABG  LAB VALUE:  PH 6.97 PCO2 11 Bicarb 3   PROVIDER NOTIFIED?: Yes  PROVIDER NAME: Estee IZQUIERDO  DATE/TIME LAB VALUE REPORTED TO PROVIDER: 8/25 @ 0700  MECHANISM OF PROVIDER NOTIFICATION: Page  PROVIDER RESPONSE: Will review chart.    DATE/TIME OF CALL RECEIVED FROM LAB:  08/25/23 at 7:17 AM   LAB TEST:  ketones  LAB VALUE:  8.5  PROVIDER NOTIFIED?: Yes  PROVIDER NAME: Estee  DATE/TIME LAB VALUE REPORTED TO PROVIDER: 8/25 @0715  MECHANISM OF PROVIDER NOTIFICATION: Page  PROVIDER RESPONSE: Will review chart.

## 2023-08-25 NOTE — ED TRIAGE NOTES
"Arrives from home with wife. Required multiple people to remove him from the car.   States SOB for a week. States \"Its got worse\".         "

## 2023-08-25 NOTE — PROGRESS NOTES
DATE/TIME OF CALL RECEIVED FROM LAB:  08/25/23 at 0800 AM   LAB TEST:  Ketones, Venous blood Gasses  LAB VALUE:  Ketones: 7.60, PH 7.02, Bicarb: 6  PROVIDER NOTIFIED?: Yes  PROVIDER NAME: Sonali  DATE/TIME LAB VALUE REPORTED TO PROVIDER: 0800 8/25/23  MECHANISM OF PROVIDER NOTIFICATION: Face-To-Face by charge CYNTHIA James  and CYNTHIA Lancaster  PROVIDER RESPONSE: providing required treatment.

## 2023-08-26 LAB
ANION GAP SERPL CALCULATED.3IONS-SCNC: 14 MMOL/L (ref 7–15)
ANION GAP SERPL CALCULATED.3IONS-SCNC: 14 MMOL/L (ref 7–15)
BASOPHILS # BLD AUTO: 0 10E3/UL (ref 0–0.2)
BASOPHILS NFR BLD AUTO: 0 %
BUN SERPL-MCNC: 18.8 MG/DL (ref 8–23)
BUN SERPL-MCNC: 22 MG/DL (ref 8–23)
CALCIUM SERPL-MCNC: 9.1 MG/DL (ref 8.8–10.2)
CALCIUM SERPL-MCNC: 9.3 MG/DL (ref 8.8–10.2)
CHLORIDE SERPL-SCNC: 113 MMOL/L (ref 98–107)
CHLORIDE SERPL-SCNC: 114 MMOL/L (ref 98–107)
CREAT SERPL-MCNC: 0.87 MG/DL (ref 0.67–1.17)
CREAT SERPL-MCNC: 1.13 MG/DL (ref 0.67–1.17)
DEPRECATED HCO3 PLAS-SCNC: 17 MMOL/L (ref 22–29)
DEPRECATED HCO3 PLAS-SCNC: 18 MMOL/L (ref 22–29)
EOSINOPHIL # BLD AUTO: 0.1 10E3/UL (ref 0–0.7)
EOSINOPHIL NFR BLD AUTO: 1 %
ERYTHROCYTE [DISTWIDTH] IN BLOOD BY AUTOMATED COUNT: 12.4 % (ref 10–15)
GFR SERPL CREATININE-BSD FRML MDRD: 73 ML/MIN/1.73M2
GFR SERPL CREATININE-BSD FRML MDRD: >90 ML/MIN/1.73M2
GLUCOSE BLDC GLUCOMTR-MCNC: 114 MG/DL (ref 70–99)
GLUCOSE BLDC GLUCOMTR-MCNC: 126 MG/DL (ref 70–99)
GLUCOSE BLDC GLUCOMTR-MCNC: 128 MG/DL (ref 70–99)
GLUCOSE BLDC GLUCOMTR-MCNC: 132 MG/DL (ref 70–99)
GLUCOSE BLDC GLUCOMTR-MCNC: 137 MG/DL (ref 70–99)
GLUCOSE BLDC GLUCOMTR-MCNC: 142 MG/DL (ref 70–99)
GLUCOSE BLDC GLUCOMTR-MCNC: 146 MG/DL (ref 70–99)
GLUCOSE BLDC GLUCOMTR-MCNC: 149 MG/DL (ref 70–99)
GLUCOSE BLDC GLUCOMTR-MCNC: 157 MG/DL (ref 70–99)
GLUCOSE BLDC GLUCOMTR-MCNC: 165 MG/DL (ref 70–99)
GLUCOSE BLDC GLUCOMTR-MCNC: 168 MG/DL (ref 70–99)
GLUCOSE BLDC GLUCOMTR-MCNC: 173 MG/DL (ref 70–99)
GLUCOSE BLDC GLUCOMTR-MCNC: 183 MG/DL (ref 70–99)
GLUCOSE BLDC GLUCOMTR-MCNC: 191 MG/DL (ref 70–99)
GLUCOSE BLDC GLUCOMTR-MCNC: 192 MG/DL (ref 70–99)
GLUCOSE BLDC GLUCOMTR-MCNC: 200 MG/DL (ref 70–99)
GLUCOSE BLDC GLUCOMTR-MCNC: 206 MG/DL (ref 70–99)
GLUCOSE SERPL-MCNC: 138 MG/DL (ref 70–99)
GLUCOSE SERPL-MCNC: 195 MG/DL (ref 70–99)
HCT VFR BLD AUTO: 40 % (ref 40–53)
HGB BLD-MCNC: 12.9 G/DL (ref 13.3–17.7)
HGB BLD-MCNC: 14.2 G/DL (ref 13.3–17.7)
IMM GRANULOCYTES # BLD: 0.1 10E3/UL
IMM GRANULOCYTES NFR BLD: 1 %
LYMPHOCYTES # BLD AUTO: 0.9 10E3/UL (ref 0.8–5.3)
LYMPHOCYTES NFR BLD AUTO: 8 %
MAGNESIUM SERPL-MCNC: 1.8 MG/DL (ref 1.7–2.3)
MAGNESIUM SERPL-MCNC: 2 MG/DL (ref 1.7–2.3)
MCH RBC QN AUTO: 35.6 PG (ref 26.5–33)
MCHC RBC AUTO-ENTMCNC: 35.5 G/DL (ref 31.5–36.5)
MCV RBC AUTO: 100 FL (ref 78–100)
MONOCYTES # BLD AUTO: 0.9 10E3/UL (ref 0–1.3)
MONOCYTES NFR BLD AUTO: 9 %
NEUTROPHILS # BLD AUTO: 8.3 10E3/UL (ref 1.6–8.3)
NEUTROPHILS NFR BLD AUTO: 81 %
NRBC # BLD AUTO: 0 10E3/UL
NRBC BLD AUTO-RTO: 0 /100
PHOSPHATE SERPL-MCNC: 0.5 MG/DL (ref 2.5–4.5)
PHOSPHATE SERPL-MCNC: 1.2 MG/DL (ref 2.5–4.5)
PLAT MORPH BLD: NORMAL
PLATELET # BLD AUTO: 201 10E3/UL (ref 150–450)
POTASSIUM SERPL-SCNC: 2.9 MMOL/L (ref 3.4–5.3)
POTASSIUM SERPL-SCNC: 3.2 MMOL/L (ref 3.4–5.3)
POTASSIUM SERPL-SCNC: 3.2 MMOL/L (ref 3.4–5.3)
POTASSIUM SERPL-SCNC: 3.4 MMOL/L (ref 3.4–5.3)
RBC # BLD AUTO: 3.99 10E6/UL (ref 4.4–5.9)
RBC MORPH BLD: NORMAL
SODIUM SERPL-SCNC: 144 MMOL/L (ref 136–145)
SODIUM SERPL-SCNC: 146 MMOL/L (ref 136–145)
WBC # BLD AUTO: 10.3 10E3/UL (ref 4–11)

## 2023-08-26 PROCEDURE — 250N000011 HC RX IP 250 OP 636: Mod: JZ | Performed by: INTERNAL MEDICINE

## 2023-08-26 PROCEDURE — 120N000004 HC R&B MS OVERFLOW

## 2023-08-26 PROCEDURE — 84132 ASSAY OF SERUM POTASSIUM: CPT | Performed by: INTERNAL MEDICINE

## 2023-08-26 PROCEDURE — 250N000011 HC RX IP 250 OP 636: Performed by: INTERNAL MEDICINE

## 2023-08-26 PROCEDURE — 80048 BASIC METABOLIC PNL TOTAL CA: CPT | Performed by: INTERNAL MEDICINE

## 2023-08-26 PROCEDURE — 250N000011 HC RX IP 250 OP 636: Mod: JZ | Performed by: HOSPITALIST

## 2023-08-26 PROCEDURE — 83735 ASSAY OF MAGNESIUM: CPT | Performed by: INTERNAL MEDICINE

## 2023-08-26 PROCEDURE — 258N000003 HC RX IP 258 OP 636: Performed by: INTERNAL MEDICINE

## 2023-08-26 PROCEDURE — 99232 SBSQ HOSP IP/OBS MODERATE 35: CPT | Performed by: INTERNAL MEDICINE

## 2023-08-26 PROCEDURE — 250N000013 HC RX MED GY IP 250 OP 250 PS 637: Performed by: SURGERY

## 2023-08-26 PROCEDURE — 84100 ASSAY OF PHOSPHORUS: CPT | Performed by: INTERNAL MEDICINE

## 2023-08-26 PROCEDURE — 250N000013 HC RX MED GY IP 250 OP 250 PS 637: Performed by: INTERNAL MEDICINE

## 2023-08-26 PROCEDURE — 250N000009 HC RX 250: Performed by: INTERNAL MEDICINE

## 2023-08-26 PROCEDURE — 85025 COMPLETE CBC W/AUTO DIFF WBC: CPT | Performed by: INTERNAL MEDICINE

## 2023-08-26 PROCEDURE — 258N000001 HC RX 258: Performed by: SURGERY

## 2023-08-26 PROCEDURE — 36415 COLL VENOUS BLD VENIPUNCTURE: CPT | Performed by: INTERNAL MEDICINE

## 2023-08-26 PROCEDURE — 36415 COLL VENOUS BLD VENIPUNCTURE: CPT | Performed by: HOSPITALIST

## 2023-08-26 PROCEDURE — 85018 HEMOGLOBIN: CPT | Performed by: HOSPITALIST

## 2023-08-26 PROCEDURE — 250N000012 HC RX MED GY IP 250 OP 636 PS 637: Performed by: INTERNAL MEDICINE

## 2023-08-26 PROCEDURE — C9113 INJ PANTOPRAZOLE SODIUM, VIA: HCPCS | Mod: JZ | Performed by: HOSPITALIST

## 2023-08-26 PROCEDURE — 94640 AIRWAY INHALATION TREATMENT: CPT

## 2023-08-26 PROCEDURE — 999N000157 HC STATISTIC RCP TIME EA 10 MIN

## 2023-08-26 RX ORDER — NICOTINE POLACRILEX 4 MG
15-30 LOZENGE BUCCAL
Status: DISCONTINUED | OUTPATIENT
Start: 2023-08-26 | End: 2023-08-26

## 2023-08-26 RX ORDER — POTASSIUM CHLORIDE 1500 MG/1
40 TABLET, EXTENDED RELEASE ORAL ONCE
Status: COMPLETED | OUTPATIENT
Start: 2023-08-26 | End: 2023-08-26

## 2023-08-26 RX ORDER — ACETAMINOPHEN 325 MG/1
650 TABLET ORAL ONCE
Status: DISCONTINUED | OUTPATIENT
Start: 2023-08-26 | End: 2023-08-29 | Stop reason: HOSPADM

## 2023-08-26 RX ORDER — LIDOCAINE 40 MG/G
CREAM TOPICAL
Status: DISCONTINUED | OUTPATIENT
Start: 2023-08-26 | End: 2023-08-29 | Stop reason: HOSPADM

## 2023-08-26 RX ORDER — DEXTROSE MONOHYDRATE 25 G/50ML
25-50 INJECTION, SOLUTION INTRAVENOUS
Status: DISCONTINUED | OUTPATIENT
Start: 2023-08-26 | End: 2023-08-26

## 2023-08-26 RX ORDER — IBUPROFEN 600 MG/1
600 TABLET, FILM COATED ORAL ONCE
Status: COMPLETED | OUTPATIENT
Start: 2023-08-26 | End: 2023-08-26

## 2023-08-26 RX ORDER — NITROGLYCERIN 0.4 MG/1
0.4 TABLET SUBLINGUAL EVERY 5 MIN PRN
Status: DISCONTINUED | OUTPATIENT
Start: 2023-08-26 | End: 2023-08-29 | Stop reason: HOSPADM

## 2023-08-26 RX ORDER — POTASSIUM CHLORIDE 1500 MG/1
20 TABLET, EXTENDED RELEASE ORAL ONCE
Status: COMPLETED | OUTPATIENT
Start: 2023-08-26 | End: 2023-08-26

## 2023-08-26 RX ADMIN — INSULIN ASPART 4 UNITS: 100 INJECTION, SOLUTION INTRAVENOUS; SUBCUTANEOUS at 17:48

## 2023-08-26 RX ADMIN — POTASSIUM CHLORIDE 40 MEQ: 1500 TABLET, EXTENDED RELEASE ORAL at 08:28

## 2023-08-26 RX ADMIN — INSULIN ASPART 2 UNITS: 100 INJECTION, SOLUTION INTRAVENOUS; SUBCUTANEOUS at 13:00

## 2023-08-26 RX ADMIN — SODIUM PHOSPHATE, MONOBASIC, MONOHYDRATE AND SODIUM PHOSPHATE, DIBASIC, ANHYDROUS 15 MMOL: 142; 276 INJECTION, SOLUTION INTRAVENOUS at 10:27

## 2023-08-26 RX ADMIN — LEVALBUTEROL HYDROCHLORIDE 0.63 MG: 0.63 SOLUTION RESPIRATORY (INHALATION) at 17:14

## 2023-08-26 RX ADMIN — SODIUM PHOSPHATE, MONOBASIC, MONOHYDRATE AND SODIUM PHOSPHATE, DIBASIC, ANHYDROUS 15 MMOL: 142; 276 INJECTION, SOLUTION INTRAVENOUS at 17:46

## 2023-08-26 RX ADMIN — LORAZEPAM 0.5 MG: 2 INJECTION INTRAMUSCULAR; INTRAVENOUS at 13:50

## 2023-08-26 RX ADMIN — PANTOPRAZOLE SODIUM 40 MG: 40 INJECTION, POWDER, FOR SOLUTION INTRAVENOUS at 22:04

## 2023-08-26 RX ADMIN — ENOXAPARIN SODIUM 40 MG: 40 INJECTION SUBCUTANEOUS at 08:04

## 2023-08-26 RX ADMIN — HYDRALAZINE HYDROCHLORIDE 10 MG: 20 INJECTION, SOLUTION INTRAMUSCULAR; INTRAVENOUS at 01:10

## 2023-08-26 RX ADMIN — AMLODIPINE BESYLATE 10 MG: 10 TABLET ORAL at 08:04

## 2023-08-26 RX ADMIN — FLUTICASONE FUROATE 1 PUFF: 100 POWDER RESPIRATORY (INHALATION) at 08:04

## 2023-08-26 RX ADMIN — SODIUM PHOSPHATE, MONOBASIC, MONOHYDRATE AND SODIUM PHOSPHATE, DIBASIC, ANHYDROUS 15 MMOL: 142; 276 INJECTION, SOLUTION INTRAVENOUS at 08:33

## 2023-08-26 RX ADMIN — SODIUM PHOSPHATE, MONOBASIC, MONOHYDRATE AND SODIUM PHOSPHATE, DIBASIC, ANHYDROUS 15 MMOL: 142; 276 INJECTION, SOLUTION INTRAVENOUS at 15:36

## 2023-08-26 RX ADMIN — LORAZEPAM 0.5 MG: 2 INJECTION INTRAMUSCULAR; INTRAVENOUS at 08:44

## 2023-08-26 RX ADMIN — DEXTROSE MONOHYDRATE, SODIUM CHLORIDE, SODIUM LACTATE, POTASSIUM CHLORIDE, CALCIUM CHLORIDE: 5; 600; 310; 179; 20 INJECTION, SOLUTION INTRAVENOUS at 09:05

## 2023-08-26 RX ADMIN — INSULIN GLARGINE 20 UNITS: 100 INJECTION, SOLUTION SUBCUTANEOUS at 22:04

## 2023-08-26 RX ADMIN — POTASSIUM CHLORIDE 20 MEQ: 1500 TABLET, EXTENDED RELEASE ORAL at 11:23

## 2023-08-26 RX ADMIN — POTASSIUM CHLORIDE 40 MEQ: 1500 TABLET, EXTENDED RELEASE ORAL at 22:04

## 2023-08-26 RX ADMIN — INSULIN GLARGINE 20 UNITS: 100 INJECTION, SOLUTION SUBCUTANEOUS at 11:48

## 2023-08-26 RX ADMIN — IBUPROFEN 600 MG: 600 TABLET, FILM COATED ORAL at 00:56

## 2023-08-26 RX ADMIN — POTASSIUM CHLORIDE 40 MEQ: 1500 TABLET, EXTENDED RELEASE ORAL at 15:42

## 2023-08-26 ASSESSMENT — ACTIVITIES OF DAILY LIVING (ADL)
ADLS_ACUITY_SCORE: 26
ADLS_ACUITY_SCORE: 28
ADLS_ACUITY_SCORE: 26
ADLS_ACUITY_SCORE: 28
ADLS_ACUITY_SCORE: 26
ADLS_ACUITY_SCORE: 28
ADLS_ACUITY_SCORE: 26

## 2023-08-26 NOTE — PROGRESS NOTES
[facility]    Hospitalist Progress Note  Provider : Kari Fontanez MD, MD  Date of Service (when I saw the patient): 08/26/2023    Assessment & Plan   Kalen Callejas is a 63 year old male admitted on 8/25/2023. He has a history of obesity, asthma was brought in due to shortness of breath which has been going on for couple of days.  He was so short of breath that he could barely walk. His symptoms started a week ago. He denies any fevers or chills.  Initially he thought his symptoms were due to asthma.  He has had nausea and emesis which has been clear.  He denies any abdominal pain.  He denies any chest pain or dizziness or focal weakness.  He denies any blurry vision.  He has been having polyuria and polydipsia for more than a week.  He has no prior history of diabetes.   He stated that he had a backup prescription of prednisone for his asthma which was prescribed for him about a year ago. He stated that he took prednisone 20 mg for 5 days.    In the ER over here when he presented he was markedly tachypneic and tachycardic.  He was noted to have marked anion gap metabolic acidosis with a pH of 6.84, lactic acid of 6.1 and ketones greater than 9.  He was given 3 L of crystalloids, IV Zosyn, bicarbonate. A CT of the chest abdomen pelvis showed diffuse esophageal wall thickening which can be seen in diffuse esophagitis and a 1 cm left adrenal nodule similar to June 2022 which could represent adrenal adenoma. He has been initiated on an insulin drip.   He was admitted to ICU for further management.      Diabetic ketoacidosis  New diagnosis of diabetes mellitus  No prior diagnosis of diabetes mellitus. Hgb A1c 10.5 on admission  -Initial BMP showed bicarb 4, creatinine 1.33, phos 5.8, ALT 92, AST 52, glucose 517, ketones > 9, lactic acid 5.6, A1C 10.5  -Procalcitonin 0.21, troponin T high sensitivity 18  -Received 3 Liters normal saline in the ER and admitted with 1/2NS with KCl 20 meq  at 125 ml/hr  -Continue D5W with bicarb 150 meq at 150 ml/hr.   -Will replace electrolytes as needed  -Will order Lantus insulin 20 units bid. Will also order Novolog 1 unit per 10 gram CHO tid, medium resistance insulin sliding scale    Hypokalemia  Hypophosphatemia  Suspect multifactorial cause including DKA, alcohol use  Will replace electrolytes as needed     Hypertensive urgency  -His blood pressure was in 200s on arrival to emergency room. BP improving   -Continue prn hydralazine 10 mg IV q4 hrs prn for SBP >180  -Started on amlodipine   -Echocardiogram showed ejection fraction is 60-65%.The right ventricle is normal in structure, function and size.Borderline aortic root dilatation.The ascending aorta is Mildly dilated.  -Will switch to  ACE-I or ARB given new diagnosis of diabetes  -Needs aggressive blood pressure control     History of asthma  -Completed 5 day course of prednisone. Breathing stable today  -Will have prn Xopenex neb available     Lactic Acidosis.  SIRS likely due to DKA  -No clear evidence of infection   -has leucocytosis but it could be due to prednisone and also DKA  -Urinalysis negative. No evidence of pneumonia. Procalcitonin unremarkable  -No antibiotic needed at this time      Shortness of breath, improving  -likely due to DKA.  -No significant wheezing at this time. Will order Xopenex neb prn   -Will continue treatment for DKA as above   -Will monitor on tele and get TTE.    DVT Prophylaxis: Enoxaparin subcutaneous    Code Status: Full Code    Disposition: Expected discharge : anticipate more than 2 nights of hospital course until DKA resolves. Will transfer to [facility]    Kari Fontanez MD    Interval History   Patient seen and examined today. He stated that he is feeling better. He has no nausea or vomiting. Denies feeling anxious. No shortness of breath. Also denies abdominal pain     -Data reviewed today: I reviewed all new labs and imaging results over the last 24 hours. I personally  reviewed     Physical Exam   Temp: 97.4  F (36.3  C) Temp src: Temporal BP: (!) 141/74 Pulse: 117   Resp: 17 SpO2: 98 % O2 Device: None (Room air)    Vitals:    08/25/23 0600   Weight: 124.8 kg (275 lb 2.2 oz)     Vital Signs with Ranges  Temp:  [97.3  F (36.3  C)-98.3  F (36.8  C)] 97.4  F (36.3  C)  Pulse:  [] 117  Resp:  [14-36] 17  BP: (106-190)/() 141/74  SpO2:  [97 %-100 %] 98 %  I/O last 3 completed shifts:  In: 3175.32 [I.V.:3175.32]  Out: 2730 [Urine:2730]    GEN:  Alert, oriented x 3, appears comfortable, NAD.  HEENT:  Normocephalic/atraumatic, no scleral icterus, no nasal discharge, mouth moist.  CV:  Regular rate and rhythm, no murmur or JVD.  S1 + S2 noted, no S3 or S4.  LUNGS:  Clear to auscultation bilaterally without rales/rhonchi/wheezing/retractions.  Symmetric chest rise on inhalation noted.  ABD:  Active bowel sounds, soft, non-tender/non-distended.  No rebound/guarding/rigidity.  EXT:  No edema or cyanosis.  Hands/feet warm to touch with good signs of peripheral perfusion.  No joint synovitis noted.  SKIN:  Dry to touch, no exanthems noted in the visualized areas.  NEURO:  Symmetric muscle strength, sensation to touch grossly intact.  No new focal deficits appreciated.    Medications    dextrose 5% lactated ringers + KCl 20 mEq 125 mL/hr at 08/26/23 0700    insulin 2 Units/hr (08/26/23 0600)      acetaminophen  650 mg Oral Once    amLODIPine  10 mg Oral Daily    enoxaparin ANTICOAGULANT  40 mg Subcutaneous Q24H    fluticasone  1 puff Inhalation Daily    potassium chloride  20 mEq Oral Once    sodium phosphate  15 mmol Intravenous Q2H       Data   Recent Labs   Lab 08/26/23  0751 08/26/23  0700 08/26/23  0600 08/26/23  0527 08/25/23  2109 08/25/23  2032 08/25/23  1701 08/25/23  1620 08/25/23  0655 08/25/23  0610 08/25/23  0429 08/25/23  0416 08/25/23  0214 08/25/23  0204   WBC  --   --   --  10.3  --   --   --   --   --  20.7*  --   --   --  19.3*   HGB  --   --   --  14.2  --   --    --   --   --  16.3  --  15.3   < > 17.5   MCV  --   --   --  100  --   --   --   --   --  106*  --   --   --  108*   PLT  --   --   --  201  --   --   --   --   --  397  --   --   --  486*   NA  --   --   --  146*  --  143  --  142   < > 140  --  133   < > 138   POTASSIUM  --   --   --  2.9*  --  3.2*  --  3.3*   < > 3.7  --  >9.0*   < > 4.3   CHLORIDE  --   --   --  114*  --  113*  --  113*   < > 104  --  118*   < > 96*   CO2  --   --   --  18*  --  15*  --  13*   < > 4*  --   --   --  4*   BUN  --   --   --  22.0  --  23.8*  --  25.8*   < > 24.6*  --  43*   < > 25.2*   CR  --   --   --  1.13  --  1.07  --  1.03   < > 1.02  --  0.9   < > 1.33*   ANIONGAP  --   --   --  14  --  15  --  16*   < > 32*  --   --   --  38*   MARGE  --   --   --  9.3  --  9.1  --  8.5*   < > 8.5*  --   --   --  10.3*   * 149* 142* 138*   < > 113*   < > 155*   < > 333*   < > 440*   < > 517*   ALBUMIN  --   --   --   --   --   --   --   --   --  3.9  --   --   --  4.6   PROTTOTAL  --   --   --   --   --   --   --   --   --  7.6  --   --   --  9.0*   BILITOTAL  --   --   --   --   --   --   --   --   --  0.7  --   --   --  0.8   ALKPHOS  --   --   --   --   --   --   --   --   --  139*  --   --   --  175*   ALT  --   --   --   --   --   --   --   --   --  78*  --   --   --  92*   AST  --   --   --   --   --   --   --   --   --  45  --   --   --  52*   LIPASE  --   --   --   --   --   --   --   --   --   --   --   --   --  28    < > = values in this interval not displayed.         Recent Results (from the past 24 hour(s))   Echocardiogram Complete   Result Value    LVEF  60-65%    [facility]    [id number]  [id number]  [id number]^MARLON^KELSEY     [facility]  Echocardiography Laboratory  [address] [place] [address]     Name: LESLIE PRESTON  MRN: [id number]  : 1960  Study Date: 2023 12:25 PM  Age: 63 yrs  Gender: Male  Patient Location: [facility]  Reason For Study: SOB  Ordering Physician:  ROSE MARIE MARTINEZ  Performed By: Shannan Sarmiento     BSA: 2.5 m2  Height: 74 in  Weight: 275 lb  HR: 90  BP: 144/90 mmHg  ______________________________________________________________________________  Procedure  Complete Portable Echo Adult. Optison ([facility]C #1063-4507) given intravenously.  ______________________________________________________________________________  Interpretation Summary     The visual ejection fraction is 60-65%.  The right ventricle is normal in structure, function and size.  Borderline aortic root dilatation.  The ascending aorta is Mildly dilated.  There is no comparison study available. The study was technically difficult.  Optison contrast was used without apparent complications.  ______________________________________________________________________________  Left Ventricle  The left ventricle is normal in structure, function and size. There is normal  left ventricular wall thickness. Left ventricular systolic function is normal.  The visual ejection fraction is 60-65%. Diastolic Doppler findings (E/E' ratio  and/or other parameters) suggest left ventricular filling pressures are  normal. No regional wall motion abnormalities noted.     Right Ventricle  The right ventricle is normal in structure, function and size.     Atria  Normal left atrial size. Right atrial size is normal. There is no color  Doppler evidence of an atrial shunt.     Mitral Valve  The mitral valve is normal in structure and function. There is trace mitral  regurgitation.     Tricuspid Valve  The tricuspid valve is normal in structure and function. There is trace  tricuspid regurgitation. The right ventricular systolic pressure is  approximated at 22.5 mmHg plus the right atrial pressure.     Aortic Valve  There is mild trileaflet aortic sclerosis. No aortic regurgitation is present.     Pulmonic Valve  The pulmonic valve is not well visualized.     Vessels  Borderline aortic root dilatation. The ascending aorta is Mildly  dilated.     Pericardium  There is no pericardial effusion.     Rhythm  Sinus rhythm was noted.  ______________________________________________________________________________  MMode/2D Measurements & Calculations     IVSd: 1.0 cm  LVIDd: 5.2 cm  LVIDs: 3.9 cm  LVPWd: 1.1 cm  IVC diam: 2.3 cm  FS: 26.1 %  LV mass(C)d: 207.6 grams  LV mass(C)dI: 83.4 grams/m2  Ao root diam: 3.9 cm  LA dimension: 4.3 cm  asc Aorta Diam: 4.0 cm  LA/Ao: 1.1  LVOT diam: 2.3 cm  LVOT area: 4.0 cm2  Ao root diam Index (cm/m2): 1.6  asc Aorta Diam Index (cm/m2): 1.6  LA Volume (BP): 63.2 ml     LA Volume Index (BP): 25.4 ml/m2  RV Base: 3.3 cm  RWT: 0.40  TAPSE: 1.7 cm     Doppler Measurements & Calculations  MV E max jason: 78.1 cm/sec  MV A max jason: 113.2 cm/sec  MV E/A: 0.69  MV max PG: 3.6 mmHg  MV mean P.1 mmHg  MV V2 VTI: 23.9 cm  MVA(VTI): 3.8 cm2  MV P1/2t max jason: 109.1 cm/sec  MV P1/2t: 51.8 msec  MVA(P1/2t): 4.3 cm2  MV dec slope: 617.3 cm/sec2  MV dec time: 0.19 sec  Ao V2 max: 189.0 cm/sec  Ao max P.0 mmHg  Ao V2 mean: 132.0 cm/sec  Ao mean P.0 mmHg  Ao V2 VTI: 34.5 cm  VIK(I,D): 2.7 cm2  VIK(V,D): 2.7 cm2  LV V1 max P.4 mmHg  LV V1 max: 126.0 cm/sec  LV V1 VTI: 22.6 cm  SV(LVOT): 91.5 ml  SI(LVOT): 36.8 ml/m2  PA V2 max: 106.0 cm/sec  PA max P.5 mmHg  PA mean PG: 3.0 mmHg  PA V2 VTI: 18.9 cm  PA acc time: 0.13 sec  TR max jason: 237.4 cm/sec  TR max P.5 mmHg  AV Jason Ratio (DI): 0.67  VIK Index (cm2/m2): 1.1     E/E' av.1  Lateral E/e': 6.6  Medial E/e': 9.7  RV S Jason: 12.9 cm/sec     ______________________________________________________________________________  Report approved by: Nba Way 2023 01:59 PM

## 2023-08-26 NOTE — PLAN OF CARE
Patient afebrile, no pain.  Alert and orientated, forgetful to time.  Cardiac: Tele: ST. . BP more stable today residing in the 140's.   Respiratory: LS clear. Patient felt wheezy in the afternoon and requested nebulizer before dinner. Patient reports that it helped him.  RA 98%.  :  voiding normal.   GI:  Started on a Mod. Carb diet late morning. Poor appetite 25% of lunch. Stopped insulin gtt. At 1400 and transitioned to Lantus, sliding scale and carb coverage.  BG checks ACHS.  1900 had a black stool x 2. Passing on to next shift.  Lines: PIV x 2  Drips: IV fluids stopped afternoon.  Replacing electrolytes today. See flowsheet for values.     Received Ativan x 2 for anxiety.     Patient improving and has been made an overflow, can transfer out.

## 2023-08-26 NOTE — PLAN OF CARE
ICU End of Shift Summary.  For vital signs and complete assessments, please see documentation flowsheets.        Pertinent assessments:   Neuro: A/o x4 with intermittent forgetfulness- time. C/O pain 1x d/t HA, relief with ordered meds. Afebrile.   Cardiac: -120's with occasional PVCs. BP HTN, PRN x1  Resp: LS clear, tachypneic with exertion.   GI: BS normoactive, No BM this shift  : Voiding spontaneously.   Skin: Scattered bruising bilaterally on arms  Lines: 2 PIV  Drips: Insulin, TKO and D5 LR 20KCl    Major Shift Events:   ~Pt accidentally pulled R AC IV out early this shift. Maintenance fluids changed over to R wrist IV. Pt c/o pain in R wrist, decreased fluids, pain continued, IV infiltrated. Both R AC and R wrist IVs pulled. Multiple attempts at IV failed with vein finder. Ultrasound IV placed around 0145 in L forearm. Restarted maintenance fluids. 0445, IV infiltrated, slight edema and cool skin around IV site. Fluids stopped. Call for new IV to be ultrasound placed. 2nd US guided IV placed around 0530 in L upper AC. Called telehub, ordered maintenance fluids to be decreased.     Plan (Upcoming Events): Con't to monitor BS and titration of insulin. Monitor VS and labs.   Discharge/Transfer Needs: TBD     Bedside Shift Report Completed : Yes  Bedside Safety Check Completed: Yes

## 2023-08-27 LAB
ANION GAP SERPL CALCULATED.3IONS-SCNC: 13 MMOL/L (ref 7–15)
BACTERIA UR CULT: NO GROWTH
BUN SERPL-MCNC: 14.9 MG/DL (ref 8–23)
CALCIUM SERPL-MCNC: 8.9 MG/DL (ref 8.8–10.2)
CHLORIDE SERPL-SCNC: 112 MMOL/L (ref 98–107)
CREAT SERPL-MCNC: 0.86 MG/DL (ref 0.67–1.17)
DEPRECATED HCO3 PLAS-SCNC: 21 MMOL/L (ref 22–29)
ERYTHROCYTE [DISTWIDTH] IN BLOOD BY AUTOMATED COUNT: 12.6 % (ref 10–15)
GFR SERPL CREATININE-BSD FRML MDRD: >90 ML/MIN/1.73M2
GLUCOSE BLDC GLUCOMTR-MCNC: 186 MG/DL (ref 70–99)
GLUCOSE BLDC GLUCOMTR-MCNC: 190 MG/DL (ref 70–99)
GLUCOSE BLDC GLUCOMTR-MCNC: 207 MG/DL (ref 70–99)
GLUCOSE BLDC GLUCOMTR-MCNC: 211 MG/DL (ref 70–99)
GLUCOSE BLDC GLUCOMTR-MCNC: 216 MG/DL (ref 70–99)
GLUCOSE SERPL-MCNC: 216 MG/DL (ref 70–99)
HCT VFR BLD AUTO: 36.2 % (ref 40–53)
HEMOCCULT STL QL: POSITIVE
HGB BLD-MCNC: 12.5 G/DL (ref 13.3–17.7)
HGB BLD-MCNC: 12.5 G/DL (ref 13.3–17.7)
HGB BLD-MCNC: 13.2 G/DL (ref 13.3–17.7)
HGB BLD-MCNC: 13.7 G/DL (ref 13.3–17.7)
MAGNESIUM SERPL-MCNC: 1.8 MG/DL (ref 1.7–2.3)
MCH RBC QN AUTO: 34.9 PG (ref 26.5–33)
MCHC RBC AUTO-ENTMCNC: 34.5 G/DL (ref 31.5–36.5)
MCV RBC AUTO: 101 FL (ref 78–100)
PHOSPHATE SERPL-MCNC: 1.7 MG/DL (ref 2.5–4.5)
PHOSPHATE SERPL-MCNC: 2 MG/DL (ref 2.5–4.5)
PHOSPHATE SERPL-MCNC: 2.3 MG/DL (ref 2.5–4.5)
PHOSPHATE SERPL-MCNC: 2.5 MG/DL (ref 2.5–4.5)
PLATELET # BLD AUTO: 150 10E3/UL (ref 150–450)
POTASSIUM SERPL-SCNC: 3.1 MMOL/L (ref 3.4–5.3)
POTASSIUM SERPL-SCNC: 3.2 MMOL/L (ref 3.4–5.3)
POTASSIUM SERPL-SCNC: 3.2 MMOL/L (ref 3.4–5.3)
POTASSIUM SERPL-SCNC: 3.5 MMOL/L (ref 3.4–5.3)
POTASSIUM SERPL-SCNC: 3.9 MMOL/L (ref 3.4–5.3)
RBC # BLD AUTO: 3.58 10E6/UL (ref 4.4–5.9)
SODIUM SERPL-SCNC: 146 MMOL/L (ref 136–145)
WBC # BLD AUTO: 5 10E3/UL (ref 4–11)

## 2023-08-27 PROCEDURE — 250N000011 HC RX IP 250 OP 636: Performed by: INTERNAL MEDICINE

## 2023-08-27 PROCEDURE — 84100 ASSAY OF PHOSPHORUS: CPT | Performed by: INTERNAL MEDICINE

## 2023-08-27 PROCEDURE — 82272 OCCULT BLD FECES 1-3 TESTS: CPT | Performed by: HOSPITALIST

## 2023-08-27 PROCEDURE — 85018 HEMOGLOBIN: CPT | Performed by: INTERNAL MEDICINE

## 2023-08-27 PROCEDURE — 84132 ASSAY OF SERUM POTASSIUM: CPT | Performed by: INTERNAL MEDICINE

## 2023-08-27 PROCEDURE — 83735 ASSAY OF MAGNESIUM: CPT | Performed by: INTERNAL MEDICINE

## 2023-08-27 PROCEDURE — 120N000001 HC R&B MED SURG/OB

## 2023-08-27 PROCEDURE — 99232 SBSQ HOSP IP/OBS MODERATE 35: CPT | Performed by: INTERNAL MEDICINE

## 2023-08-27 PROCEDURE — 250N000009 HC RX 250: Performed by: INTERNAL MEDICINE

## 2023-08-27 PROCEDURE — 258N000003 HC RX IP 258 OP 636: Performed by: INTERNAL MEDICINE

## 2023-08-27 PROCEDURE — C9113 INJ PANTOPRAZOLE SODIUM, VIA: HCPCS | Mod: JZ | Performed by: HOSPITALIST

## 2023-08-27 PROCEDURE — 94640 AIRWAY INHALATION TREATMENT: CPT | Mod: 76

## 2023-08-27 PROCEDURE — 999N000157 HC STATISTIC RCP TIME EA 10 MIN

## 2023-08-27 PROCEDURE — 85027 COMPLETE CBC AUTOMATED: CPT | Performed by: INTERNAL MEDICINE

## 2023-08-27 PROCEDURE — 250N000011 HC RX IP 250 OP 636: Mod: JZ | Performed by: HOSPITALIST

## 2023-08-27 PROCEDURE — 36415 COLL VENOUS BLD VENIPUNCTURE: CPT | Performed by: INTERNAL MEDICINE

## 2023-08-27 PROCEDURE — 94640 AIRWAY INHALATION TREATMENT: CPT

## 2023-08-27 PROCEDURE — 250N000013 HC RX MED GY IP 250 OP 250 PS 637: Performed by: INTERNAL MEDICINE

## 2023-08-27 RX ORDER — POTASSIUM CHLORIDE 1.5 G/1.58G
40 POWDER, FOR SOLUTION ORAL ONCE
Status: COMPLETED | OUTPATIENT
Start: 2023-08-27 | End: 2023-08-27

## 2023-08-27 RX ORDER — POTASSIUM CHLORIDE 1500 MG/1
40 TABLET, EXTENDED RELEASE ORAL ONCE
Status: COMPLETED | OUTPATIENT
Start: 2023-08-27 | End: 2023-08-27

## 2023-08-27 RX ADMIN — LEVALBUTEROL HYDROCHLORIDE 0.63 MG: 0.63 SOLUTION RESPIRATORY (INHALATION) at 07:58

## 2023-08-27 RX ADMIN — INSULIN ASPART 4 UNITS: 100 INJECTION, SOLUTION INTRAVENOUS; SUBCUTANEOUS at 12:30

## 2023-08-27 RX ADMIN — LORAZEPAM 0.5 MG: 2 INJECTION INTRAMUSCULAR; INTRAVENOUS at 22:48

## 2023-08-27 RX ADMIN — LEVALBUTEROL HYDROCHLORIDE 0.63 MG: 0.63 SOLUTION RESPIRATORY (INHALATION) at 22:44

## 2023-08-27 RX ADMIN — PANTOPRAZOLE SODIUM 40 MG: 40 INJECTION, POWDER, FOR SOLUTION INTRAVENOUS at 21:24

## 2023-08-27 RX ADMIN — POTASSIUM CHLORIDE 40 MEQ: 1500 TABLET, EXTENDED RELEASE ORAL at 16:39

## 2023-08-27 RX ADMIN — SODIUM PHOSPHATE, MONOBASIC, MONOHYDRATE AND SODIUM PHOSPHATE, DIBASIC, ANHYDROUS 15 MMOL: 142; 276 INJECTION, SOLUTION INTRAVENOUS at 12:21

## 2023-08-27 RX ADMIN — INSULIN GLARGINE 20 UNITS: 100 INJECTION, SOLUTION SUBCUTANEOUS at 21:35

## 2023-08-27 RX ADMIN — SODIUM PHOSPHATE, MONOBASIC, MONOHYDRATE AND SODIUM PHOSPHATE, DIBASIC, ANHYDROUS 15 MMOL: 142; 276 INJECTION, SOLUTION INTRAVENOUS at 02:01

## 2023-08-27 RX ADMIN — POTASSIUM CHLORIDE 40 MEQ: 1500 TABLET, EXTENDED RELEASE ORAL at 10:08

## 2023-08-27 RX ADMIN — PANTOPRAZOLE SODIUM 40 MG: 40 INJECTION, POWDER, FOR SOLUTION INTRAVENOUS at 09:24

## 2023-08-27 RX ADMIN — INSULIN ASPART 2 UNITS: 100 INJECTION, SOLUTION INTRAVENOUS; SUBCUTANEOUS at 19:15

## 2023-08-27 RX ADMIN — POTASSIUM CHLORIDE 40 MEQ: 1.5 POWDER, FOR SOLUTION ORAL at 03:26

## 2023-08-27 RX ADMIN — FLUTICASONE FUROATE 1 PUFF: 100 POWDER RESPIRATORY (INHALATION) at 07:58

## 2023-08-27 RX ADMIN — INSULIN GLARGINE 20 UNITS: 100 INJECTION, SOLUTION SUBCUTANEOUS at 09:24

## 2023-08-27 RX ADMIN — INSULIN ASPART 3 UNITS: 100 INJECTION, SOLUTION INTRAVENOUS; SUBCUTANEOUS at 07:46

## 2023-08-27 RX ADMIN — AMLODIPINE BESYLATE 10 MG: 10 TABLET ORAL at 09:24

## 2023-08-27 RX ADMIN — SODIUM PHOSPHATE, MONOBASIC, MONOHYDRATE AND SODIUM PHOSPHATE, DIBASIC, ANHYDROUS 15 MMOL: 142; 276 INJECTION, SOLUTION INTRAVENOUS at 10:09

## 2023-08-27 RX ADMIN — LEVALBUTEROL HYDROCHLORIDE 0.63 MG: 0.63 SOLUTION RESPIRATORY (INHALATION) at 12:49

## 2023-08-27 ASSESSMENT — ACTIVITIES OF DAILY LIVING (ADL)
ADLS_ACUITY_SCORE: 28
ADLS_ACUITY_SCORE: 28
ADLS_ACUITY_SCORE: 26
ADLS_ACUITY_SCORE: 28
ADLS_ACUITY_SCORE: 28
ADLS_ACUITY_SCORE: 26
ADLS_ACUITY_SCORE: 28
ADLS_ACUITY_SCORE: 26
ADLS_ACUITY_SCORE: 26
ADLS_ACUITY_SCORE: 28

## 2023-08-27 NOTE — PROGRESS NOTES
Patient Transfer Information  Patient connected to monitoring equipment on arrival: yes Vital signs monitor, tele     Patient connected to wall oxygen on arrival: N/A    Belongings: Transferred with patient    Safety check completed: Yes

## 2023-08-27 NOTE — PROGRESS NOTES
[facility]    Hospitalist Progress Note  Provider : Kari Fontanez MD, MD  Date of Service (when I saw the patient): 08/27/2023    Assessment & Plan   Kalen Callejas is a 63 year old male admitted on 8/25/2023. He has a history of obesity, asthma was brought in due to shortness of breath which has been going on for couple of days.  He was so short of breath that he could barely walk. His symptoms started a week ago. He denies any fevers or chills.  Initially he thought his symptoms were due to asthma.  He has had nausea and emesis which has been clear.  He denies any abdominal pain.  He denies any chest pain or dizziness or focal weakness.  He denies any blurry vision.  He has been having polyuria and polydipsia for more than a week.  He has no prior history of diabetes.   He stated that he had a backup prescription of prednisone for his asthma which was prescribed for him about a year ago. He stated that he took prednisone 20 mg for 5 days.    In the ER over here when he presented he was markedly tachypneic and tachycardic.  He was noted to have marked anion gap metabolic acidosis with a pH of 6.84, lactic acid of 6.1 and ketones greater than 9.  He was given 3 L of crystalloids, IV Zosyn, bicarbonate. A CT of the chest abdomen pelvis showed diffuse esophageal wall thickening which can be seen in diffuse esophagitis and a 1 cm left adrenal nodule similar to June 2022 which could represent adrenal adenoma. He has been initiated on an insulin drip.   He was admitted to ICU for further management.      Diabetic ketoacidosis  New diagnosis of diabetes mellitus  -No prior diagnosis of diabetes mellitus. Hgb A1c 10.5 on admission  -Initial BMP showed bicarb 4, creatinine 1.33, phos 5.8, ALT 92, AST 52, glucose 517, ketones > 9, lactic acid 5.6, A1C 10.5  -Procalcitonin 0.21, troponin T high sensitivity 18  -Received 3 Liters normal saline in the ER. He was given D5W with bicarb 150 meq  at 150 ml/hr.   -Will replace electrolytes as needed  -Will order Lantus insulin 20 units bid. Will also order Novolog 1 unit per 10 gram CHO tid, medium resistance insulin sliding scale  -Will monitor blood sugar and adjust insulin dose as needed     Hypokalemia  Hypophosphatemia  Suspect multifactorial cause including DKA, alcohol use  Will replace electrolytes as needed     Hypertensive urgency  -His blood pressure was in 200s on arrival to emergency room. BP improving   -Continue prn hydralazine 10 mg IV q4 hrs prn for SBP >180  -Started on amlodipine   -Echocardiogram showed ejection fraction is 60-65%.The right ventricle is normal in structure, function and size.Borderline aortic root dilatation.The ascending aorta is Mildly dilated.  -Will switch to  ACE-I or ARB given new diagnosis of diabetes  -Needs aggressive blood pressure control     History of asthma  -Completed 5 day course of prednisone. Breathing stable today  -Will have prn Xopenex neb available       Lactic Acidosis.  SIRS likely due to DKA  -No clear evidence of infection   -has leucocytosis but it could be due to prednisone and also DKA  -Urinalysis negative. No evidence of pneumonia. Procalcitonin unremarkable  -No antibiotic needed at this time      Bloody stool  -Discussed with RN. Patient noted to have black stool  -CT of the chest on admission showed esophageal wall thickening  -Hgb 12.5 today   -Will continue Protonix 40 mg po bid  -Already had his breakfast this am. Will keep NPO until evaluation by GI  -Consulted gastroenetrology for further evaluation and recommendations     DVT Prophylaxis: Enoxaparin subcutaneous    Code Status: Full Code    Disposition: Expected discharge : Anticipate more than 2 nights of hospital course until DKA resolves. Will transfer to [facility]    Kari Fontanez MD    Interval History   Patient seen and examined today. He stated that he is feeling better. He has no nausea or vomiting. He was noted to have bloody  stool today.     -Data reviewed today: I reviewed all new labs and imaging results over the last 24 hours. I personally reviewed     Physical Exam   Temp: 98.7  F (37.1  C) Temp src: Temporal BP: 131/76 Pulse: 108   Resp: 16 SpO2: 99 % O2 Device: None (Room air)    Vitals:    08/25/23 0600   Weight: 124.8 kg (275 lb 2.2 oz)     Vital Signs with Ranges  Temp:  [98.1  F (36.7  C)-98.7  F (37.1  C)] 98.7  F (37.1  C)  Pulse:  [105-128] 108  Resp:  [14-27] 16  BP: (108-144)/(64-90) 131/76  SpO2:  [90 %-99 %] 99 %  I/O last 3 completed shifts:  In: 1250 [I.V.:1250]  Out: 1950 [Urine:1950]    GEN:  Alert, oriented x 3, appears comfortable, NAD.  HEENT:  Normocephalic/atraumatic, no scleral icterus, no nasal discharge, mouth moist.  CV:  Regular rate and rhythm, no murmur or JVD.  S1 + S2 noted, no S3 or S4.  LUNGS:  Clear to auscultation bilaterally without rales/rhonchi/wheezing/retractions.  Symmetric chest rise on inhalation noted.  ABD:  Active bowel sounds, soft, non-tender/non-distended.  No rebound/guarding/rigidity.  EXT:  No edema or cyanosis.  Hands/feet warm to touch with good signs of peripheral perfusion.  No joint synovitis noted.  SKIN:  Dry to touch, no exanthems noted in the visualized areas.  NEURO:  Symmetric muscle strength, sensation to touch grossly intact.  No new focal deficits appreciated.    Medications        acetaminophen  650 mg Oral Once    amLODIPine  10 mg Oral Daily    [Held by provider] enoxaparin ANTICOAGULANT  40 mg Subcutaneous Q24H    fluticasone  1 puff Inhalation Daily    insulin aspart   Subcutaneous TID AC    insulin aspart  1-7 Units Subcutaneous TID AC    insulin aspart  1-5 Units Subcutaneous At Bedtime    insulin glargine  20 Units Subcutaneous BID    pantoprazole  40 mg Intravenous BID    sodium chloride (PF)  3 mL Intracatheter Q8H       Data   Recent Labs   Lab 08/27/23  0840 08/27/23  0735 08/27/23  0533 08/27/23  0201 08/27/23  0200 08/26/23  5172 08/26/23  2202  08/26/23  2130 08/26/23  1708 08/26/23  1447 08/26/23  0600 08/26/23  0527 08/25/23  0655 08/25/23  0610 08/25/23  0214 08/25/23  0204   WBC  --   --  5.0  --   --   --   --   --   --   --   --  10.3  --  20.7*  --  19.3*   HGB  --   --  12.5*  12.5*  --   --  13.7  --  12.9*  --   --   --  14.2  --  16.3   < > 17.5   MCV  --   --  101*  --   --   --   --   --   --   --   --  100  --  106*  --  108*   PLT  --   --  150  --   --   --   --   --   --   --   --  201  --  397  --  486*   NA  --   --  146*  --   --   --   --   --   --  144  --  146*   < > 140   < > 138   POTASSIUM 3.1*  --  3.5 3.2*  --   --   --   --    < > 3.2*  3.2*  --  2.9*   < > 3.7   < > 4.3   CHLORIDE  --   --  112*  --   --   --   --   --   --  113*  --  114*   < > 104   < > 96*   CO2  --   --  21*  --   --   --   --   --   --  17*  --  18*   < > 4*  --  4*   BUN  --   --  14.9  --   --   --   --   --   --  18.8  --  22.0   < > 24.6*   < > 25.2*   CR  --   --  0.86  --   --   --   --   --   --  0.87  --  1.13   < > 1.02   < > 1.33*   ANIONGAP  --   --  13  --   --   --   --   --   --  14  --  14   < > 32*  --  38*   MARGE  --   --  8.9  --   --   --   --   --   --  9.1  --  9.3   < > 8.5*  --  10.3*   GLC  --  186* 216*  --  190*  --    < >  --    < > 195*   < > 138*   < > 333*   < > 517*   ALBUMIN  --   --   --   --   --   --   --   --   --   --   --   --   --  3.9  --  4.6   PROTTOTAL  --   --   --   --   --   --   --   --   --   --   --   --   --  7.6  --  9.0*   BILITOTAL  --   --   --   --   --   --   --   --   --   --   --   --   --  0.7  --  0.8   ALKPHOS  --   --   --   --   --   --   --   --   --   --   --   --   --  139*  --  175*   ALT  --   --   --   --   --   --   --   --   --   --   --   --   --  78*  --  92*   AST  --   --   --   --   --   --   --   --   --   --   --   --   --  45  --  52*   LIPASE  --   --   --   --   --   --   --   --   --   --   --   --   --   --   --  28    < > = values in this interval not displayed.          No results found for this or any previous visit (from the past 24 hour(s)).

## 2023-08-27 NOTE — PROGRESS NOTES
Paged re: black stools this afternoon (2 reported by nursing).  Day Nurse reported to night nurse on handoff but this was not relayed to the on-call doctor during the day.    Patient admitted with DKA and new diagnosis of diabetes mellitus.  We will stop his Lovenox.  Start pantoprazole twice daily.  Check hemoglobin every 6 hours.  Check an occult blood.     Shady Gomes MD

## 2023-08-27 NOTE — PLAN OF CARE
ICU End of Shift Summary.  For vital signs and complete assessments, please see documentation flowsheets.      Pertinent assessments: AO, disoriented to time. Denies pain. Afebrile. VSS. Lungs clear. RA. Tolerating diet. Incont stool-black tarry stool x1.   Major Shift Events: K and phos replaced. Incont black stool  Plan (Upcoming Events): Follow labs  Discharge/Transfer Needs: Continue current plan of care     Bedside Shift Report Completed : Y  Bedside Safety Check Completed: Y

## 2023-08-27 NOTE — PROGRESS NOTES
Patient came in from ICU around noon per wheelchair with staff nurse. Patient was awake, alert and denied of any pain. Patient hooked to tele monitor with noted ST. Potassium levels checked and replaced per protocol. BG checked pre meals and due insulin given.

## 2023-08-27 NOTE — CONSULTS
GASTROENTEROLOGY CONSULTATION      Kalen Callejas  [address] [id number]  63 year old male     Admission Date/Time: 8/25/2023  Primary Care Provider: Lara, Son Layton     We were asked to see the patient in consultation by Dr. Fontanez for evaluation of melena and esophagitis.    ASSESSMENT:      #1 DKA  #2 SOB  #3 GERD  #4 Nausea/vomiting 2/2 #1  #5 Melena, stable Hb/vitals, likely 2/2 #3 and #4    Reviewed labs. Hb stable. Reviewed CT scan. Lower esophagus thickening likely esophagitis; with recent DKA, N/V- Alondra Isaac tear also possible and other sources such as PUD also possible.     Suggest stabilization of DKA and conservative IV protonix for above, then EGD prior to discharge for evaluation of above possibilities.     Discussed about interval EGD with pt- he does not wish to pursue this now but will think about it.     RECOMMENDATIONS:    IV protonix q12.  Monitoring Hb q12 ok.  Management of DKA per ICU team.  Ok to start clears.   Aggressive anti-nausea regimen.  Avoid anticoagulation.    GI team will follow.    Janina Van MD   [facility]  [phone]      ________________________________________________________________________        HPI:  Kalen Callejas is a 63 year old male who was admitted with progressive SOB. Evaluations showed DKA, first manifestation. No prior DM but reports ongoing polyuria/polydipsia, nausea/vomiting (worse lately) and heartburn. Reports taking OTC meds for heartburn but no prior eval. No abd pain. He had 2 black stools yesterday in the setting of enoxaparin use. Hb stable. Currently in ICU for DKA management.     ROS: A comprehensive ten point review of systems was negative aside from those in mentioned in the HPI.      PAST MEDICAL HISTORY:  No past medical history on file.  SOCIAL HISTORY:  Social History     Tobacco Use    Smoking status: Never    Smokeless tobacco: Never     FAMILY HISTORY:  No family history on  "file.  ALLERGIES:   Allergies   Allergen Reactions    Bee Venom      MEDICATIONS:   Prior to Admission medications    Medication Sig Start Date End Date Taking? Authorizing Provider   acetaminophen (TYLENOL) 325 MG tablet Take 2 tablets (650 mg) by mouth every 6 hours as needed for other (For optimal non-opioid multimodal pain management to improve pain control.) 6/17/22  Yes Ricky Randhawa APRN CNP   albuterol (PROAIR HFA/PROVENTIL HFA/VENTOLIN HFA) 108 (90 Base) MCG/ACT inhaler Inhale 2 puffs into the lungs every 4 hours as needed 5/17/22  Yes Reported, Patient   budesonide (PULMICORT FLEXHALER) 180 MCG/ACT inhaler Inhale 2 puffs into the lungs 2 times daily 5/17/22  Yes Reported, Patient   fluticasone (FLONASE) 50 MCG/ACT nasal spray Spray 2 sprays into both nostrils daily   Yes Unknown, Entered By History   meloxicam (MOBIC) 7.5 MG tablet Take 1 tablet (7.5 mg) by mouth 2 times daily 10/10/22  Yes Sulaiman Akins MD   methocarbamol (ROBAXIN) 500 MG tablet Take 1 tablet (500 mg) by mouth 3 times daily 10/18/22  Yes Fabi Marquez PA-C   methocarbamol (ROBAXIN) 750 MG tablet Take 1 tablet (750 mg) by mouth every 6 hours as needed for muscle spasms 8/3/22  Yes Dada Acosta PA-C     PHYSICAL EXAM:   /76 (BP Location: Left arm)   Pulse 108   Temp 98.7  F (37.1  C) (Temporal)   Resp 16   Ht 1.88 m (6' 2\")   Wt 124.8 kg (275 lb 2.2 oz)   SpO2 97%   BMI 35.33 kg/m     GEN: No distress, Alert, comfortable.  ELVIRA: No pallor, No icterus, oral mucosa moist.    NECK: No thyromegaly. No mass.    HEART: Tachycardic  LUNGS: Clear on ant auscultation.  ABD: soft, obese, no peritoneal signs.  NEURO: No gross motor deficits.  PSYCH: A O X 3.  EXTREMITIES: No pedal edema.      ADDITIONAL DATA:   I reviewed the patient's new clinical lab test results.   Recent Labs   Lab Test 08/27/23  0533 08/26/23  2475 08/26/23  2130 08/26/23  0527 08/25/23  0610 06/14/22  0543 06/13/22  1022 06/10/22  0524 " 06/09/22 2128   WBC 5.0  --   --  10.3 20.7*   < >  --    < > 7.8   HGB 12.5*  12.5* 13.7 12.9* 14.2 16.3   < >  --    < > 16.1   *  --   --  100 106*   < >  --    < > 102*     --   --  201 397   < >  --    < > 198   INR  --   --   --   --   --   --  1.13  --  1.07    < > = values in this interval not displayed.     Recent Labs   Lab Test 08/27/23  0840 08/27/23  0735 08/27/23  0533 08/27/23  0201 08/27/23  0200 08/26/23  1708 08/26/23  1447 08/26/23  0600 08/26/23  0527   NA  --   --  146*  --   --   --  144  --  146*   POTASSIUM 3.1*  --  3.5 3.2*  --    < > 3.2*  3.2*  --  2.9*   CHLORIDE  --   --  112*  --   --   --  113*  --  114*   CO2  --   --  21*  --   --   --  17*  --  18*   BUN  --   --  14.9  --   --   --  18.8  --  22.0   CR  --   --  0.86  --   --   --  0.87  --  1.13   ANIONGAP  --   --  13  --   --   --  14  --  14   MARGE  --   --  8.9  --   --   --  9.1  --  9.3   GLC  --  186* 216*  --  190*   < > 195*   < > 138*    < > = values in this interval not displayed.     Recent Labs   Lab Test 08/25/23  0610 08/25/23  0343 08/25/23  0204   ALBUMIN 3.9  --  4.6   BILITOTAL 0.7  --  0.8   ALT 78*  --  92*   AST 45  --  52*   PROTEIN  --  100*  --    LIPASE  --   --  28        I reviewed the patient's new imaging results.    Total time: 50 minutes.

## 2023-08-28 LAB
ANION GAP SERPL CALCULATED.3IONS-SCNC: 13 MMOL/L (ref 7–15)
BUN SERPL-MCNC: 12 MG/DL (ref 8–23)
CALCIUM SERPL-MCNC: 9.3 MG/DL (ref 8.8–10.2)
CHLORIDE SERPL-SCNC: 104 MMOL/L (ref 98–107)
CREAT SERPL-MCNC: 0.78 MG/DL (ref 0.67–1.17)
DEPRECATED HCO3 PLAS-SCNC: 21 MMOL/L (ref 22–29)
ERYTHROCYTE [DISTWIDTH] IN BLOOD BY AUTOMATED COUNT: 12.3 % (ref 10–15)
GAD65 AB SER IA-ACNC: <5 IU/ML
GFR SERPL CREATININE-BSD FRML MDRD: >90 ML/MIN/1.73M2
GLUCOSE BLDC GLUCOMTR-MCNC: 155 MG/DL (ref 70–99)
GLUCOSE BLDC GLUCOMTR-MCNC: 157 MG/DL (ref 70–99)
GLUCOSE BLDC GLUCOMTR-MCNC: 163 MG/DL (ref 70–99)
GLUCOSE BLDC GLUCOMTR-MCNC: 195 MG/DL (ref 70–99)
GLUCOSE BLDC GLUCOMTR-MCNC: 218 MG/DL (ref 70–99)
GLUCOSE SERPL-MCNC: 186 MG/DL (ref 70–99)
HCT VFR BLD AUTO: 41 % (ref 40–53)
HGB BLD-MCNC: 12.4 G/DL (ref 13.3–17.7)
HGB BLD-MCNC: 14.2 G/DL (ref 13.3–17.7)
HGB BLD-MCNC: 14.2 G/DL (ref 13.3–17.7)
MAGNESIUM SERPL-MCNC: 1.9 MG/DL (ref 1.7–2.3)
MCH RBC QN AUTO: 34.9 PG (ref 26.5–33)
MCHC RBC AUTO-ENTMCNC: 34.6 G/DL (ref 31.5–36.5)
MCV RBC AUTO: 101 FL (ref 78–100)
PHOSPHATE SERPL-MCNC: 2.4 MG/DL (ref 2.5–4.5)
PLATELET # BLD AUTO: 223 10E3/UL (ref 150–450)
POTASSIUM SERPL-SCNC: 3.4 MMOL/L (ref 3.4–5.3)
POTASSIUM SERPL-SCNC: 3.6 MMOL/L (ref 3.4–5.3)
RBC # BLD AUTO: 4.07 10E6/UL (ref 4.4–5.9)
SODIUM SERPL-SCNC: 138 MMOL/L (ref 136–145)
WBC # BLD AUTO: 8.8 10E3/UL (ref 4–11)

## 2023-08-28 PROCEDURE — 250N000009 HC RX 250: Performed by: INTERNAL MEDICINE

## 2023-08-28 PROCEDURE — 999N000157 HC STATISTIC RCP TIME EA 10 MIN

## 2023-08-28 PROCEDURE — 85027 COMPLETE CBC AUTOMATED: CPT | Performed by: INTERNAL MEDICINE

## 2023-08-28 PROCEDURE — C9113 INJ PANTOPRAZOLE SODIUM, VIA: HCPCS | Mod: JZ | Performed by: HOSPITALIST

## 2023-08-28 PROCEDURE — 80048 BASIC METABOLIC PNL TOTAL CA: CPT | Performed by: INTERNAL MEDICINE

## 2023-08-28 PROCEDURE — 83735 ASSAY OF MAGNESIUM: CPT | Performed by: INTERNAL MEDICINE

## 2023-08-28 PROCEDURE — 94640 AIRWAY INHALATION TREATMENT: CPT

## 2023-08-28 PROCEDURE — 250N000013 HC RX MED GY IP 250 OP 250 PS 637: Performed by: INTERNAL MEDICINE

## 2023-08-28 PROCEDURE — 84100 ASSAY OF PHOSPHORUS: CPT | Performed by: INTERNAL MEDICINE

## 2023-08-28 PROCEDURE — 85018 HEMOGLOBIN: CPT | Performed by: INTERNAL MEDICINE

## 2023-08-28 PROCEDURE — 36415 COLL VENOUS BLD VENIPUNCTURE: CPT | Performed by: INTERNAL MEDICINE

## 2023-08-28 PROCEDURE — 120N000001 HC R&B MED SURG/OB

## 2023-08-28 PROCEDURE — 99233 SBSQ HOSP IP/OBS HIGH 50: CPT | Performed by: INTERNAL MEDICINE

## 2023-08-28 PROCEDURE — 250N000011 HC RX IP 250 OP 636: Mod: JZ | Performed by: HOSPITALIST

## 2023-08-28 PROCEDURE — 84132 ASSAY OF SERUM POTASSIUM: CPT | Performed by: INTERNAL MEDICINE

## 2023-08-28 RX ORDER — LISINOPRIL 20 MG/1
20 TABLET ORAL DAILY
Status: DISCONTINUED | OUTPATIENT
Start: 2023-08-29 | End: 2023-08-28

## 2023-08-28 RX ORDER — LOSARTAN POTASSIUM 25 MG/1
25 TABLET ORAL DAILY
Status: DISCONTINUED | OUTPATIENT
Start: 2023-08-28 | End: 2023-08-29 | Stop reason: HOSPADM

## 2023-08-28 RX ORDER — POTASSIUM CHLORIDE 1500 MG/1
40 TABLET, EXTENDED RELEASE ORAL ONCE
Status: COMPLETED | OUTPATIENT
Start: 2023-08-28 | End: 2023-08-28

## 2023-08-28 RX ORDER — BLOOD PRESSURE TEST KIT
KIT MISCELLANEOUS
Qty: 100 EACH | Refills: 0 | Status: SHIPPED | OUTPATIENT
Start: 2023-08-28 | End: 2023-09-13

## 2023-08-28 RX ADMIN — FLUTICASONE FUROATE 1 PUFF: 100 POWDER RESPIRATORY (INHALATION) at 07:14

## 2023-08-28 RX ADMIN — AMLODIPINE BESYLATE 10 MG: 10 TABLET ORAL at 08:55

## 2023-08-28 RX ADMIN — POTASSIUM & SODIUM PHOSPHATES POWDER PACK 280-160-250 MG 1 PACKET: 280-160-250 PACK at 16:49

## 2023-08-28 RX ADMIN — INSULIN ASPART 3 UNITS: 100 INJECTION, SOLUTION INTRAVENOUS; SUBCUTANEOUS at 13:28

## 2023-08-28 RX ADMIN — INSULIN GLARGINE 20 UNITS: 100 INJECTION, SOLUTION SUBCUTANEOUS at 21:49

## 2023-08-28 RX ADMIN — POTASSIUM & SODIUM PHOSPHATES POWDER PACK 280-160-250 MG 1 PACKET: 280-160-250 PACK at 08:55

## 2023-08-28 RX ADMIN — INSULIN ASPART 1 UNITS: 100 INJECTION, SOLUTION INTRAVENOUS; SUBCUTANEOUS at 08:58

## 2023-08-28 RX ADMIN — PANTOPRAZOLE SODIUM 40 MG: 40 INJECTION, POWDER, FOR SOLUTION INTRAVENOUS at 08:56

## 2023-08-28 RX ADMIN — LOSARTAN POTASSIUM 25 MG: 25 TABLET, FILM COATED ORAL at 12:35

## 2023-08-28 RX ADMIN — LEVALBUTEROL HYDROCHLORIDE 0.63 MG: 0.63 SOLUTION RESPIRATORY (INHALATION) at 11:06

## 2023-08-28 RX ADMIN — POTASSIUM CHLORIDE 40 MEQ: 1500 TABLET, EXTENDED RELEASE ORAL at 08:55

## 2023-08-28 RX ADMIN — POTASSIUM & SODIUM PHOSPHATES POWDER PACK 280-160-250 MG 1 PACKET: 280-160-250 PACK at 11:06

## 2023-08-28 RX ADMIN — INSULIN GLARGINE 20 UNITS: 100 INJECTION, SOLUTION SUBCUTANEOUS at 08:55

## 2023-08-28 RX ADMIN — PANTOPRAZOLE SODIUM 40 MG: 40 INJECTION, POWDER, FOR SOLUTION INTRAVENOUS at 21:48

## 2023-08-28 RX ADMIN — INSULIN ASPART 5 UNITS: 100 INJECTION, SOLUTION INTRAVENOUS; SUBCUTANEOUS at 19:07

## 2023-08-28 ASSESSMENT — ACTIVITIES OF DAILY LIVING (ADL)
ADLS_ACUITY_SCORE: 22
ADLS_ACUITY_SCORE: 26
ADLS_ACUITY_SCORE: 22
ADLS_ACUITY_SCORE: 26
ADLS_ACUITY_SCORE: 22
ADLS_ACUITY_SCORE: 26
ADLS_ACUITY_SCORE: 22
ADLS_ACUITY_SCORE: 26
ADLS_ACUITY_SCORE: 22
ADLS_ACUITY_SCORE: 26

## 2023-08-28 NOTE — PLAN OF CARE
Goal Outcome Evaluation:       VS stable. VO. Diminished LS. No complaints of pain. Blood sugar level of 155. Slept well throughout the night.

## 2023-08-28 NOTE — PROGRESS NOTES
"GASTROENTEROLOGY PROGRESS NOTE     SUBJECTIVE:  Blood sugars improved. Tolerating regular diet without nausea, vomiting, or abdominal pain. Had a non-bloody BM this morning. Hemoglobin up to 14.2.     OBJECTIVE:  General Appearance:    /88 (BP Location: Left arm)   Pulse 115   Temp 98  F (36.7  C) (Oral)   Resp 18   Ht 1.88 m (6' 2\")   Wt 124.8 kg (275 lb 2.2 oz)   SpO2 99%   BMI 35.33 kg/m    Temp (24hrs), Av  F (36.7  C), Min:98  F (36.7  C), Max:98.1  F (36.7  C)    No data found.    Intake/Output Summary (Last 24 hours) at 2023 1339  Last data filed at 2023 2100  Gross per 24 hour   Intake 400 ml   Output --   Net 400 ml        PHYSICAL EXAM     GEN: NAD  GASTROINTESTINAL: non-distended, soft, non-tender  NEURO: alert and oriented, appears comfortable           Additional Comments:  ROS, FH, SH: See initial GI consult for details.     I have reviewed the patient's new clinical lab results:     Recent Labs   Lab Test 23  0641 23  1728 23  0533 23  2130 23  0527 22  0543 22  1022 06/10/22  0524 22  2128   WBC 8.8  --  5.0  --  10.3   < >  --    < > 7.8   HGB 14.2  14.2 13.2* 12.5*  12.5*   < > 14.2   < >  --    < > 16.1   *  --  101*  --  100   < >  --    < > 102*     --  150  --  201   < >  --    < > 198   INR  --   --   --   --   --   --  1.13  --  1.07    < > = values in this interval not displayed.     Recent Labs   Lab Test 23  1246 23  0641 23  20423  0840 23  0533 235 23  1447   POTASSIUM 3.6 3.4 3.9   < > 3.5   < > 3.2*  3.2*   CHLORIDE  --  104  --   --  112*  --  113*   CO2  --  21*  --   --  21*  --  17*   BUN  --  12.0  --   --  14.9  --  18.8   ANIONGAP  --  13  --   --  13  --  14    < > = values in this interval not displayed.     Recent Labs   Lab Test 23  0610 23  0343 23  0204   ALBUMIN 3.9  --  4.6   BILITOTAL 0.7  --  0.8   ALT 78*  --  " 92*   AST 45  --  52*   PROTEIN  --  100*  --    LIPASE  --   --  28           CONSULTANT ASSESSMENT/PLAN  1.  Abnormal imaging  2.  GERD  3.  Nausea/vomiting s/t DKA    This patient is a 63-year-old male with history of GERD, obesity, asthma who was admitted for management of DKA.  Gastroenterology consulted as CT C/A/P showed diffuse esophageal wall thickening and concern of melena.  He has not had any further episodes of melena and hemoglobin is stable at 14.2.  Patient deferred an inpatient EGD, but is agreeable to an outpatient EGD to evaluate causes of abnormal imaging.  Likely due to esophagitis, though Alondra-Garcia tear or peptic ulcer disease is possible.  Would maintain on twice daily PPI until his procedure.    PLAN  --Outpatient EGD, [facility] will call patient to arrange.   --BID PPI, okay to transition to oral.   --Monitor for overt bleeding, hgb while inpatient.   --Antireflux diet, alcohol avoidance. Discussed with patient.     Will discuss with Dr. Silva, GI staff. [facility] will sign of of the patient's care at this time. Please do not hesitate to reach out should any questions or concern arise.     Time spent: 35 minutes, greater than 50% of the visit was spent in counseling/coordination of care.     Yuly Torres, [facility] ([facility])  [phone]

## 2023-08-28 NOTE — PLAN OF CARE
Goal Outcome Evaluation:      Plan of Care Reviewed With: patient      Shift: 7am-7pm    Vitals: Temp: 98.1  F (36.7  C) Temp src: Oral BP: 127/77 Pulse: 111   Resp: 18 SpO2: 97 % O2 Device: None (Room air)      Orientation: Alert and Orientated   Pain: Denies   Activity: SBA, walking in hallway   Resp: Clear, requested neb - felt relief after   Diet: Mod. Carb diet, carb count, eating well  How to take meds: Whole w/ fluids  GI: BM this shift, Active bowel sounds   : Continent   Protocol: Mag, Phos and Potassium replacement. Potassium and Phosphorus replaced  BG: ACHS. 163 & 195 & 157  Skin: Redness in kaitlynn area   Lines: 2 IV, 1 in left, 1 in right both saline locked   Other: Discharge meds located in med room by anahi   Plan: Potential Discharge tomorrow

## 2023-08-28 NOTE — PROGRESS NOTES
[facility]    Medicine Progress Note - Hospitalist Service    Date of Admission:  8/25/2023    Assessment & Plan   Kalen Callejas is a 63 year old male admitted on 8/25/2023. He has a history of obesity, asthma was brought in due to shortness of breath which has been going on for couple of days.  He was so short of breath that he could barely walk. His symptoms started a week ago. He denies any fevers or chills.  Initially he thought his symptoms were due to asthma.  He has had nausea and emesis which has been clear.  He denies any abdominal pain.  He denies any chest pain or dizziness or focal weakness.  He denies any blurry vision.  He has been having polyuria and polydipsia for more than a week.  He has no prior history of diabetes.   He stated that he had a backup prescription of prednisone for his asthma which was prescribed for him about a year ago. He stated that he took prednisone 20 mg for 5 days.    In the ER over here when he presented he was markedly tachypneic and tachycardic.  He was noted to have marked anion gap metabolic acidosis with a pH of 6.84, lactic acid of 6.1 and ketones greater than 9.  He was given 3 L of crystalloids, IV Zosyn, bicarbonate. A CT of the chest abdomen pelvis showed diffuse esophageal wall thickening which can be seen in diffuse esophagitis and a 1 cm left adrenal nodule similar to June 2022 which could represent adrenal adenoma. He has been initiated on an insulin drip.   He was admitted to ICU for further management.      Diabetic ketoacidosis-resolved  New diagnosis of diabetes mellitus-started on insulin  -No prior diagnosis of diabetes mellitus. Hgb A1c 10.5 on admission  -Initial BMP showed bicarb 4, creatinine 1.33, phos 5.8, ALT 92, AST 52, glucose 517, ketones > 9, lactic acid 5.6, A1C 10.5  -Procalcitonin 0.21, troponin T high sensitivity 18  -Received 3 Liters normal saline in the ER. He was given D5W with bicarb 150 meq at 150 ml/hr.    -Will replace electrolytes as needed  -Will order Lantus insulin 20 units bid. Will also order Novolog 1 unit per 10 gram CHO tid, medium resistance insulin sliding scale  -Will monitor blood sugar and adjust insulin dose as needed   -Patient is amenable for insulin therapy upon discharge.  He mentioned that his wife can assist with insulin administration as well  -Please continue diabetic education and insulin administration education instructions  -Currently blood sugar levels continues to show significant improvement  -Will need diabetic supplies upon discharge    Hypokalemia  Earlier with hyperkalemia  Hypophosphatemia  Suspect multifactorial cause including DKA, alcohol use  Will replace electrolytes as needed     Hypertensive urgency  -His blood pressure was in 200s on arrival to emergency room. BP improving   -Continue prn hydralazine 10 mg IV q4 hrs prn for SBP >180  -Started on amlodipine   -Echocardiogram showed ejection fraction is 60-65%.The right ventricle is normal in structure, function and size.Borderline aortic root dilatation.The ascending aorta is Mildly dilated.  -Will switch to  ACE-I or ARB given new diagnosis of diabetes  -Needs aggressive blood pressure control     History of asthma  -Completed 5 day course of prednisone. Breathing stable.  Not hypoxic.  No wheezing  -Will have prn Xopenex neb available       Lactic Acidosis.  SIRS likely due to DKA  -No clear evidence of infection   -has leucocytosis but it could be due to prednisone and also DKA  -Urinalysis negative. No evidence of pneumonia. Procalcitonin unremarkable  -No antibiotic needed at this time      Bloody stool  -Discussed with RN. Patient noted to have black stool  -CT of the chest on admission showed esophageal wall thickening  -Hgb remained stable at 14 g  -Will continue Protonix 40 mg po bid  -Appreciate prompt input from GI.  I believe EGD was offered but patient deferring at that time    DVT  "Prophylaxis  Mechanical    Code Status: Full Code    Disposition: Does not need any further IMC monitoring.  Anticipating possible discharge in the next 24 hours           Diet: Moderate Consistent Carb (60 g CHO per Meal) Diet    DVT Prophylaxis: Pneumatic Compression Devices and Ambulate every shift  Schmitt Catheter: Not present  Lines: None     Cardiac Monitoring: None  Code Status: Full Code      Clinically Significant Risk Factors        # Hypokalemia: Lowest K = 3.1 mmol/L in last 2 days, will replace as needed  # Hypernatremia: Highest Na = 146 mmol/L in last 2 days, will monitor as appropriate                # DMII: A1C = 10.5 % (Ref range: <5.7 %) within past 6 months, PRESENT ON ADMISSION  # Obesity: Estimated body mass index is 35.33 kg/m  as calculated from the following:    Height as of this encounter: 1.88 m (6' 2\").    Weight as of this encounter: 124.8 kg (275 lb 2.2 oz)., PRESENT ON ADMISSION            Disposition Plan     Expected Discharge Date: Anticipating in the next 24 hours  Destination: home            Greg Pollard MD, MD  Hospitalist Service  [facility]  Securely message with [facility]. (more info)  Text page via [employer] Paging/Directory   ______________________________________________________________________    Interval History    I assume medicine service care today.  Seen and examined.  Chart reviewed.  Case discussed with nursing service.  I met this a gentleman while he is laying comfortably in bed.  He mentioned that he is doing well.  Tolerating oral diet.  No reported abdominal pain no nausea or vomiting.  No reported bleeding tendencies as well.  Not requiring any oxygen support.  No mental status changes reported overnight.  Voiding freely.  Passing flatus.  Awaiting for BM.      Physical Exam   Vital Signs: Temp: 98  F (36.7  C) Temp src: Oral BP: 139/88 Pulse: 115   Resp: 18 SpO2: 99 % O2 Device: None (Room air)    Weight: 275 lbs 2.15 oz    HEENT; " Atraumatic, normocephalic, pinkish conjuctiva, pupils bilateral reactive   Skin: warm and moist, no rashes  Lymphatics: no cervical or axillary lymphandenopathy  Lungs: equal chest expansion, clear to auscultation, no wheezes, no stridor, no crackles,   Heart: normal rate, normal rhythm, no rubs or gallops.   Abdomen: normal bowel sounds, no tenderness, no peritoneal signs, no guarding  Extremities: no deformities, bilateral lower extremity nonpitting edema  Neuro; follow commands, alert and oriented x3, spontaneous speech, coherent, moves all extremities spontaneously  Psych; no hallucination, euthymic mood, not agitated      Medical Decision Making       50 MINUTES SPENT BY ME on the date of service doing chart review, history, exam, documentation & further activities per the note.  MANAGEMENT DISCUSSED with the following over the past 24 hours: Yes   NOTE(S)/MEDICAL RECORDS REVIEWED over the past 24 hours: Yes       Data     I have personally reviewed the following data over the past 24 hrs:    8.8  \   14.2; 14.2   / 223     138 104 12.0 /  163 (H)   3.4 21 (L) 0.78 \       Imaging results reviewed over the past 24 hrs:   No results found for this or any previous visit (from the past 24 hour(s)).

## 2023-08-28 NOTE — CONSULTS
Patient has [employer] through an employer.    CGM  Dexcom:  Requires prior auth; please reconsult if you'd like me to pursue.  After approval, estimated cost would be $212/mo for sensors (transmitters and reader additional).  Freestyle Justin:  Requires prior auth; please reconsult if you'd like me to pursue.  After approval, estimated cost would be $35/mo.    Covered glucometers (per 100 strips)   Accu-Chek Josie: $14   Accu-Chek Guide: $6   Contour Next: $90   Freestyle Lite: $40   One Touch Ultra: $26   One Touch Verio: $18       Rapid-acting insulin    Novolog Flexpens: $35 for 30 day supply, $65 for 60-90 day supply   Humalog Kwikpens: Not covered       Intermediate    Novolin N Flexpens: $35 for 30 day supply, $65 for 60-90 day supply   Humulin N Kwikpens: Not covered       Basal    Lantus Solostar: Not covered   Semglee-ygfn pens: $35 for 30 day supply, $65 for 60-90 day supply   Basaglar pens: $35 for 30 day supply, $65 for 60-90 day supply   Levemir pens: $35 for 30 day supply, $65 for 60-90 day supply   Tresiba pens: $355     Karena Berger  Pharmacy Technician/Liaison, Discharge Pharmacy   [phone] (voice or text)  [email or web address]

## 2023-08-29 VITALS
HEART RATE: 117 BPM | RESPIRATION RATE: 18 BRPM | SYSTOLIC BLOOD PRESSURE: 124 MMHG | HEIGHT: 74 IN | OXYGEN SATURATION: 98 % | TEMPERATURE: 98.3 F | DIASTOLIC BLOOD PRESSURE: 80 MMHG | WEIGHT: 275.13 LBS | BODY MASS INDEX: 35.31 KG/M2

## 2023-08-29 LAB
ENTEROCOCCUS FAECALIS: NOT DETECTED
ENTEROCOCCUS FAECIUM: NOT DETECTED
GLUCOSE BLDC GLUCOMTR-MCNC: 207 MG/DL (ref 70–99)
GLUCOSE BLDC GLUCOMTR-MCNC: 234 MG/DL (ref 70–99)
GLUCOSE BLDC GLUCOMTR-MCNC: 235 MG/DL (ref 70–99)
HGB BLD-MCNC: 13.6 G/DL (ref 13.3–17.7)
LISTERIA SPECIES (DETECTED/NOT DETECTED): NOT DETECTED
MAGNESIUM SERPL-MCNC: 1.7 MG/DL (ref 1.7–2.3)
PHOSPHATE SERPL-MCNC: 2.5 MG/DL (ref 2.5–4.5)
POTASSIUM SERPL-SCNC: 3.5 MMOL/L (ref 3.4–5.3)
SCANNED LAB RESULT: ABNORMAL
SCANNED LAB RESULT: NORMAL
STAPHYLOCOCCUS AUREUS: NOT DETECTED
STAPHYLOCOCCUS EPIDERMIDIS: NOT DETECTED
STAPHYLOCOCCUS LUGDUNENSIS: NOT DETECTED
STAPHYLOCOCCUS SPECIES: NOT DETECTED
STREPTOCOCCUS AGALACTIAE: NOT DETECTED
STREPTOCOCCUS ANGINOSUS GROUP: NOT DETECTED
STREPTOCOCCUS PNEUMONIAE: NOT DETECTED
STREPTOCOCCUS PYOGENES: NOT DETECTED
STREPTOCOCCUS SPECIES: NOT DETECTED

## 2023-08-29 PROCEDURE — 250N000011 HC RX IP 250 OP 636: Mod: JZ | Performed by: HOSPITALIST

## 2023-08-29 PROCEDURE — 99239 HOSP IP/OBS DSCHRG MGMT >30: CPT | Performed by: INTERNAL MEDICINE

## 2023-08-29 PROCEDURE — C9113 INJ PANTOPRAZOLE SODIUM, VIA: HCPCS | Mod: JZ | Performed by: HOSPITALIST

## 2023-08-29 PROCEDURE — 36415 COLL VENOUS BLD VENIPUNCTURE: CPT | Performed by: INTERNAL MEDICINE

## 2023-08-29 PROCEDURE — 999N000157 HC STATISTIC RCP TIME EA 10 MIN

## 2023-08-29 PROCEDURE — 250N000013 HC RX MED GY IP 250 OP 250 PS 637: Performed by: INTERNAL MEDICINE

## 2023-08-29 PROCEDURE — 84132 ASSAY OF SERUM POTASSIUM: CPT | Performed by: INTERNAL MEDICINE

## 2023-08-29 PROCEDURE — 85018 HEMOGLOBIN: CPT | Performed by: INTERNAL MEDICINE

## 2023-08-29 PROCEDURE — 83735 ASSAY OF MAGNESIUM: CPT | Performed by: INTERNAL MEDICINE

## 2023-08-29 PROCEDURE — 94640 AIRWAY INHALATION TREATMENT: CPT

## 2023-08-29 PROCEDURE — 84100 ASSAY OF PHOSPHORUS: CPT | Performed by: INTERNAL MEDICINE

## 2023-08-29 RX ORDER — LOSARTAN POTASSIUM 25 MG/1
25 TABLET ORAL DAILY
Qty: 30 TABLET | Refills: 0 | Status: SHIPPED | OUTPATIENT
Start: 2023-08-30 | End: 2023-09-21

## 2023-08-29 RX ORDER — PANTOPRAZOLE SODIUM 40 MG/1
40 TABLET, DELAYED RELEASE ORAL
Qty: 60 TABLET | Refills: 0 | Status: SHIPPED | OUTPATIENT
Start: 2023-08-29 | End: 2023-10-02

## 2023-08-29 RX ORDER — PANTOPRAZOLE SODIUM 40 MG/1
40 TABLET, DELAYED RELEASE ORAL
Status: DISCONTINUED | OUTPATIENT
Start: 2023-08-29 | End: 2023-08-29 | Stop reason: HOSPADM

## 2023-08-29 RX ADMIN — INSULIN ASPART 5 UNITS: 100 INJECTION, SOLUTION INTRAVENOUS; SUBCUTANEOUS at 08:11

## 2023-08-29 RX ADMIN — INSULIN ASPART 5 UNITS: 100 INJECTION, SOLUTION INTRAVENOUS; SUBCUTANEOUS at 13:09

## 2023-08-29 RX ADMIN — LOSARTAN POTASSIUM 25 MG: 25 TABLET, FILM COATED ORAL at 08:07

## 2023-08-29 RX ADMIN — FLUTICASONE FUROATE 1 PUFF: 100 POWDER RESPIRATORY (INHALATION) at 08:01

## 2023-08-29 RX ADMIN — INSULIN GLARGINE 20 UNITS: 100 INJECTION, SOLUTION SUBCUTANEOUS at 08:13

## 2023-08-29 RX ADMIN — PANTOPRAZOLE SODIUM 40 MG: 40 INJECTION, POWDER, FOR SOLUTION INTRAVENOUS at 08:06

## 2023-08-29 ASSESSMENT — ACTIVITIES OF DAILY LIVING (ADL)
ADLS_ACUITY_SCORE: 22

## 2023-08-29 NOTE — PLAN OF CARE
Called by lab regarding critical results of positive blood cultures. Noted to be 1/4 bottles from 8/25 that are growing diphtheroids. Likely to be contaminate and no further workup needed, however did attempt to call Vivek Britta at number listed in contact info ([phone]) to discuss with him. Unfortunately he did not answer and was unable to reach him. Recommend his PCP follow up with this further.

## 2023-08-29 NOTE — PROGRESS NOTES
Patient's After Visit Summary was reviewed with patient and/or significant other.   Patient verbalized understanding of After Visit Summary, recommended follow up and was given an opportunity to ask questions.   Discharge medications sent home with patient/family: YES   Discharged with spouse

## 2023-08-29 NOTE — DISCHARGE SUMMARY
"[facility]  Hospitalist Discharge Summary      Date of Admission:  8/25/2023  Date of Discharge:  8/29/2023  Discharging Provider: Greg Pollard MD, MD  Discharge Service: Hospitalist Service    Discharge Diagnoses   Diabetic ketoacidosis resolved  Severe uncontrolled newly diagnosed diabetes mellitus currently insulin requiring  Benign essential hypertension  Hypokalemia  Hyperkalemia  Hypophosphatemia  Alcohol dependence  High suspicion for alcohol misuse disorder  Lactic acidosis secondary to DKA  Melanotic stool with suspicion of esophagitis  Morbid obesity with a BMI of 35    Clinically Significant Risk Factors     # DMII: A1C = 10.5 % (Ref range: <5.7 %) within past 6 months  # Obesity: Estimated body mass index is 35.33 kg/m  as calculated from the following:    Height as of this encounter: 1.88 m (6' 2\").    Weight as of this encounter: 124.8 kg (275 lb 2.2 oz).       Follow-ups Needed After Discharge   Follow-up Appointments     Follow-up and recommended labs and tests       Follow up with primary care provider, Júnior Bermudez, within 7 days to   evaluate medication change, to evaluate treatment change, and for hospital   follow- up.    Follow up with [facility] as scheduled            Unresulted Labs Ordered in the Past 30 Days of this Admission       Date and Time Order Name Status Description    8/25/2023  9:22 AM Ethylene Glycol In process     8/25/2023  9:22 AM Volatiles Screen In process     8/25/2023  2:18 AM Blood Culture Peripheral Blood Preliminary     8/25/2023  2:18 AM Blood Culture Peripheral Blood Preliminary         These results will be followed up by primary care physician    Discharge Disposition   Discharged to home  Condition at discharge: Stable    Hospital Course   Continuing service care for this very pleasant 63-year-old gentleman who unfortunately has to presents in the hospital with severe critical illness needing intensive care unit hospitalization and " management given severe anion gap metabolic acidosis with concerns for electrolyte derangements consistent with his DKA and acidosis with initially hyperkalemia, dehydrated state.  Fortunately he did not lose any airway and was not comatose.  He bounced back and recovered after DKA management in the intensive care unit setting.  He is currently being managed on basal, prandial and sliding scale insulin regimen.  He is receptive and willing to administer insulin injections even upon discharge as he presented here with glucose toxicity and will be benefiting for insulin therapy at least in the next several weeks until seen in the PCP setting.  Started on antihypertensives with Cozaar.  He mentioned that he will follow-up closely as he does not regularly follow-up with any clinician as outpatient.  He assured us that he will monitor his glucose levels and will provide appropriate insulin therapy as prescribed.  There is also concerns regarding alcohol misuse as he drinks regularly 3-4 drinks of whiskey with soda on a regular basis.  He did not exhibit any alcohol withdrawal symptomatology here but he did say that he will try his very best to quit alcohol drinking by himself.  Concerns regarding possible GI bleeding events here with possible esophagitis in the setting of black tarry stooling earlier.  Fortunately he did not have any unstable hemodynamics and at the same time demonstrated normal hemoglobin levels.  He opted to follow-up as outpatient with GI service if needing further diagnostics such as EGD.    I will refer you to excerpts of prior progress notes as listed below for other details of his hospital stay:      Kalen Callejas is a 63 year old male admitted on 8/25/2023. He has a history of obesity, asthma was brought in due to shortness of breath which has been going on for couple of days.  He was so short of breath that he could barely walk. His symptoms started a week ago. He denies any fevers or chills.   Initially he thought his symptoms were due to asthma.  He has had nausea and emesis which has been clear.  He denies any abdominal pain.  He denies any chest pain or dizziness or focal weakness.  He denies any blurry vision.  He has been having polyuria and polydipsia for more than a week.  He has no prior history of diabetes.   He stated that he had a backup prescription of prednisone for his asthma which was prescribed for him about a year ago. He stated that he took prednisone 20 mg for 5 days.    In the ER over here when he presented he was markedly tachypneic and tachycardic.  He was noted to have marked anion gap metabolic acidosis with a pH of 6.84, lactic acid of 6.1 and ketones greater than 9.  He was given 3 L of crystalloids, IV Zosyn, bicarbonate. A CT of the chest abdomen pelvis showed diffuse esophageal wall thickening which can be seen in diffuse esophagitis and a 1 cm left adrenal nodule similar to June 2022 which could represent adrenal adenoma. He has been initiated on an insulin drip.   He was admitted to ICU for further management.      Diabetic ketoacidosis-resolved  New diagnosis of diabetes mellitus-started on insulin  -No prior diagnosis of diabetes mellitus. Hgb A1c 10.5 on admission  -Initial BMP showed bicarb 4, creatinine 1.33, phos 5.8, ALT 92, AST 52, glucose 517, ketones > 9, lactic acid 5.6, A1C 10.5  -Procalcitonin 0.21, troponin T high sensitivity 18  -Received 3 Liters normal saline in the ER. He was given D5W with bicarb 150 meq at 150 ml/hr.   -Will replace electrolytes as needed  -Will order Lantus insulin 20 units bid. Will also order Novolog 1 unit per 10 gram CHO tid, medium resistance insulin sliding scale  -Will monitor blood sugar and adjust insulin dose as needed   -Patient is amenable for insulin therapy upon discharge.  He mentioned that his wife can assist with insulin administration as well  -Please continue diabetic education and insulin administration education  instructions  -Currently blood sugar levels continues to show significant improvement  -Will need diabetic supplies upon discharge    Hypokalemia  Earlier with hyperkalemia  Hypophosphatemia  Suspect multifactorial cause including DKA, alcohol use  Will replace electrolytes as needed     Hypertensive urgency  -His blood pressure was in 200s on arrival to emergency room. BP improving   -Continue prn hydralazine 10 mg IV q4 hrs prn for SBP >180  -Started on amlodipine   -Echocardiogram showed ejection fraction is 60-65%.The right ventricle is normal in structure, function and size.Borderline aortic root dilatation.The ascending aorta is Mildly dilated.  -Will switch to  ACE-I or ARB given new diagnosis of diabetes  -Needs aggressive blood pressure control     History of asthma  -Completed 5 day course of prednisone. Breathing stable.  Not hypoxic.  No wheezing  -Will have prn Xopenex neb available       Lactic Acidosis.  SIRS likely due to DKA  -No clear evidence of infection   -has leucocytosis but it could be due to prednisone and also DKA  -Urinalysis negative. No evidence of pneumonia. Procalcitonin unremarkable  -No antibiotic needed at this time      Bloody stool  -Discussed with RN. Patient noted to have black stool  -CT of the chest on admission showed esophageal wall thickening  -Hgb remained stable at 14 g  -Will continue Protonix 40 mg po bid  -Appreciate prompt input from GI.  I believe EGD was offered but patient deferring at that time    DVT Prophylaxis  Mechanical    Code Status: Full Code    Disposition: Does not need any further IMC monitoring.  Anticipating possible discharge in the next 24 hours        Consultations This Hospital Stay   PHARMACY DISCHARGE EDUCATION BY PHARMACIST  GASTROENTEROLOGY IP CONSULT  PHARMACY LIAISON FOR MEDICATION COVERAGE CONSULT    Code Status   Full Code    Time Spent on this Encounter   IGreg MD, MD, personally saw the patient today and spent greater  than 30 minutes discharging this patient.       Greg Pollard MD, MD  [place] [address] [facility]  [address]  [facility] [address]  Phone: [phone]  Fax: [phone]  ______________________________________________________________________    Physical Exam   Vital Signs: Temp: 98.3  F (36.8  C) Temp src: Oral BP: 124/80 Pulse: 117   Resp: 18 SpO2: 98 % O2 Device: None (Room air)    Weight: 275 lbs 2.15 oz  HEENT; Atraumatic, normocephalic, pinkish conjuctiva, pupils bilateral reactive   Skin: warm and moist, no rashes  Lymphatics: no cervical or axillary lymphandenopathy  Lungs: equal chest expansion, clear to auscultation, no wheezes, no stridor, no crackles,   Heart: normal rate, normal rhythm, no rubs or gallops.   Abdomen: normal bowel sounds, no tenderness, no peritoneal signs, no guarding  Extremities: no deformities, bilateral nonpitting lower extremity edema  Neuro; follow commands, alert and oriented x3, spontaneous speech, coherent, moves all extremities spontaneously  Psych; no hallucination, euthymic mood, not agitated         Primary Care Physician   Son Layton Bermudez    Discharge Orders      Reason for your hospital stay    Admitted for severe DKA requiring ICU care found with hypertension, now with newly diagnosed insulin requiring uncontrolled DM.  You will continue with insulin therapy after hospital discharge and you will need to monitor and check you glucose levels at home. Please bring your recorded glucose levels upon follow up. Follow up with GI and PCP.     Follow-up and recommended labs and tests     Follow up with primary care provider, Júnior Bermudez, within 7 days to evaluate medication change, to evaluate treatment change, and for hospital follow- up.    Follow up with [facility] as scheduled     Activity    Your activity upon discharge: activity as tolerated     Full Code     Diet    Follow this diet upon discharge: Orders Placed This Encounter       Moderate Consistent Carb (60 g CHO per Meal) Diet       Significant Results and Procedures   Most Recent 3 CBC's:  Recent Labs   Lab Test 08/29/23  0654 08/28/23  1752 08/28/23  0641 08/27/23  1728 08/27/23  0533 08/26/23 2130 08/26/23  0527   WBC  --   --  8.8  --  5.0  --  10.3   HGB 13.6 12.4* 14.2  14.2   < > 12.5*  12.5*   < > 14.2   MCV  --   --  101*  --  101*  --  100   PLT  --   --  223  --  150  --  201    < > = values in this interval not displayed.     Most Recent 3 BMP's:  Recent Labs   Lab Test 08/29/23  0716 08/29/23  0654 08/29/23  0128 08/28/23 2131 08/28/23  1700 08/28/23  1246 08/28/23  0735 08/28/23  0641 08/27/23  0735 08/27/23  0533 08/26/23  1708 08/26/23  1447   NA  --   --   --   --   --   --   --  138  --  146*  --  144   POTASSIUM  --  3.5  --   --   --  3.6  --  3.4   < > 3.5   < > 3.2*  3.2*   CHLORIDE  --   --   --   --   --   --   --  104  --  112*  --  113*   CO2  --   --   --   --   --   --   --  21*  --  21*  --  17*   BUN  --   --   --   --   --   --   --  12.0  --  14.9  --  18.8   CR  --   --   --   --   --   --   --  0.78  --  0.86  --  0.87   ANIONGAP  --   --   --   --   --   --   --  13  --  13  --  14   MARGE  --   --   --   --   --   --   --  9.3  --  8.9  --  9.1   *  --  234* 218*   < >  --    < > 186*   < > 216*   < > 195*    < > = values in this interval not displayed.     Most Recent Cholesterol Panel:No lab results found.  Most Recent Hemoglobin A1c:  Recent Labs   Lab Test 08/25/23  0204   A1C 10.5*     Most Recent 6 glucoses:  Recent Labs   Lab Test 08/29/23  0716 08/29/23  0128 08/28/23  2131 08/28/23  1700 08/28/23  1201 08/28/23  0735   * 234* 218* 157* 195* 163*     Most Recent Urinalysis:  Recent Labs   Lab Test 08/25/23  0343   COLOR Light Yellow   APPEARANCE Clear   URINEGLC >=1000*   URINEBILI Negative   URINEKETONE >150*   SG 1.024   UBLD Moderate*   URINEPH 5.5   PROTEIN 100*   NITRITE Negative   LEUKEST Negative   RBCU <1   WBCU <1   ,    Results for orders placed or performed during the hospital encounter of 08/25/23   CT Chest (PE) Abdomen Pelvis w Contrast    Narrative    EXAM: CT CHEST PE ABDOMEN PELVIS W CONTRAST  LOCATION: [facility]  DATE: 8/25/2023    INDICATION: SOB, abdominal pain, ? sepsis.  COMPARISON: CT abdomen and pelvis on 06/09/2022.  TECHNIQUE: CT chest pulmonary angiogram and routine CT abdomen pelvis with IV contrast. Arterial phase through the chest and venous phase through the abdomen and pelvis. Multiplanar reformats and MIP reconstructions were performed. Dose reduction   techniques were used.   CONTRAST: 76mL Isovue 370    FINDINGS: The exam is mildly limited by motion/respiratory artifacts, within this limitation, there is;  ANGIOGRAM CHEST: Pulmonary arteries are normal caliber and negative for pulmonary emboli. Thoracic aorta is negative for dissection. No CT evidence of right heart strain.     MEDIASTINUM/AXILLAE: No cardiomegaly or significant pericardial effusion. No significant mediastinal or hilar lymphadenopathy. Diffuse esophageal wall thickening, which appears mildly distended with fluid.    LUNGS AND PLEURA: No pleural effusion or pneumothorax. No suspicious focal pulmonary opacities.    CORONARY ARTERY CALCIFICATION: Moderate.    ABDOMEN/PELVIS:    HEPATOBILIARY: No suspicious focal hepatic lesion. The gallbladder is unremarkable.    PANCREAS: No main pancreatic ductal dilatation or definite solid pancreatic mass.    SPLEEN: No splenomegaly.    ADRENAL GLANDS: 1 cm left adrenal nodule. No right adrenal nodule.    KIDNEYS/BLADDER: No radiodense kidney/ureteral stones or hydronephrosis in either kidney.    BOWEL: No abnormally dilated bowel loops. The appendix is visualized and appears normal.    PERITONEUM: No evidence of free fluid in the abdomen and pelvis. No free peritoneal or portal venous gas.    PELVIC ORGANS: Coarse prostatic calcification.    VASCULATURE: Unremarkable.    LYMPH  NODES: No significant abdominopelvic lymphadenopathy.    MUSCULOSKELETAL: Postsurgical changes of T10-L2 spinal fusion. Compression deformity of the T12 vertebra likely unchanged as compared to 2022 exam.      Impression    IMPRESSION: Exam is limited by motion/respiratory artifact, within this limitation, there is;  1. No evidence of thoracoabdominal aortic aneurysm or dissection.  2. Diffuse esophageal wall thickening, which appears mildly distended with fluid, findings can be seen with diffuse esophagitis.  3. 1 cm left adrenal nodule, not significantly changed as compared to 2022 exam, could represent adrenal adenoma.   Echocardiogram Complete     Value    LVEF  60-65%    Narrative    [id number]  [id number]  [id number]  [id number]^MICHELLE^ROSE MARIE^KELSEY     [facility]  Echocardiography Laboratory  [address] [place] [address]     Name: LESLIE PRESTON  MRN: [id number]  : 1960  Study Date: 2023 12:25 PM  Age: 63 yrs  Gender: Male  Patient [facility]  Reason For Study: SOB  Ordering Physician: ROSE MARIE MARTINEZ  Performed By: Shannan Sarmiento     BSA: 2.5 m2  Height: 74 in  Weight: 275 lb  HR: 90  BP: 144/90 mmHg  ______________________________________________________________________________  Procedure  Complete Portable Echo Adult. Optison (NDC #[id number]) given intravenously.  ______________________________________________________________________________  Interpretation Summary     The visual ejection fraction is 60-65%.  The right ventricle is normal in structure, function and size.  Borderline aortic root dilatation.  The ascending aorta is Mildly dilated.  There is no comparison study available. The study was technically difficult.  Optison contrast was used without apparent complications.  ______________________________________________________________________________  Left Ventricle  The left ventricle is normal in structure, function and size. There is  normal  left ventricular wall thickness. Left ventricular systolic function is normal.  The visual ejection fraction is 60-65%. Diastolic Doppler findings (E/E' ratio  and/or other parameters) suggest left ventricular filling pressures are  normal. No regional wall motion abnormalities noted.     Right Ventricle  The right ventricle is normal in structure, function and size.     Atria  Normal left atrial size. Right atrial size is normal. There is no color  Doppler evidence of an atrial shunt.     Mitral Valve  The mitral valve is normal in structure and function. There is trace mitral  regurgitation.     Tricuspid Valve  The tricuspid valve is normal in structure and function. There is trace  tricuspid regurgitation. The right ventricular systolic pressure is  approximated at 22.5 mmHg plus the right atrial pressure.     Aortic Valve  There is mild trileaflet aortic sclerosis. No aortic regurgitation is present.     Pulmonic Valve  The pulmonic valve is not well visualized.     Vessels  Borderline aortic root dilatation. The ascending aorta is Mildly dilated.     Pericardium  There is no pericardial effusion.     Rhythm  Sinus rhythm was noted.  ______________________________________________________________________________  MMode/2D Measurements & Calculations     IVSd: 1.0 cm  LVIDd: 5.2 cm  LVIDs: 3.9 cm  LVPWd: 1.1 cm  IVC diam: 2.3 cm  FS: 26.1 %  LV mass(C)d: 207.6 grams  LV mass(C)dI: 83.4 grams/m2  Ao root diam: 3.9 cm  LA dimension: 4.3 cm  asc Aorta Diam: 4.0 cm  LA/Ao: 1.1  LVOT diam: 2.3 cm  LVOT area: 4.0 cm2  Ao root diam Index (cm/m2): 1.6  asc Aorta Diam Index (cm/m2): 1.6  LA Volume (BP): 63.2 ml     LA Volume Index (BP): 25.4 ml/m2  RV Base: 3.3 cm  RWT: 0.40  TAPSE: 1.7 cm     Doppler Measurements & Calculations  MV E max arnaud: 78.1 cm/sec  MV A max arnaud: 113.2 cm/sec  MV E/A: 0.69  MV max PG: 3.6 mmHg  MV mean P.1 mmHg  MV V2 VTI: 23.9 cm  MVA(VTI): 3.8 cm2  MV P1/2t max arnaud: 109.1 cm/sec  MV  P1/2t: 51.8 msec  MVA(P1/2t): 4.3 cm2  MV dec slope: 617.3 cm/sec2  MV dec time: 0.19 sec  Ao V2 max: 189.0 cm/sec  Ao max P.0 mmHg  Ao V2 mean: 132.0 cm/sec  Ao mean P.0 mmHg  Ao V2 VTI: 34.5 cm  VIK(I,D): 2.7 cm2  VIK(V,D): 2.7 cm2  LV V1 max P.4 mmHg  LV V1 max: 126.0 cm/sec  LV V1 VTI: 22.6 cm  SV(LVOT): 91.5 ml  SI(LVOT): 36.8 ml/m2  PA V2 max: 106.0 cm/sec  PA max P.5 mmHg  PA mean PG: 3.0 mmHg  PA V2 VTI: 18.9 cm  PA acc time: 0.13 sec  TR max jason: 237.4 cm/sec  TR max P.5 mmHg  AV Jason Ratio (DI): 0.67  VIK Index (cm2/m2): 1.1     E/E' av.1  Lateral E/e': 6.6  Medial E/e': 9.7  RV S Jason: 12.9 cm/sec     ______________________________________________________________________________  Report approved by: [address] 2023 01:59 PM             Discharge Medications   Current Discharge Medication List        START taking these medications    Details   Alcohol Swabs PADS Use to swab the area of the injection or benoit as directed Per insurance coverage  Qty: 100 each, Refills: 0    Associated Diagnoses: Diabetic ketoacidosis without coma associated with type 2 diabetes mellitus (H)      blood glucose (NO BRAND SPECIFIED) lancets standard To use to test glucose level in the blood Use to test blood sugar  3  times daily as directed. To accompany glucose monitor brands per insurance coverage.  Qty: 100 each, Refills: 0    Associated Diagnoses: Diabetic ketoacidosis without coma associated with type 2 diabetes mellitus (H)      blood glucose (NO BRAND SPECIFIED) test strip To use to test glucose level in the blood Use to test blood sugar  3 times daily as directed. To accompany glucose monitor brands per insurance coverage.  Qty: 100 strip, Refills: 0    Associated Diagnoses: Diabetic ketoacidosis without coma associated with type 2 diabetes mellitus (H)      blood glucose calibration (NO BRAND SPECIFIED) solution Used to calibrate the blood glucose monitor as needed and as  directed.  To accompany  blood glucose brands per insurance coverage  Qty: 1 each, Refills: 0    Associated Diagnoses: Diabetic ketoacidosis without coma associated with type 2 diabetes mellitus (H)      blood glucose monitoring (NO BRAND SPECIFIED) meter device kit Check blood sugar 3 times a day  Qty: 1 kit, Refills: 0    Associated Diagnoses: Diabetic ketoacidosis without coma associated with type 2 diabetes mellitus (H)      !! insulin aspart (NOVOLOG PEN) 100 UNIT/ML pen 1 unit for every 10 grams of CHO TID during meal time  Qty: 15 mL, Refills: 0    Associated Diagnoses: Diabetic ketoacidosis without coma associated with type 2 diabetes mellitus (H)      !! insulin aspart (NOVOLOG PEN) 100 UNIT/ML pen For Pre-Meal  - 189 give 1 unit. For Pre-Meal  - 239 give 2 units. For Pre-Meal  - 289 give 3 units. For Pre-Meal  - 339 give 4 units. For Pre-Meal -399 give 5 units. For Pre-Meal -449 give 6 units. For Pre-Meal BG = or > 450 give 7 units.  Qty: 15 mL, Refills: 0    Associated Diagnoses: Diabetic ketoacidosis without coma associated with type 2 diabetes mellitus (H)      insulin glargine (LANTUS PEN) 100 UNIT/ML pen Inject 25 Units Subcutaneous 2 times daily  Qty: 15 mL, Refills: 0    Comments: If Lantus is not covered by insurance, may substitute Basaglar or Semglee or other insulin glargine product per insurance preference at same dose and frequency.    Associated Diagnoses: Diabetic ketoacidosis without coma associated with type 2 diabetes mellitus (H)      losartan (COZAAR) 25 MG tablet Take 1 tablet (25 mg) by mouth daily  Qty: 30 tablet, Refills: 0    Associated Diagnoses: Benign essential hypertension      pantoprazole (PROTONIX) 40 MG EC tablet Take 1 tablet (40 mg) by mouth 2 times daily (before meals)  Qty: 60 tablet, Refills: 0    Associated Diagnoses: Esophagitis       !! - Potential duplicate medications found. Please discuss with provider.        CONTINUE these  medications which have NOT CHANGED    Details   acetaminophen (TYLENOL) 325 MG tablet Take 2 tablets (650 mg) by mouth every 6 hours as needed for other (For optimal non-opioid multimodal pain management to improve pain control.)  Qty: 60 tablet, Refills: 0    Associated Diagnoses: T12 burst fracture (H)      albuterol (PROAIR HFA/PROVENTIL HFA/VENTOLIN HFA) 108 (90 Base) MCG/ACT inhaler Inhale 2 puffs into the lungs every 4 hours as needed      budesonide (PULMICORT FLEXHALER) 180 MCG/ACT inhaler Inhale 2 puffs into the lungs 2 times daily      fluticasone (FLONASE) 50 MCG/ACT nasal spray Spray 2 sprays into both nostrils daily      !! methocarbamol (ROBAXIN) 500 MG tablet Take 1 tablet (500 mg) by mouth 3 times daily  Qty: 30 tablet, Refills: 0    Associated Diagnoses: S/P spinal surgery      !! methocarbamol (ROBAXIN) 750 MG tablet Take 1 tablet (750 mg) by mouth every 6 hours as needed for muscle spasms  Qty: 30 tablet, Refills: 0    Associated Diagnoses: S/P spinal surgery       !! - Potential duplicate medications found. Please discuss with provider.        STOP taking these medications       meloxicam (MOBIC) 7.5 MG tablet Comments:   Reason for Stopping:             Allergies   Allergies   Allergen Reactions    Bee Venom

## 2023-08-29 NOTE — PLAN OF CARE
"/62 (BP Location: Left arm)   Pulse 72   Temp 98  F (36.7  C) (Oral)   Resp 18   Ht 1.88 m (6' 2\")   Wt 124.8 kg (275 lb 2.2 oz)   SpO2 97%   BMI 35.33 kg/m      Shift Summary Note  1900-0730    Orientation: Aox4  VS: VSS  LS: O2 sats stable on RA  Pain: denies  Activity:  SBA GB   LDAs: 2 PIV L + R  Diet: Mod Carb  Pertinent Labs:           , 234    Protocols: K, Mg, Phos recheck in a.m.    Plan: reinforce diabetic Ed, home meds in med room  Discharge:  Possible discharge tomorrow      Sarita Mohan, CYNTHIA  August 29, 2023      Goal Outcome Evaluation:       Overall: Progressing  Care Plan Reviewed: With Patient                 "

## 2023-08-30 ENCOUNTER — NURSE TRIAGE (OUTPATIENT)
Dept: NURSING | Facility: CLINIC | Age: 63
End: 2023-08-30
Payer: COMMERCIAL

## 2023-08-30 LAB — BACTERIA BLD CULT: NO GROWTH

## 2023-08-30 NOTE — TELEPHONE ENCOUNTER
Pt had questions about giving himself insulin that writer was able to answer     No triage     Francoise Eaton RN  [place] Nurse Advisor  6:59 AM 8/30/2023      Reason for Disposition   Health Information question, no triage required and triager able to answer question    Additional Information   Negative: [1] Caller is not with the adult (patient) AND [2] reporting urgent symptoms   Negative: Lab result questions   Negative: Medication questions   Negative: Caller can't be reached by phone   Negative: Caller has already spoken to PCP or another triager   Negative: RN needs further essential information from caller in order to complete triage   Negative: Requesting regular office appointment   Negative: [1] Caller requesting NON-URGENT health information AND [2] PCP's office is the best resource    Protocols used: Information Only Call - No Triage-A-

## 2023-08-31 ENCOUNTER — PATIENT OUTREACH (OUTPATIENT)
Dept: CARE COORDINATION | Facility: CLINIC | Age: 63
End: 2023-08-31
Payer: COMMERCIAL

## 2023-08-31 ENCOUNTER — NURSE TRIAGE (OUTPATIENT)
Dept: INTERNAL MEDICINE | Facility: CLINIC | Age: 63
End: 2023-08-31
Payer: COMMERCIAL

## 2023-08-31 ENCOUNTER — NURSE TRIAGE (OUTPATIENT)
Dept: NURSING | Facility: CLINIC | Age: 63
End: 2023-08-31
Payer: COMMERCIAL

## 2023-08-31 ENCOUNTER — HOSPITAL ENCOUNTER (EMERGENCY)
Facility: CLINIC | Age: 63
Discharge: HOME | End: 2023-08-31
Attending: EMERGENCY MEDICINE | Admitting: EMERGENCY MEDICINE
Payer: COMMERCIAL

## 2023-08-31 VITALS
TEMPERATURE: 98.2 F | RESPIRATION RATE: 18 BRPM | SYSTOLIC BLOOD PRESSURE: 119 MMHG | DIASTOLIC BLOOD PRESSURE: 83 MMHG | WEIGHT: 286.6 LBS | OXYGEN SATURATION: 98 % | HEART RATE: 108 BPM | BODY MASS INDEX: 36.8 KG/M2

## 2023-08-31 DIAGNOSIS — R60.0 PERIPHERAL EDEMA: ICD-10-CM

## 2023-08-31 PROCEDURE — 99283 EMERGENCY DEPT VISIT LOW MDM: CPT

## 2023-08-31 RX ORDER — FUROSEMIDE 20 MG
20 TABLET ORAL DAILY
Qty: 5 TABLET | Refills: 0 | Status: SHIPPED | OUTPATIENT
Start: 2023-08-31 | End: 2023-10-02

## 2023-08-31 ASSESSMENT — ACTIVITIES OF DAILY LIVING (ADL): ADLS_ACUITY_SCORE: 33

## 2023-08-31 NOTE — ED PROVIDER NOTES
"  History     Chief Complaint:  Leg Swelling       HPI   Kalen Callejas is a 63 year old male presents to the ED due to leg swelling.  The patient notes that he was recently in the hospital for \"diabetic shock\".  He states that he currently feels great and notes that his blood sugar is 105.  He returns because he notes swelling of his lower extremities and specifically noted that when he tried to put his shoes on today his feet seem swollen.  He notes that in the past he may have had times when his socks would be tight and they might of caused a line in his shin but not to this extent.  He denies any shortness of breath, nausea, or vomiting..      Independent Historian:    None - Patient Only    Review of External Notes:  DC Summary 8/29/23    Allergies:  Bee Venom     Physical Exam   Patient Vitals for the past 24 hrs:   BP Temp Temp src Pulse Resp SpO2 Weight   08/31/23 1513 119/83 -- -- 108 18 98 % --   08/31/23 1136 (!) 141/98 98.2  F (36.8  C) Temporal (!) 122 18 99 % --   08/31/23 1130 -- -- -- -- -- -- 130 kg (286 lb 9.6 oz)        Physical Exam  Constitutional: Vital signs reviewed as above.   Head: No external signs of trauma noted.  Eyes: Pupils are equal, round, and reactive to light.   Neck: No JVD noted  Cardiovascular: Slightly tachycardic rate, Regular rhythm and normal heart sounds.  No murmur heard. Equal B/L peripheral pulses.  Pulmonary/Chest: Effort normal and breath sounds normal. No respiratory distress. Patient has no wheezes. Patient has no rales.   Gastrointestinal: Soft. There is no tenderness.   Musculoskeletal/Extremities: Bilateral lower extremity +1 pitting edema. Normal tone.  Neurological: Patient is alert and oriented to person, place, and time.   Skin: Skin is warm and dry. There is no diaphoresis noted.   Psychiatric: The patient appears calm.      Emergency Department Course       Emergency Department Course & Assessments:             Interventions:  Medications - No data to " display     Assessments, Independent Interpretation, Consult/Discussion of ManagementTests:       Social Determinants of Health affecting care:  None    Disposition:  The patient was discharged to home.     Impression & Plan    CMS Diagnoses: None    Code Status: Prior    Medical Decision Making:    This 63 year old male presents to the ED due to Leg Swelling   . Please see the HPI and exam for specifics. A broad differential was considered including DVT, peripheral edema, renal/hepatic dysfunction, etc.    After my evaluation of the patient, I did not feel that laboratory studies were needed.  We discussed patient's recent hospitalization and his IV fluids and how that likely has contributed to the peripheral edema noted today.  It is certainly possible, though I think it is less likely, that the patient could have bilateral DVTs.  After shared decision-making discussion, we will not order an ultrasound today and instead discharge the patient with Lasix for the next 5 days.  I think the close primary care follow-up is reasonable and return to the ED is recommended with new or worsening symptoms.     Critical Care time:  was 0 minutes for this patient excluding procedures.    Diagnosis:    ICD-10-CM    1. Peripheral edema  R60.9            Discharge Medications:  New Prescriptions    FUROSEMIDE (LASIX) 20 MG TABLET    Take 1 tablet (20 mg) by mouth daily for 5 days          8/31/2023   Jimbo Ibanez DO Burns, Bradley Joseph, DO  08/31/23 1514

## 2023-08-31 NOTE — TELEPHONE ENCOUNTER
Pt states his feet are swollen. Started last night. Pt states Left one is bigger than right.   Pt states he is sitting a lot more. He was just discharged from Hospital on 8/29. Pt does not have PCP.   BS are stable. 105 this AM.     Feet are Not painful, not red or blue. Just dry. Ankles and calves are not swollen.   No chest pain or short of breath.     Advised pt to go back to ED for evaluation of his swollen feet. He is hesitant about this and is going to wait to see if swelling improves after elevating first. Informed him that he may need US to rule out blood clot and that this would be done at ED. He verbalized understanding.         Reason for Disposition   Thigh, calf, or ankle swelling and bilateral and 1 side is more swollen    Additional Information   Negative: Chest pain   Negative: Followed an ankle injury   Negative: Followed a bee sting and has localized swelling (e.g., small area of puffy or swollen skin)   Negative: Followed an insect bite and has localized swelling (e.g., small area of puffy or swollen skin)   Negative: Ankle pain is main symptom   Negative: Swelling of both ankles (i.e., pedal edema)   Negative: Swelling of calf or leg is main symptom   Negative: Difficulty breathing   Negative: Entire foot is cool or blue in comparison to other side   Negative: Ankle pain and fever   Negative: Ankle redness and fever   Negative: Patient sounds very sick or weak to the triager   Negative: SEVERE pain (e.g., excruciating, unable to walk) and not improved after 2 hours of pain medicine   Negative: Redness and painful when touched and no fever   Negative: Red area or streak > 2 inches (or 5 cm)   Negative: Thigh or calf pain and only 1 side and present > 1 hour   Negative: Thigh, calf, or ankle swelling and only 1 side    Protocols used: Ankle Swelling-A-OH

## 2023-08-31 NOTE — TELEPHONE ENCOUNTER
Blood sugar this morning 105 breakfast    8/30/23 blood sugars were   239 breakfast,   219 lunch,   239 dinner,   197 HS    Patient called to clarify blood sugar and insulin administration.  Reviewed novolog carb coverage and correction dosing with patient. Patient verbalized understanding of correct administration of insulin, and carb counting as ordered. Patient is taking both Novolog and Lantus.  Patient stated that he will call again if he has any more questions.    Prema Tesfaye RN on 8/31/2023 at 6:45 AM        Reason for Disposition    [1] Follow-up call to recent contact AND [2] information only call, no triage required    Protocols used: Information Only Call - No Triage-A-

## 2023-08-31 NOTE — PROGRESS NOTES
[facility]    Background: Transitional Care Management program identified per system criteria and reviewed by [facility] team for possible outreach.    Assessment:  Upon chart review, patient is in communication with a clinic team member, nurse or provider within [facility] for reason of discussing hospital follow up plan of care and answering questions patient may have related to discharge instructions. [facility] Team member will update episode status of Transitional Care Management program to enrolled per system workflows.     Per chart review, patient has called nurse triage twice with questions regarding insulin/BG readings, 8/30/23 & again this morning (please see Nurse Triage encounters for details).    [facility] made first outreach attempt on 8/30/2023 to reach patient for hospital follow up and left message and that time.    Plan: [facility] will make no additional outreach attempts to minimize duplicative efforts and reduce potential confusion for patient.      Mariann Rodriguez RN  [facility], [facility]    *Connected Care Resource Team does NOT follow patient ongoing. Referrals are identified based on internal discharge reports and the outreach is to ensure patient has an understanding of their discharge instructions.

## 2023-08-31 NOTE — ED TRIAGE NOTES
Patient reports bilateral leg swelling. Patient was recently hospitalized. Concerned for DVT Denies pain. No blood thinners.

## 2023-08-31 NOTE — DISCHARGE INSTRUCTIONS
"What do you do next:   Continue your home medications unless we have specifically changed them  Take the \"furosemide\" I have prescribed as directed.  Follow up as indicated below    When do you return: If you have severe shortness of breath, bluish/purplish discoloration of your legs, continually worsening swelling of your legs, or any other symptoms that concern you, please return to the ED for reevaluation.    Thank you for allowing us to care for you today.    "

## 2023-09-01 ENCOUNTER — NURSE TRIAGE (OUTPATIENT)
Dept: NURSING | Facility: CLINIC | Age: 63
End: 2023-09-01
Payer: COMMERCIAL

## 2023-09-01 NOTE — TELEPHONE ENCOUNTER
Nurse Triage SBAR    Situation:   -Edema    Background:   -Patient calling, It is okay to leave a detailed message at this number.     Assessment:   -swollen feet, bilateral  -called yesterday, went to ED - was gieven 5 water pills  -was fine this AM  -did lots of  walking  -up to ankels  -no chest pain  -no difficulty  breathing  -no fever  -left is slightly more swollen than right    Recommendation:   See HCP Within 4 Hours (Or PCP Triage)   -he declines this dispassion  -we discussed when to call back   -Warm transferred to central scheduling to make an appointment    JUANI ROSS RN on 9/1/2023 at 6:07 PM     Reason for Disposition   [1] Thigh, calf, or ankle swelling AND [2] bilateral AND [3] 1 side is more swollen    Additional Information   Negative: Chest pain   Negative: Followed a leg injury   Negative: [1] Followed an insect bite AND [2] localized swelling (e.g., small area of puffy or swollen skin)   Negative: Swelling of one ankle joint   Negative: Swelling of knee is main symptom   Negative: Pregnant   Negative: Postpartum (from 0 to 6 weeks after delivery)   Negative: [1] Heart failure suspected (e.g., ankle swelling, shortness of breath, weight gain) AND [2] recently in hospital for heart failure   Negative: SEVERE difficulty breathing (e.g., struggling for each breath, speaks in single words)   Negative: Looks like a broken bone or dislocated joint (e.g., crooked or deformed)   Negative: Sounds like a life-threatening emergency to the triager   Negative: Difficulty breathing at rest   Negative: Entire foot is cool or blue in comparison to other side   Negative: [1] Can't walk or can barely walk AND [2] new-onset   Negative: [1] Difficulty breathing with exertion (e.g., walking) AND [2] new-onset or worsening   Negative: [1] Red area or streak AND [2] fever   Negative: [1] Swelling is painful to touch AND [2] fever   Negative: [1] Cast on leg or ankle AND [2] now increased pain   Negative:  Patient sounds very sick or weak to the triager   Negative: SEVERE leg swelling (e.g., swelling extends above knee, entire leg is swollen, weeping fluid)   Negative: [1] Red area or streak [2] large (> 2 in. or 5 cm)   Negative: [1] Thigh or calf pain AND [2] only 1 side AND [3] present > 1 hour   Negative: [1] Thigh, calf, or ankle swelling AND [2] only 1 side    Protocols used: Leg Swelling and Edema-A-AH

## 2023-09-02 LAB
BACTERIA BLD CULT: ABNORMAL
BACTERIA BLD CULT: ABNORMAL

## 2023-09-04 ENCOUNTER — TELEPHONE (OUTPATIENT)
Dept: NURSING | Facility: CLINIC | Age: 63
End: 2023-09-04
Payer: COMMERCIAL

## 2023-09-04 NOTE — TELEPHONE ENCOUNTER
Telephone call    Patient calling he is concerned he is new to taking insulin he is pretty sure he took his insulin but he must of recapped his needle the insulin was out of the pen so he was concerned.  He takes his blood sugar  3 times a day and it was 129 this morning he will call back if there is a big spike in his  blood sugar otherwise he will continue as prescribed.    Rossy Lozano RN   [facility] Nurse Advisor  8:34 AM 9/4/2023

## 2023-09-13 ENCOUNTER — TELEPHONE (OUTPATIENT)
Dept: INTERNAL MEDICINE | Facility: CLINIC | Age: 63
End: 2023-09-13

## 2023-09-13 DIAGNOSIS — E11.10 DIABETIC KETOACIDOSIS WITHOUT COMA ASSOCIATED WITH TYPE 2 DIABETES MELLITUS (H): ICD-10-CM

## 2023-09-13 RX ORDER — BLOOD PRESSURE TEST KIT
KIT MISCELLANEOUS
Qty: 100 EACH | Refills: 0 | Status: SHIPPED | OUTPATIENT
Start: 2023-09-13 | End: 2023-10-02

## 2023-09-13 RX ORDER — BLOOD PRESSURE TEST KIT
KIT MISCELLANEOUS
Qty: 100 EACH | Refills: 0 | Status: CANCELLED | OUTPATIENT
Start: 2023-09-13

## 2023-09-13 NOTE — TELEPHONE ENCOUNTER
Patient has a new pt appt with Santa Larkin on 10/2/2023  He may run out of medication before he is seen. Patient wants to know if he can be worked in sooner or will diabetic meds be filled to get him to his appt. Ok to call and seamus [phone]

## 2023-09-13 NOTE — TELEPHONE ENCOUNTER
Call to pt. He needs more test strips, pen needles, alcohol pads, and Lantus insulin, he only has 2 pens left, about a week left.     Huddles with Heather Edward CNP. She agrees to refill until pts appt in October, 2, 2023 with Santa Larkin CNP.

## 2023-09-21 DIAGNOSIS — I10 BENIGN ESSENTIAL HYPERTENSION: ICD-10-CM

## 2023-09-21 RX ORDER — LOSARTAN POTASSIUM 25 MG/1
25 TABLET ORAL DAILY
Qty: 30 TABLET | Refills: 0 | Status: SHIPPED | OUTPATIENT
Start: 2023-09-21 | End: 2023-10-02

## 2023-09-21 NOTE — TELEPHONE ENCOUNTER
Patient has a new pt appt with Santa Larkin on 10/2/2023.  Heather Wagner has refilled other medications.    Patient only has one pill left.  Please send Losartan refill to Pharmacy as soon as possible.

## 2023-09-25 ENCOUNTER — TELEPHONE (OUTPATIENT)
Dept: INTERNAL MEDICINE | Facility: CLINIC | Age: 63
End: 2023-09-25
Payer: COMMERCIAL

## 2023-09-25 NOTE — TELEPHONE ENCOUNTER
Patient calls, states he is unsure if the his Basaglar Insulin was injected when he clicked the pen this morning. States his current BG is 81 and it is usually really steady.  Informed patient to keep monitoring BG, he will next check his blood glucose at around 1230. He will call if it is abnormal.  Informed patient not to double dose, he will wait to administer any insulin. His plan is to monitor and keep checking. If numbers are not high will just continue his regular routine.  Will forward to ANN Edward.

## 2023-09-26 NOTE — TELEPHONE ENCOUNTER
This is not my patient. Please send this to his PCP Son Layton Bermudez.  I am a same day provider and do not do any primary care. His PCP should be notified of this. Thank you!

## 2023-09-27 NOTE — COMMUNITY RESOURCES LIST (ENGLISH)
09/27/2023   [facility]  N/A  For questions about this resource list or additional care needs, please contact your primary care clinic or care manager.  Phone: [phone]   Email: N/A   Address: [address] [place] [id number]   Hours: N/A        Financial Stability       Utility payment assistance  1  [facility] Distance: 0.5 miles      In-Person, Phone/Virtual   [address] [place] [address]  Language: English  Hours: Mon 4:00 PM - 6:00 PM , Tue 11:00 AM - 1:00 PM , Wed 4:00 PM - 6:00 PM , Thu 10:30 AM - 12:30 PM  Fees: Free   Phone: (765) 551-8225 Email: resources@popmn.org Website: https://popmn.org/mission/mission-outpost/     2  [address] [place] [address] Distance: 1.77 miles      In-Person, Phone/Virtual   [address] [address]  Language: English, Greek  Hours: Mon - Thu 9:00 AM - 4:00 PM  Fees: Free   Phone: (617) 754-2633 Email: info@[employer].org Website: https://mPort.org/          Important Numbers & Websites       Emergency Services   911  [facility]   311  Poison Control   (900) 603-6557  Suicide Prevention Lifeline   (393) 890-5405 (TALK)  Child Abuse Hotline   (548) 870-5367 (4-A-Child)  Sexual Assault Hotline   (494) 755-9352 (HOPE)  National Runaway Safeline   (482) 790-8402 (RUNAWAY)  All-Options Talkline   (791) 611-4725  Substance Abuse Referral   (216) 138-8653 (HELP)

## 2023-10-02 ENCOUNTER — OFFICE VISIT (OUTPATIENT)
Dept: INTERNAL MEDICINE | Facility: CLINIC | Age: 63
End: 2023-10-02
Payer: COMMERCIAL

## 2023-10-02 ENCOUNTER — TELEPHONE (OUTPATIENT)
Dept: INTERNAL MEDICINE | Facility: CLINIC | Age: 63
End: 2023-10-02

## 2023-10-02 VITALS
RESPIRATION RATE: 20 BRPM | WEIGHT: 283 LBS | TEMPERATURE: 98.3 F | OXYGEN SATURATION: 96 % | HEIGHT: 73 IN | BODY MASS INDEX: 37.51 KG/M2 | HEART RATE: 110 BPM | SYSTOLIC BLOOD PRESSURE: 146 MMHG | DIASTOLIC BLOOD PRESSURE: 93 MMHG

## 2023-10-02 DIAGNOSIS — I10 BENIGN ESSENTIAL HYPERTENSION: ICD-10-CM

## 2023-10-02 DIAGNOSIS — E11.9 TYPE 2 DIABETES MELLITUS WITHOUT COMPLICATION, WITH LONG-TERM CURRENT USE OF INSULIN (H): Primary | ICD-10-CM

## 2023-10-02 DIAGNOSIS — E66.01 CLASS 2 SEVERE OBESITY DUE TO EXCESS CALORIES WITH SERIOUS COMORBIDITY AND BODY MASS INDEX (BMI) OF 37.0 TO 37.9 IN ADULT (H): ICD-10-CM

## 2023-10-02 DIAGNOSIS — Z79.4 TYPE 2 DIABETES MELLITUS WITHOUT COMPLICATION, WITH LONG-TERM CURRENT USE OF INSULIN (H): Primary | ICD-10-CM

## 2023-10-02 DIAGNOSIS — Z23 FLU VACCINE NEED: ICD-10-CM

## 2023-10-02 DIAGNOSIS — E11.10 DIABETIC KETOACIDOSIS WITHOUT COMA ASSOCIATED WITH TYPE 2 DIABETES MELLITUS (H): ICD-10-CM

## 2023-10-02 DIAGNOSIS — E66.812 CLASS 2 SEVERE OBESITY DUE TO EXCESS CALORIES WITH SERIOUS COMORBIDITY AND BODY MASS INDEX (BMI) OF 37.0 TO 37.9 IN ADULT (H): ICD-10-CM

## 2023-10-02 PROCEDURE — 90471 IMMUNIZATION ADMIN: CPT

## 2023-10-02 PROCEDURE — 90682 RIV4 VACC RECOMBINANT DNA IM: CPT

## 2023-10-02 PROCEDURE — 99204 OFFICE O/P NEW MOD 45 MIN: CPT | Mod: 25

## 2023-10-02 RX ORDER — BLOOD PRESSURE TEST KIT
KIT MISCELLANEOUS
Qty: 450 EACH | Refills: 3 | Status: SHIPPED | OUTPATIENT
Start: 2023-10-02

## 2023-10-02 RX ORDER — LOSARTAN POTASSIUM 50 MG/1
50 TABLET ORAL DAILY
Qty: 90 TABLET | Refills: 0 | Status: SHIPPED | OUTPATIENT
Start: 2023-10-02 | End: 2023-11-30

## 2023-10-02 RX ORDER — LOSARTAN POTASSIUM 50 MG/1
50 TABLET ORAL DAILY
Qty: 90 TABLET | Refills: 0 | Status: SHIPPED | OUTPATIENT
Start: 2023-10-02 | End: 2023-10-02

## 2023-10-02 NOTE — PROGRESS NOTES
Assessment & Plan     (E11.9,  Z79.4) Type 2 diabetes mellitus without complication, with long-term current use of insulin (H)  (primary encounter diagnosis)  Comment: Pt with history of type 2 diabetes. Per chart review it looks like A1C about a year ago was 9.2%. He was hospitalized in August of 2023 with DKA, was previously not on any medication. Recent A1C on 8/25/23 was 10.5%. Upon discharge, he was started on novolog with sliding scale at mealtimes and 1 unit for every 10g of CHO TID, as well as Lantus 25 units BID. His wife and daughter both have diabetes. He has not yet seen diabetes education. I will place referral. He could use teaching to help him understand diet, carbohydrate counting (if needed). blood sugar ranges, A1C values, etc.   He has not been using his Novlog as directed as his BG has been consistently <140 prior to meals. He has not been giving insulin for carb coverage either as he hasn't understood how to count carbohydrates yet. We discussed that the diabetes educator will go over this with him- he may not need novolog at this time as his blood sugars are currently under great control.  Plan: [facility] Adult Diabetes Educator Referral,         Hemoglobin A1c            (E11.10) Diabetic ketoacidosis without coma associated with type 2 diabetes mellitus (H)  Comment:   Plan: Lipid panel reflex to direct LDL Fasting,         Albumin Random Urine Quantitative with Creat         Ratio            (I10) Benign essential hypertension  Comment:     BP today is elevated at 146/93, he was started on Losartan 25MG. I will have him increase to 50MG. He will continue to monitor BP at home. Will see me in 8 weeks.  Plan: Basic metabolic panel  (Ca, Cl, CO2, Creat,         Gluc, K, Na, BUN), losartan (COZAAR) 50 MG         tablet            (E66.01,  Z68.37) Class 2 severe obesity due to excess calories with serious comorbidity and body mass index (BMI) of 37.0 to 37.9 in adult (H)  Comment: BMI of 37.34 with  "co morbidities of HTN and T2DM. Moderate weight loss will help significantly to lower BP and A1C. He is working on increasing amount of steps he gets per day. Set goal to 5,000 steps.  Plan:     (Z23) Flu vaccine need  Comment:   Plan: INFLUENZA VACCINE 18-64Y (FLUBLOK)           He is scheduled to see [facility] next month- he had one episode of black tarry stool in the hospital but notes this has since resolved. They offered endoscopy in the hospital but pt declined at the time. He was recommended to take Protonix for one month post discharge which he did.      Follows with Asthma/Allergy for Asthma- controlled well on Pulmicort BID         45 minutes spent by me on the date of the encounter doing chart review, history and exam, documentation and further activities per the note     MED REC REQUIRED  Post Medication Reconciliation Status:  Discharge medications reconciled and changed, see notes/orders  BMI:   Estimated body mass index is 37.34 kg/m  as calculated from the following:    Height as of this encounter: 1.854 m (6' 1\").    Weight as of this encounter: 128.4 kg (283 lb).   Weight management plan: Discussed healthy diet and exercise guidelines        Santa Larkin, REVA CNP  [facility] [place]                Subjective   Bill is a 63 year old, presenting for the following health issues:  Foot Swelling, Diabetes, and Hypertension      HPI       Diabetes Follow-up    How often are you checking your blood sugar? Three times daily  Blood sugar testing frequency justification:  Uncontrolled diabetes  What time of day are you checking your blood sugars (select all that apply)?  Before meals  Have you had any blood sugars above 200?  No  Have you had any blood sugars below 70?  No  What symptoms do you notice when your blood sugar is low?  Mengky  What concerns do you have today about your diabetes? None   Do you have any of these symptoms? (Select all that apply)  No numbness or tingling in feet.  No " "redness, sores or blisters on feet.  No complaints of excessive thirst.  No reports of blurry vision.  No significant changes to weight.  Have you had a diabetic eye exam in the last 12 months? No        BP Readings from Last 2 Encounters:   08/31/23 119/83   08/29/23 124/80     Hemoglobin A1C (%)   Date Value   08/25/2023 10.5 (H)   06/13/2022 9.2 (H)             Hypertension Follow-up    Do you check your blood pressure regularly outside of the clinic? Yes   Are you following a low salt diet? No  Are your blood pressures ever more than 140 on the top number (systolic) OR more   than 90 on the bottom number (diastolic), for example 140/90? Yes  How many servings of fruits and vegetables do you eat daily?  0-1  On average, how many sweetened beverages do you drink each day (Examples: soda, juice, sweet tea, etc.  Do NOT count diet or artificially sweetened beverages)?   0  How many days per week do you exercise enough to make your heart beat faster? 7  How many minutes a day do you exercise enough to make your heart beat faster? 30 - 60  How many days per week do you miss taking your medication? 0        Objective    Pulse (!) 139   Temp 98.3  F (36.8  C) (Oral)   Resp 20   Ht 1.854 m (6' 1\")   Wt 128.4 kg (283 lb)   SpO2 96%   BMI 37.34 kg/m    Body mass index is 37.34 kg/m .      Physical Exam  Constitutional:       General: He is not in acute distress.     Appearance: Normal appearance. He is not ill-appearing, toxic-appearing or diaphoretic.   HENT:      Head: Normocephalic and atraumatic.   Eyes:      Conjunctiva/sclera: Conjunctivae normal.   Cardiovascular:      Rate and Rhythm: Normal rate and regular rhythm.      Heart sounds: Normal heart sounds.   Pulmonary:      Effort: Pulmonary effort is normal.      Breath sounds: Normal breath sounds.   Skin:     General: Skin is warm and dry.   Neurological:      Mental Status: He is alert and oriented to person, place, and time.   Psychiatric:         Mood " and Affect: Mood normal.         Behavior: Behavior normal.         Thought Content: Thought content normal.         Judgment: Judgment normal.

## 2023-10-02 NOTE — PATIENT INSTRUCTIONS
See me in 8 weeks with labs prior. These labs should be done fasting. Have the labs done at least 2-3 days prior so we can review these at your appointment with me. You will need to make a lab only appt for this.    Someone will call you to schedule an appt with diabetes education.

## 2023-10-02 NOTE — COMMUNITY RESOURCES LIST (ENGLISH)
10/02/2023   [facility]  N/A  For questions about this resource list or additional care needs, please contact your primary care clinic or care manager.  Phone: [phone]   Email: N/A   Address: [address] [place] [address]   Hours: N/A        Financial Stability       Utility payment assistance  1  [facility] [place] [facility] Distance: 0.5 miles      In-Person, Phone/Virtual   [address] [place] [address]  Language: English  Hours: Mon 4:00 PM - 6:00 PM , Tue 11:00 AM - 1:00 PM , Wed 4:00 PM - 6:00 PM , Thu 10:30 AM - 12:30 PM  Fees: Free   Phone: (499) 705-5850 Email: resources@popmn.org Website: https://popmn.org/mission/mission-outpost/     2  [address] [place] [address] Distance: 1.77 miles      In-Person, Phone/Virtual   [address] [address]  Language: English, Croatian  Hours: Mon - Thu 9:00 AM - 4:00 PM  Fees: Free   Phone: (259) 414-6199 Email: info@[employer].org Website: https://[employer].org/          Important Numbers & Websites       Emergency Services   911  [facility]   311  Poison Control   (169) 747-9114  Suicide Prevention Lifeline   (943) 850-7319 (TALK)  Child Abuse Hotline   (551) 374-7015 (4-A-Child)  Sexual Assault Hotline   (737) 318-6518 (HOPE)  National Runaway Safeline   (610) 715-3522 (RUNAWAY)  All-Options Talkline   (432) 898-4813  Substance Abuse Referral   (310) 750-3604 (HELP)

## 2023-10-02 NOTE — TELEPHONE ENCOUNTER
Patient calls to ask us to change his test strips and lancets to say testing 5 times daily.   DISPLAY PLAN FREE TEXT DISPLAY PLAN FREE TEXT DISPLAY PLAN FREE TEXT DISPLAY PLAN FREE TEXT

## 2023-10-19 ENCOUNTER — VIRTUAL VISIT (OUTPATIENT)
Dept: EDUCATION SERVICES | Facility: CLINIC | Age: 63
End: 2023-10-19
Payer: COMMERCIAL

## 2023-10-19 DIAGNOSIS — E11.10 DIABETIC KETOACIDOSIS WITHOUT COMA ASSOCIATED WITH TYPE 2 DIABETES MELLITUS (H): ICD-10-CM

## 2023-10-19 DIAGNOSIS — Z79.4 TYPE 2 DIABETES MELLITUS WITHOUT COMPLICATION, WITH LONG-TERM CURRENT USE OF INSULIN (H): ICD-10-CM

## 2023-10-19 DIAGNOSIS — E11.9 TYPE 2 DIABETES MELLITUS WITHOUT COMPLICATION, WITH LONG-TERM CURRENT USE OF INSULIN (H): ICD-10-CM

## 2023-10-19 PROCEDURE — G0108 DIAB MANAGE TRN  PER INDIV: HCPCS | Mod: 93 | Performed by: DIETITIAN, REGISTERED

## 2023-10-19 NOTE — PROGRESS NOTES
Diabetes Self-Management Education & Support    Presents for: Initial Assessment for new diagnosis    Type of Service: Telephone Visit    Originating Location (Patient Location): Home  Distant Location (Provider Location): Offsite  Mode of Communication:  Telephone    Telephone Visit Start Time:  9am  Telephone Visit End Time (telephone visit stop time): 10am    How would patient like to obtain AVS? MyChart    Assessment Type:   ASSESSMENT:  Patient presents for diabetes education following hospitalization for DKA in late August.  Patient notes being told her had diabetes ~2 years ago but was in denial and did not treat it.  Diabetes is currently being managed with Lantus 25 units BID and Novolog per sliding scale.  Patient notes he rarely uses the Novolog.  Has made diet modications.  Eat less sweets and reading food labels.  Checks glucose 3x/day, pre-meal.  See bg log below.    Patient's most recent   Lab Results   Component Value Date    A1C 10.5 08/25/2023     is not meeting goal of <7.0    Diabetes knowledge and skills assessment:   Patient is knowledgeable in diabetes management concepts related to: Healthy Eating, Monitoring, and Taking Medication    Continue education with the following diabetes management concepts: Healthy Eating, Being Active, Monitoring, Taking Medication, Problem Solving, and Reducing Risks    Based on learning assessment above, most appropriate setting for further diabetes education would be: Individual setting.      PLAN    Given patient has had hypglycemia 4x this week, will reduce Lantus insulin by 20% and change sliding scale.  Patient's goal is less insulin/injections.    Recommend 40 units Lantus in the morning.  No night shot.      Recommend increase Novolog sliding scale to: 180-220 +2 units, 221-260 +3 units, 261-300 +4 units  Continue testing glucose 3x/day pre-meal  Discuss adding Metformin at future visit; this may help us reduce insulin further.  Strong likelihood of  "discontinuing Novolog completely.  Recommend carb controlled diet.  Provided education on this to patient.  Educated patient on treatment of hypoglycemia.      Topics to cover at upcoming visits: Healthy Eating, Monitoring, Taking Medication, and Problem Solving    Follow-up: 11/7    See Care Plan for co-developed, patient-state behavior change goals.  AVS provided for patient today.    Education Materials Provided:  [employer] Understanding Diabetes Booklet      SUBJECTIVE/OBJECTIVE:  Presents for: Initial Assessment for new diagnosis  Accompanied by: Self  Diabetes education in the past 24mo: No  Focus of Visit: Patient Unsure  Diabetes type: Type 2  Disease course: Improving  Diabetes management related comments/concerns: none  Other concerns:: None  Cultural Influences/Ethnic Background:  Not  or       Diabetes Symptoms & Complications:  Weight trend: Stable  Complications assessed today?: No    Patient Problem List and Family Medical History reviewed for relevant medical history, current medical status, and diabetes risk factors.    Vitals:  There were no vitals taken for this visit.  Estimated body mass index is 37.34 kg/m  as calculated from the following:    Height as of 10/2/23: 1.854 m (6' 1\").    Weight as of 10/2/23: 128.4 kg (283 lb).   Last 3 BP:   BP Readings from Last 3 Encounters:   10/02/23 (!) 146/93   08/31/23 119/83   08/29/23 124/80       History   Smoking Status    Never   Smokeless Tobacco    Never       Labs:  Lab Results   Component Value Date    A1C 10.5 08/25/2023     Lab Results   Component Value Date     08/29/2023     06/17/2022     No results found for: \"LDL\"  No results found for: \"HDL\"]  GFR Estimate   Date Value Ref Range Status   08/28/2023 >90 >60 mL/min/1.73m2 Final     GFR, ESTIMATED POCT   Date Value Ref Range Status   08/25/2023 56 (L) >60 mL/min/1.73m2 Final     No results found for: \"GFRESTBLACK\"  Lab Results   Component Value Date    CR " "0.78 08/28/2023     No results found for: \"MICROALBUMIN\"    Healthy Eating:  Healthy Eating Assessed Today: Yes  Meal planning/habits: Carb counting  Meals include: Breakfast, Lunch, Dinner  Breakfast: 2 Min Aayush sausage biscuits (44gm), hashbrown (22gm), water/diet coke OR bagel  Lunch: ham or chicken salad on 2 carb balance tortilla OR rotisserie chicken OR taco bell tacos x 2  Dinner: pancake, sausage OR chicken nuggets, 10 french fries OR beef, gravy, mashed potatoes, salad  Snacks: nuts OR 1/2 fun size candy bar OR 2 small cookies  Beverages: Water  Has patient met with a dietitian in the past?: No    Being Active:  Being Active Assessed Today: No    Monitoring:  Monitoring Assessed Today: Yes  Did patient bring glucose meter to appointment? : Yes  Times checking blood sugar at home (number): 3  Times checking blood sugar at home (per): Day (pre-meal)    10/13: 91, 65, 135  10/14: 75, 67, 113  10/15: 68, 117, 98  10/16: 95, 147, 91  10/17: 69, 148, 91  10/18: 84, 88, 103  10/19: 85    Taking Medications:  Diabetes Medication(s)       Insulin       insulin aspart (NOVOLOG PEN) 100 UNIT/ML pen    For Pre-Meal -220 give +2 units, 221-260 give +3 units, 261-300 give +4 units.     insulin glargine (LANTUS PEN) 100 UNIT/ML pen    Inject 40 Units Subcutaneous every morning     insulin aspart (NOVOLOG PEN) 100 UNIT/ML pen    1 unit for every 10 grams of CHO TID during meal time     Patient not taking: Reported on 10/19/2023            Taking Medication Assessed Today: Yes  Current Treatments: Insulin Injections  Dose schedule: Pre-breakfast, At bedtime  Given by: Patient  Problems taking diabetes medications regularly?: No  Diabetes medication side effects?: No    Problem Solving:                 Reducing Risks:       Healthy Coping:     Patient Activation Measure Survey Score:       No data to display                  Care Plan and Education Provided:  Care Plan: Diabetes   Updates made by Apurva Mars, " CHASIDY since 10/19/2023 12:00 AM        Problem: HbA1C Not In Goal         Goal: Establish Regular Follow-Ups with PCP         Task: Discuss with PCP the recommended timing for patient's next follow up visit(s)    Responsible User: Apurva Mars RD        Task: Discuss schedule for PCP visits with patient    Responsible User: Apurva Mars RD        Goal: Get HbA1C Level in Goal         Task: Educate patient on diabetes education self-management topics Completed 10/19/2023   Responsible User: Apurva Mars RD        Task: Educate patient on benefits of regular glucose monitoring    Responsible User: Apurva Mars RD        Task: Refer patient to appropriate extended care team member, as needed (Medication Therapy Management, Behavioral Health, Physical Therapy, etc.)    Responsible User: Apurva Mars RD        Task: Discuss diabetes treatment plan with patient    Responsible User: Apurva Mars RD        Problem: Diabetes Self-Management Education Needed to Optimize Self-Care Behaviors         Goal: Understand diabetes pathophysiology and disease progression         Task: Provide education on diabetes pathophysiology and disease progression specfic to patient's diabetes type Completed 10/19/2023   Responsible User: Apurva Mars RD        Goal: Healthy Eating - follow a healthy eating pattern for diabetes    This Visit's Progress: 70%   Note:    Aim for 45-60 gm carbohydrate per meal, 15-30 gm carb per snack       Task: Provide education on portion control and consistency in amount, composition and timing of food intake Completed 10/19/2023   Responsible User: Apurva Mars RD        Task: Provide education on managing carbohydrate intake (carbohydrate counting, plate planning method, etc.) Completed 10/19/2023   Responsible User: Apurva Mars RD        Task: Provide education on weight management    Responsible User: Apurva Mars RD        Task: Provide education on  heart healthy eating    Responsible User: Apurva Mars RD        Task: Provide education on eating out    Responsible User: Apurva Mars RD        Task: Develop individualized healthy eating plan with patient Completed 10/19/2023   Responsible User: Apurva Mars RD        Goal: Being Active - get regular physical activity, working up to at least 150 minutes per week         Task: Provide education on relationship of activity to glucose and precautions to take if at risk for low glucose Completed 10/19/2023   Responsible User: Apurva Mars RD        Task: Discuss barriers to physical activity with patient    Responsible User: Apurva Mars RD        Task: Develop physical activity plan with patient    Responsible User: Apurva Mars RD        Task: Explore community resources including walking groups, assistance programs, and home videos    Responsible User: Apurva Mars RD        Goal: Monitoring - monitor glucose and ketones as directed         Task: Provide education on blood glucose monitoring (purpose, proper technique, frequency, glucose targets, interpreting results, when to use glucose control solution, sharps disposal) Completed 10/19/2023   Responsible User: Apurva Mars RD        Task: Provide education on continuous glucose monitoring (sensor placement, use of sivan or /reader, understanding glucose trends, alerts and alarms, differences between sensor glucose and blood glucose)    Responsible User: Apurva Mars RD        Task: Provide education on ketone monitoring (when to monitor, frequency, etc.)    Responsible User: Apurva Mars RD        Goal: Taking Medication - patient is consistently taking medications as directed         Task: Provide education on action of prescribed medication, including when to take and possible side effects    Responsible User: Apurva Mars RD        Task: Provide education on insulin and injectable  diabetes medications, including administration, storage, site selection and rotation for injection sites Completed 10/19/2023   Responsible User: Apurva Mars RD        Task: Discuss barriers to medication adherence with patient and provide management technique ideas as appropriate    Responsible User: Apurva Mars RD        Task: Provide education on frequency and refill details of medications    Responsible User: Apurva Mars RD        Goal: Problem Solving - know how to prevent and manage short-term diabetes complications         Task: Provide education on high blood glucose - causes, signs/symptoms, prevention and treatment Completed 10/19/2023   Responsible User: Apurva Mars RD        Task: Provide education on low blood glucose - causes, signs/symptoms, prevention, treatment, carrying a carbohydrate source at all times, and medical identification Completed 10/19/2023   Responsible User: Apurva Mars RD        Task: Provide education on safe travel with diabetes    Responsible User: Apurva Mars RD        Task: Provide education on how to care for diabetes on sick days    Responsible User: Apurva Mars RD        Task: Provide education on when to call a health care provider    Responsible User: Apurva Mars RD        Goal: Reducing Risks - know how to prevent and treat long-term diabetes complications         Task: Provide education on major complications of diabetes, prevention, early diagnostic measures and treatment of complications    Responsible User: Apurva Mars RD        Task: Provide education on recommended care for dental, eye and foot health    Responsible User: Apurva Mars RD        Task: Provide education on Hemoglobin A1c - goals and relationship to blood glucose levels    Responsible User: Apurva Mars RD        Task: Provide education on recommendations for heart health - lipid levels and goals, blood pressure and goals, and  aspirin therapy, if indicated    Responsible User: Apurva Mars RD        Task: Provide education on tobacco cessation    Responsible User: Apurva Mars RD        Goal: Healthy Coping - use available resources to cope with the challenges of managing diabetes         Task: Discuss recognizing feelings about having diabetes    Responsible User: Apurva Mars RD        Task: Provide education on the benefits of making appropriate lifestyle changes    Responsible User: Apurva Mars RD        Task: Provide education on benefits of utilizing support systems    Responsible User: Apurva Mars RD        Task: Discuss methods for coping with stress    Responsible User: Apurva Mars RD        Task: Provide education on when to seek professional counseling    Responsible User: Apurva Mars RD Tami Carpenter, RDN LD [facility]      Time Spent: 60 minutes  Encounter Type: Individual    Any diabetes medication dose changes were made via the CDE Protocol per the patient's referring provider. A copy of this encounter was shared with the provider.

## 2023-10-19 NOTE — PATIENT INSTRUCTIONS
"MY DIABETES TODAY:    1)  Goal A1C is under <7.0  Mine is:      Lab Results   Component Value Date    A1C 10.5 08/25/2023       2)  Goal LDL (bad cholesterol) under 100  (measured at least yearly)- I am currently at: No results found for: \"LDL\"    3)  Goal blood pressure under 130/80- mine was Data Unavailable today    Care Plan:  Recommend 40 units Lantus in the morning.  No night shot.    Recommend change Novolog sliding scale to: 180-220 +2 units, 221-260 +3 units, 261-300 +4 units  Continue testing glucose 3x/day pre-meal  Discuss adding Metformin at future visit; this may help us reduce insulin further.  Strong likelihood of discontinuing Novolog completely.  Recommend carb controlled diet, 45-60 gm per meal.  Educated patient on treatment of hypoglycemia - glucose tablets.    Follow up:  Follow-up diabetes education appointment scheduled on 11/7.      Bring blood glucose meter and logbook with you to all doctor and follow-up appointments.     [place] [facility] for the [facility]:  For Your Diabetes or Nutrition Education Appointments Call:  [phone]   For Diabetes or Nutrition Related Questions:   [phone]  Send [email or web address] Message   If you need a medication refill please contact your pharmacy. Please allow 3 business days for your refills to be completed.   "

## 2023-10-19 NOTE — LETTER
10/19/2023         RE: Kalen Callejas  [address] [facility] [id number]        Dear Colleague,    Thank you for referring your patient, Kalen Callejas, to the [facility]. Please see a copy of my visit note below.    Diabetes Self-Management Education & Support    Presents for: Initial Assessment for new diagnosis    Type of Service: Telephone Visit    Originating Location (Patient Location): Home  Distant Location (Provider Location): Offsite  Mode of Communication:  Telephone    Telephone Visit Start Time:  9am  Telephone Visit End Time (telephone visit stop time): 10am    How would patient like to obtain AVS? MyChart    Assessment Type:   ASSESSMENT:  Patient presents for diabetes education following hospitalization for DKA in late August.  Patient notes being told her had diabetes ~2 years ago but was in denial and did not treat it.  Diabetes is currently being managed with Lantus 25 units BID and Novolog per sliding scale.  Patient notes he rarely uses the Novolog.  Has made diet modications.  Eat less sweets and reading food labels.  Checks glucose 3x/day, pre-meal.  See bg log below.    Patient's most recent   Lab Results   Component Value Date    A1C 10.5 08/25/2023     is not meeting goal of <7.0    Diabetes knowledge and skills assessment:   Patient is knowledgeable in diabetes management concepts related to: Healthy Eating, Monitoring, and Taking Medication    Continue education with the following diabetes management concepts: Healthy Eating, Being Active, Monitoring, Taking Medication, Problem Solving, and Reducing Risks    Based on learning assessment above, most appropriate setting for further diabetes education would be: Individual setting.      PLAN    Given patient has had hypglycemia 4x this week, will reduce Lantus insulin by 20% and change sliding scale.  Patient's goal is less insulin/injections.    Recommend 40 units Lantus in the morning.  No night  "shot.      Recommend increase Novolog sliding scale to: 180-220 +2 units, 221-260 +3 units, 261-300 +4 units  Continue testing glucose 3x/day pre-meal  Discuss adding Metformin at future visit; this may help us reduce insulin further.  Strong likelihood of discontinuing Novolog completely.  Recommend carb controlled diet.  Provided education on this to patient.  Educated patient on treatment of hypoglycemia.      Topics to cover at upcoming visits: Healthy Eating, Monitoring, Taking Medication, and Problem Solving    Follow-up: 11/7    See Care Plan for co-developed, patient-state behavior change goals.  AVS provided for patient today.    Education Materials Provided:  [facility] Understanding Diabetes Booklet      SUBJECTIVE/OBJECTIVE:  Presents for: Initial Assessment for new diagnosis  Accompanied by: Self  Diabetes education in the past 24mo: No  Focus of Visit: Patient Unsure  Diabetes type: Type 2  Disease course: Improving  Diabetes management related comments/concerns: none  Other concerns:: None  Cultural Influences/Ethnic Background:  Not  or       Diabetes Symptoms & Complications:  Weight trend: Stable  Complications assessed today?: No    Patient Problem List and Family Medical History reviewed for relevant medical history, current medical status, and diabetes risk factors.    Vitals:  There were no vitals taken for this visit.  Estimated body mass index is 37.34 kg/m  as calculated from the following:    Height as of 10/2/23: 1.854 m (6' 1\").    Weight as of 10/2/23: 128.4 kg (283 lb).   Last 3 BP:   BP Readings from Last 3 Encounters:   10/02/23 (!) 146/93   08/31/23 119/83   08/29/23 124/80       History   Smoking Status     Never   Smokeless Tobacco     Never       Labs:  Lab Results   Component Value Date    A1C 10.5 08/25/2023     Lab Results   Component Value Date     08/29/2023     06/17/2022     No results found for: \"LDL\"  No results found for: \"HDL\"]  GFR " "Estimate   Date Value Ref Range Status   08/28/2023 >90 >60 mL/min/1.73m2 Final     GFR, ESTIMATED POCT   Date Value Ref Range Status   08/25/2023 56 (L) >60 mL/min/1.73m2 Final     No results found for: \"GFRESTBLACK\"  Lab Results   Component Value Date    CR 0.78 08/28/2023     No results found for: \"MICROALBUMIN\"    Healthy Eating:  Healthy Eating Assessed Today: Yes  Meal planning/habits: Carb counting  Meals include: Breakfast, Lunch, Dinner  Breakfast: 2 Min Aayush sausage biscuits (44gm), hashbrown (22gm), water/diet coke OR bagel  Lunch: ham or chicken salad on 2 carb balance tortilla OR rotisserie chicken OR taco bell tacos x 2  Dinner: pancake, sausage OR chicken nuggets, 10 french fries OR beef, gravy, mashed potatoes, salad  Snacks: nuts OR 1/2 fun size candy bar OR 2 small cookies  Beverages: Water  Has patient met with a dietitian in the past?: No    Being Active:  Being Active Assessed Today: No    Monitoring:  Monitoring Assessed Today: Yes  Did patient bring glucose meter to appointment? : Yes  Times checking blood sugar at home (number): 3  Times checking blood sugar at home (per): Day (pre-meal)    10/13: 91, 65, 135  10/14: 75, 67, 113  10/15: 68, 117, 98  10/16: 95, 147, 91  10/17: 69, 148, 91  10/18: 84, 88, 103  10/19: 85    Taking Medications:  Diabetes Medication(s)       Insulin       insulin aspart (NOVOLOG PEN) 100 UNIT/ML pen    For Pre-Meal -220 give +2 units, 221-260 give +3 units, 261-300 give +4 units.     insulin glargine (LANTUS PEN) 100 UNIT/ML pen    Inject 40 Units Subcutaneous every morning     insulin aspart (NOVOLOG PEN) 100 UNIT/ML pen    1 unit for every 10 grams of CHO TID during meal time     Patient not taking: Reported on 10/19/2023            Taking Medication Assessed Today: Yes  Current Treatments: Insulin Injections  Dose schedule: Pre-breakfast, At bedtime  Given by: Patient  Problems taking diabetes medications regularly?: No  Diabetes medication side " effects?: No    Problem Solving:                 Reducing Risks:       Healthy Coping:     Patient Activation Measure Survey Score:       No data to display                  Care Plan and Education Provided:  Care Plan: Diabetes   Updates made by Apurva Mars RD since 10/19/2023 12:00 AM        Problem: HbA1C Not In Goal         Goal: Establish Regular Follow-Ups with PCP         Task: Discuss with PCP the recommended timing for patient's next follow up visit(s)    Responsible User: Apurva Mars RD        Task: Discuss schedule for PCP visits with patient    Responsible User: Apurva Mars RD        Goal: Get HbA1C Level in Goal         Task: Educate patient on diabetes education self-management topics Completed 10/19/2023   Responsible User: Apurva Mars RD        Task: Educate patient on benefits of regular glucose monitoring    Responsible User: Apurva Mars RD        Task: Refer patient to appropriate extended care team member, as needed (Medication Therapy Management, Behavioral Health, Physical Therapy, etc.)    Responsible User: Apurva Mars RD        Task: Discuss diabetes treatment plan with patient    Responsible User: Apurva Mars RD        Problem: Diabetes Self-Management Education Needed to Optimize Self-Care Behaviors         Goal: Understand diabetes pathophysiology and disease progression         Task: Provide education on diabetes pathophysiology and disease progression specfic to patient's diabetes type Completed 10/19/2023   Responsible User: Apurva Mars RD        Goal: Healthy Eating - follow a healthy eating pattern for diabetes    This Visit's Progress: 70%   Note:    Aim for 45-60 gm carbohydrate per meal, 15-30 gm carb per snack       Task: Provide education on portion control and consistency in amount, composition and timing of food intake Completed 10/19/2023   Responsible User: Apurva Mars RD        Task: Provide education on managing  carbohydrate intake (carbohydrate counting, plate planning method, etc.) Completed 10/19/2023   Responsible User: Apurva Mars RD        Task: Provide education on weight management    Responsible User: Apurva Mars RD        Task: Provide education on heart healthy eating    Responsible User: Apurva Mars RD        Task: Provide education on eating out    Responsible User: Apurva Mars RD        Task: Develop individualized healthy eating plan with patient Completed 10/19/2023   Responsible User: Apurva Mars RD        Goal: Being Active - get regular physical activity, working up to at least 150 minutes per week         Task: Provide education on relationship of activity to glucose and precautions to take if at risk for low glucose Completed 10/19/2023   Responsible User: Apurva Mars RD        Task: Discuss barriers to physical activity with patient    Responsible User: Apurva Mars RD        Task: Develop physical activity plan with patient    Responsible User: Apurva Mars RD        Task: Explore community resources including walking groups, assistance programs, and home videos    Responsible User: Apurva Mars RD        Goal: Monitoring - monitor glucose and ketones as directed         Task: Provide education on blood glucose monitoring (purpose, proper technique, frequency, glucose targets, interpreting results, when to use glucose control solution, sharps disposal) Completed 10/19/2023   Responsible User: Apurva Mars RD        Task: Provide education on continuous glucose monitoring (sensor placement, use of sivan or /reader, understanding glucose trends, alerts and alarms, differences between sensor glucose and blood glucose)    Responsible User: Apurva Mars RD        Task: Provide education on ketone monitoring (when to monitor, frequency, etc.)    Responsible User: Apurva Mars RD        Goal: Taking Medication - patient is  consistently taking medications as directed         Task: Provide education on action of prescribed medication, including when to take and possible side effects    Responsible User: Apurva Mars RD        Task: Provide education on insulin and injectable diabetes medications, including administration, storage, site selection and rotation for injection sites Completed 10/19/2023   Responsible User: Apurva Mars RD        Task: Discuss barriers to medication adherence with patient and provide management technique ideas as appropriate    Responsible User: Apurva Mars RD        Task: Provide education on frequency and refill details of medications    Responsible User: Apurva Mars RD        Goal: Problem Solving - know how to prevent and manage short-term diabetes complications         Task: Provide education on high blood glucose - causes, signs/symptoms, prevention and treatment Completed 10/19/2023   Responsible User: Apurva Mars RD        Task: Provide education on low blood glucose - causes, signs/symptoms, prevention, treatment, carrying a carbohydrate source at all times, and medical identification Completed 10/19/2023   Responsible User: Apurva Mars RD        Task: Provide education on safe travel with diabetes    Responsible User: Apurva Mars RD        Task: Provide education on how to care for diabetes on sick days    Responsible User: Apurva Mars RD        Task: Provide education on when to call a health care provider    Responsible User: Apurva Mars RD        Goal: Reducing Risks - know how to prevent and treat long-term diabetes complications         Task: Provide education on major complications of diabetes, prevention, early diagnostic measures and treatment of complications    Responsible User: Apurva Mars RD        Task: Provide education on recommended care for dental, eye and foot health    Responsible User: Apurva Mars RD         Task: Provide education on Hemoglobin A1c - goals and relationship to blood glucose levels    Responsible User: Apurva Mars RD        Task: Provide education on recommendations for heart health - lipid levels and goals, blood pressure and goals, and aspirin therapy, if indicated    Responsible User: Apurva Mars RD        Task: Provide education on tobacco cessation    Responsible User: Apurva Mars RD        Goal: Healthy Coping - use available resources to cope with the challenges of managing diabetes         Task: Discuss recognizing feelings about having diabetes    Responsible User: Apurva Mars RD        Task: Provide education on the benefits of making appropriate lifestyle changes    Responsible User: Apurva Mars RD        Task: Provide education on benefits of utilizing support systems    Responsible User: Apurva Mars RD        Task: Discuss methods for coping with stress    Responsible User: Apurva Mars RD        Task: Provide education on when to seek professional counseling    Responsible User: Apurva Mars RD Tami Carpenter, RDN [facility]      Time Spent: 60 minutes  Encounter Type: Individual    Any diabetes medication dose changes were made via the CDE Protocol per the patient's referring provider. A copy of this encounter was shared with the provider.

## 2023-11-04 ENCOUNTER — HEALTH MAINTENANCE LETTER (OUTPATIENT)
Age: 63
End: 2023-11-04

## 2023-11-06 ENCOUNTER — TRANSFERRED RECORDS (OUTPATIENT)
Dept: MULTI SPECIALTY CLINIC | Facility: CLINIC | Age: 63
End: 2023-11-06

## 2023-11-06 LAB — RETINOPATHY: NORMAL

## 2023-11-07 ENCOUNTER — VIRTUAL VISIT (OUTPATIENT)
Dept: EDUCATION SERVICES | Facility: CLINIC | Age: 63
End: 2023-11-07
Payer: COMMERCIAL

## 2023-11-07 DIAGNOSIS — Z79.4 TYPE 2 DIABETES MELLITUS WITHOUT COMPLICATION, WITH LONG-TERM CURRENT USE OF INSULIN (H): Primary | ICD-10-CM

## 2023-11-07 DIAGNOSIS — E11.9 TYPE 2 DIABETES MELLITUS WITHOUT COMPLICATION, WITH LONG-TERM CURRENT USE OF INSULIN (H): Primary | ICD-10-CM

## 2023-11-07 DIAGNOSIS — E11.10 DIABETIC KETOACIDOSIS WITHOUT COMA ASSOCIATED WITH TYPE 2 DIABETES MELLITUS (H): ICD-10-CM

## 2023-11-07 PROCEDURE — G0108 DIAB MANAGE TRN  PER INDIV: HCPCS | Mod: 93 | Performed by: DIETITIAN, REGISTERED

## 2023-11-07 RX ORDER — METFORMIN HCL 500 MG
1000 TABLET, EXTENDED RELEASE 24 HR ORAL 2 TIMES DAILY WITH MEALS
Qty: 360 TABLET | Refills: 1 | Status: SHIPPED | OUTPATIENT
Start: 2023-11-07 | End: 2024-02-12

## 2023-11-07 NOTE — PROGRESS NOTES
Diabetes Self-Management Education & Support    Presents for: Follow-up    Type of Service: Telephone Visit    Originating Location (Patient Location): Home  Distant Location (Provider Location): Offsite  Mode of Communication:  Telephone    Telephone Visit Start Time:  11:00am  Telephone Visit End Time (telephone visit stop time): 11:30    How would patient like to obtain AVS? MyChart      ASSESSMENT:  Patient presents for Diabetes Education follow-up.  Previous appt was 10/19 and insulin was decreased.  Patient with no specific questions or concerns today.  Glucose log was reviewed for the past week - some mild hypoglycemia noted.  No hyperglycemia.  See log below.  Patient reports taking 40 units Lantus every morning.  No Novolog.  Patient is following a carb controlled diet.  Lab appointment upcoming on 11/20.      Patient's most recent   Lab Results   Component Value Date    A1C 10.5 08/25/2023     is not meeting goal of <7.0    Diabetes knowledge and skills assessment:   Patient is knowledgeable in diabetes management concepts related to: Healthy Eating, Being Active, Monitoring, Taking Medication, and Problem Solving    Continue education with the following diabetes management concepts: Healthy Eating, Taking Medication, and Problem Solving    Based on learning assessment above, most appropriate setting for further diabetes education would be: Individual setting.      PLAN    Reduce Lantus to 32 units in the morning.  Initiate Metformin.  Start with 1 500mg tablet with dinner.  After one week, increase to 2 tablets daily (1 with breakfast, 1 with dinner).  Continue to titrate up to 2000mg daily (2 with breakfast, 2 with dinner).  Anticipate further insulin dose decreases with the addition of Metformin.  Follow-up scheduled in 2 weeks to review glucose log.  Continue testing glucose 2-3 times daily.  Continue carb controlled diet.    Topics to cover at upcoming visits: Healthy Eating, Taking Medication, and  "Problem Solving    Follow-up: 11/22    See Care Plan for co-developed, patient-state behavior change goals.  AVS provided for patient today.    Education Materials Provided:  No new materials provided today      SUBJECTIVE/OBJECTIVE:  Presents for: Follow-up  Accompanied by: Self  Diabetes education in the past 24mo: Yes  Focus of Visit: Patient Unsure  Diabetes type: Type 2  Disease course: Improving  How confident are you filling out medical forms by yourself:: Extremely  Other concerns:: None  Cultural Influences/Ethnic Background:  Not  or       Diabetes Symptoms & Complications:  Fatigue: No  Neuropathy: No  Polydipsia: No  Polyphagia: No  Polyuria: No  Visual change: No  Slow healing wounds: No  Complications assessed today?: No  Autonomic neuropathy: No  CVA: No  Heart disease: No  Nephropathy: No  Peripheral neuropathy: No  Peripheral Vascular Disease: No  Retinopathy: No  Sexual dysfunction: No    Patient Problem List and Family Medical History reviewed for relevant medical history, current medical status, and diabetes risk factors.    Vitals:  There were no vitals taken for this visit.  Estimated body mass index is 37.34 kg/m  as calculated from the following:    Height as of 10/2/23: 1.854 m (6' 1\").    Weight as of 10/2/23: 128.4 kg (283 lb).   Last 3 BP:   BP Readings from Last 3 Encounters:   10/02/23 (!) 146/93   08/31/23 119/83   08/29/23 124/80       History   Smoking Status    Never   Smokeless Tobacco    Never       Labs:  Lab Results   Component Value Date    A1C 10.5 08/25/2023     Lab Results   Component Value Date     08/29/2023     06/17/2022     No results found for: \"LDL\"  No results found for: \"HDL\"]  GFR Estimate   Date Value Ref Range Status   08/28/2023 >90 >60 mL/min/1.73m2 Final     GFR, ESTIMATED POCT   Date Value Ref Range Status   08/25/2023 56 (L) >60 mL/min/1.73m2 Final     No results found for: \"GFRESTBLACK\"  Lab Results   Component Value Date    CR " "0.78 08/28/2023     No results found for: \"MICROALBUMIN\"    Healthy Eating:  Healthy Eating Assessed Today: Yes  Cultural/[employer] diet restrictions?: No  Meal planning/habits: Carb counting  How many times a week on average do you eat food made away from home (restaurant/take-out)?: 2  Meals include: Lunch, Breakfast, Dinner  Lunch: sometimes missing lunch  Beverages: Water, Diet soda  Has patient met with a dietitian in the past?: Yes    Being Active:  Being Active Assessed Today: No  Days per week of moderate to strenuous exercise (like a brisk walk): 3  On average, minutes per day of exercise at this level: 60  How intense was your typical exercise? : Moderate (like brisk walking)  Exercise Minutes per Week: 180  Barrier to exercise: None    Monitoring:  Monitoring Assessed Today: Yes  Did patient bring glucose meter to appointment? : Yes  Blood Glucose Meter: Accu-chek  Times checking blood sugar at home (number): 3  Times checking blood sugar at home (per): Day  Blood glucose trend: No change    Date Breakfast Lunch Dinner    Before Before Before   11/1 76  101   11/2 81 86 84   11/3 80  143 (3 hours after Big Mac)   11/4 114 122 71   11/5 70  102   11/7 67          Taking Medications:  Diabetes Medication(s)       Insulin       insulin aspart (NOVOLOG PEN) 100 UNIT/ML pen    For Pre-Meal -220 give +2 units, 221-260 give +3 units, 261-300 give +4 units.     insulin aspart (NOVOLOG PEN) 100 UNIT/ML pen    1 unit for every 10 grams of CHO TID during meal time     Patient not taking: Reported on 10/19/2023     insulin glargine (LANTUS PEN) 100 UNIT/ML pen    Inject 40 Units Subcutaneous every morning            Taking Medication Assessed Today: Yes  Current Treatments: Insulin Injections  Dose schedule: Pre-breakfast  Given by: Patient  Injection/Infusion sites: Abdomen  Problems taking diabetes medications regularly?: No    Problem Solving:  Problem Solving Assessed Today: Yes  Is the patient at risk for " hypoglycemia?: Yes  Hypoglycemia Frequency: Weekly  Is the patient at risk for DKA?: No    Hypoglycemia symptoms  Feeling shaky: Yes         Reducing Risks:  Reducing Risks Assessed Today: No  CAD Risks: Diabetes Mellitus, Hypertension  Has dilated eye exam at least once a year?: No  Sees dentist every 6 months?: No  Feet checked by healthcare provider in the last year?: Yes    Healthy Coping:  Healthy Coping Assessed Today: Yes  Emotional response to diabetes: Ready to learn  Informal Support system:: Family  Stage of change: ACTION (Actively working towards change)  Support resources: None  Patient Activation Measure Survey Score:       No data to display                  Care Plan and Education Provided:  Care Plan: Diabetes   Updates made by Apurva Mars RD since 11/7/2023 12:00 AM        Problem: Diabetes Self-Management Education Needed to Optimize Self-Care Behaviors         Goal: Taking Medication - patient is consistently taking medications as directed         Task: Provide education on action of prescribed medication, including when to take and possible side effects Completed 11/7/2023   Responsible User: Apurva Mars RD        Goal: Reducing Risks - know how to prevent and treat long-term diabetes complications         Task: Provide education on Hemoglobin A1c - goals and relationship to blood glucose levels Completed 11/7/2023   Responsible User: Apurva Mars RD Tami Carpenter, RDN [facility]      Time Spent: 30 minutes  Encounter Type: Individual    Any diabetes medication dose changes were made via the CDE Protocol per the patient's referring provider. A copy of this encounter was shared with the provider.

## 2023-11-07 NOTE — LETTER
11/7/2023         RE: Kalen Callejas  [address] [facility] [address]        Dear Colleague,    Thank you for referring your patient, Kalen Callejas, to the [facility]. Please see a copy of my visit note below.    Diabetes Self-Management Education & Support    Presents for: Follow-up    Type of Service: Telephone Visit    Originating Location (Patient Location): Home  Distant Location (Provider Location): Offsite  Mode of Communication:  Telephone    Telephone Visit Start Time:  11:00am  Telephone Visit End Time (telephone visit stop time): 11:30    How would patient like to obtain AVS? MyChart      ASSESSMENT:  Patient presents for Diabetes Education follow-up.  Previous appt was 10/19 and insulin was decreased.  Patient with no specific questions or concerns today.  Glucose log was reviewed for the past week - some mild hypoglycemia noted.  No hyperglycemia.  See log below.  Patient reports taking 40 units Lantus every morning.  No Novolog.  Patient is following a carb controlled diet.  Lab appointment upcoming on 11/20.      Patient's most recent   Lab Results   Component Value Date    A1C 10.5 08/25/2023     is not meeting goal of <7.0    Diabetes knowledge and skills assessment:   Patient is knowledgeable in diabetes management concepts related to: Healthy Eating, Being Active, Monitoring, Taking Medication, and Problem Solving    Continue education with the following diabetes management concepts: Healthy Eating, Taking Medication, and Problem Solving    Based on learning assessment above, most appropriate setting for further diabetes education would be: Individual setting.      PLAN    Reduce Lantus to 32 units in the morning.  Initiate Metformin.  Start with 1 500mg tablet with dinner.  After one week, increase to 2 tablets daily (1 with breakfast, 1 with dinner).  Continue to titrate up to 2000mg daily (2 with breakfast, 2 with dinner).  Anticipate further  "insulin dose decreases with the addition of Metformin.  Follow-up scheduled in 2 weeks to review glucose log.  Continue testing glucose 2-3 times daily.  Continue carb controlled diet.    Topics to cover at upcoming visits: Healthy Eating, Taking Medication, and Problem Solving    Follow-up: 11/22    See Care Plan for co-developed, patient-state behavior change goals.  AVS provided for patient today.    Education Materials Provided:  No new materials provided today      SUBJECTIVE/OBJECTIVE:  Presents for: Follow-up  Accompanied by: Self  Diabetes education in the past 24mo: Yes  Focus of Visit: Patient Unsure  Diabetes type: Type 2  Disease course: Improving  How confident are you filling out medical forms by yourself:: Extremely  Other concerns:: None  Cultural Influences/Ethnic Background:  Not  or       Diabetes Symptoms & Complications:  Fatigue: No  Neuropathy: No  Polydipsia: No  Polyphagia: No  Polyuria: No  Visual change: No  Slow healing wounds: No  Complications assessed today?: No  Autonomic neuropathy: No  CVA: No  Heart disease: No  Nephropathy: No  Peripheral neuropathy: No  Peripheral Vascular Disease: No  Retinopathy: No  Sexual dysfunction: No    Patient Problem List and Family Medical History reviewed for relevant medical history, current medical status, and diabetes risk factors.    Vitals:  There were no vitals taken for this visit.  Estimated body mass index is 37.34 kg/m  as calculated from the following:    Height as of 10/2/23: 1.854 m (6' 1\").    Weight as of 10/2/23: 128.4 kg (283 lb).   Last 3 BP:   BP Readings from Last 3 Encounters:   10/02/23 (!) 146/93   08/31/23 119/83   08/29/23 124/80       History   Smoking Status     Never   Smokeless Tobacco     Never       Labs:  Lab Results   Component Value Date    A1C 10.5 08/25/2023     Lab Results   Component Value Date     08/29/2023     06/17/2022     No results found for: \"LDL\"  No results found for: " "\"HDL\"]  GFR Estimate   Date Value Ref Range Status   08/28/2023 >90 >60 mL/min/1.73m2 Final     GFR, ESTIMATED POCT   Date Value Ref Range Status   08/25/2023 56 (L) >60 mL/min/1.73m2 Final     No results found for: \"GFRESTBLACK\"  Lab Results   Component Value Date    CR 0.78 08/28/2023     No results found for: \"MICROALBUMIN\"    Healthy Eating:  Healthy Eating Assessed Today: Yes  Cultural/Mormonism diet restrictions?: No  Meal planning/habits: Carb counting  How many times a week on average do you eat food made away from home (restaurant/take-out)?: 2  Meals include: Lunch, Breakfast, Dinner  Lunch: sometimes missing lunch  Beverages: Water, Diet soda  Has patient met with a dietitian in the past?: Yes    Being Active:  Being Active Assessed Today: No  Days per week of moderate to strenuous exercise (like a brisk walk): 3  On average, minutes per day of exercise at this level: 60  How intense was your typical exercise? : Moderate (like brisk walking)  Exercise Minutes per Week: 180  Barrier to exercise: None    Monitoring:  Monitoring Assessed Today: Yes  Did patient bring glucose meter to appointment? : Yes  Blood Glucose Meter: Accu-chek  Times checking blood sugar at home (number): 3  Times checking blood sugar at home (per): Day  Blood glucose trend: No change    Date Breakfast Lunch Dinner    Before Before Before   11/1 76  101   11/2 81 86 84   11/3 80  143 (3 hours after Big Mac)   11/4 114 122 71   11/5 70  102   11/7 67          Taking Medications:  Diabetes Medication(s)       Insulin       insulin aspart (NOVOLOG PEN) 100 UNIT/ML pen    For Pre-Meal -220 give +2 units, 221-260 give +3 units, 261-300 give +4 units.     insulin aspart (NOVOLOG PEN) 100 UNIT/ML pen    1 unit for every 10 grams of CHO TID during meal time     Patient not taking: Reported on 10/19/2023     insulin glargine (LANTUS PEN) 100 UNIT/ML pen    Inject 40 Units Subcutaneous every morning            Taking Medication Assessed " Today: Yes  Current Treatments: Insulin Injections  Dose schedule: Pre-breakfast  Given by: Patient  Injection/Infusion sites: Abdomen  Problems taking diabetes medications regularly?: No    Problem Solving:  Problem Solving Assessed Today: Yes  Is the patient at risk for hypoglycemia?: Yes  Hypoglycemia Frequency: Weekly  Is the patient at risk for DKA?: No    Hypoglycemia symptoms  Feeling shaky: Yes         Reducing Risks:  Reducing Risks Assessed Today: No  CAD Risks: Diabetes Mellitus, Hypertension  Has dilated eye exam at least once a year?: No  Sees dentist every 6 months?: No  Feet checked by healthcare provider in the last year?: Yes    Healthy Coping:  Healthy Coping Assessed Today: Yes  Emotional response to diabetes: Ready to learn  Informal Support system:: Family  Stage of change: ACTION (Actively working towards change)  Support resources: None  Patient Activation Measure Survey Score:       No data to display                  Care Plan and Education Provided:  Care Plan: Diabetes   Updates made by Apurva Mars RD since 11/7/2023 12:00 AM        Problem: Diabetes Self-Management Education Needed to Optimize Self-Care Behaviors         Goal: Taking Medication - patient is consistently taking medications as directed         Task: Provide education on action of prescribed medication, including when to take and possible side effects Completed 11/7/2023   Responsible User: Apurva Mars RD        Goal: Reducing Risks - know how to prevent and treat long-term diabetes complications         Task: Provide education on Hemoglobin A1c - goals and relationship to blood glucose levels Completed 11/7/2023   Responsible User: Apurva Mars RD Tami Carpenter, RDN LD CDCES      Time Spent: 30 minutes  Encounter Type: Individual    Any diabetes medication dose changes were made via the CDE Protocol per the patient's referring provider. A copy of this encounter was shared with the provider.

## 2023-11-20 ENCOUNTER — LAB (OUTPATIENT)
Dept: LAB | Facility: CLINIC | Age: 63
End: 2023-11-20
Payer: COMMERCIAL

## 2023-11-20 DIAGNOSIS — E11.10 DIABETIC KETOACIDOSIS WITHOUT COMA ASSOCIATED WITH TYPE 2 DIABETES MELLITUS (H): ICD-10-CM

## 2023-11-20 DIAGNOSIS — Z79.4 TYPE 2 DIABETES MELLITUS WITHOUT COMPLICATION, WITH LONG-TERM CURRENT USE OF INSULIN (H): ICD-10-CM

## 2023-11-20 DIAGNOSIS — I10 BENIGN ESSENTIAL HYPERTENSION: ICD-10-CM

## 2023-11-20 DIAGNOSIS — E11.9 TYPE 2 DIABETES MELLITUS WITHOUT COMPLICATION, WITH LONG-TERM CURRENT USE OF INSULIN (H): ICD-10-CM

## 2023-11-20 LAB
ANION GAP SERPL CALCULATED.3IONS-SCNC: 9 MMOL/L (ref 7–15)
BUN SERPL-MCNC: 14.1 MG/DL (ref 8–23)
CALCIUM SERPL-MCNC: 9.6 MG/DL (ref 8.8–10.2)
CHLORIDE SERPL-SCNC: 105 MMOL/L (ref 98–107)
CHOLEST SERPL-MCNC: 185 MG/DL
CREAT SERPL-MCNC: 0.88 MG/DL (ref 0.67–1.17)
CREAT UR-MCNC: 23.6 MG/DL
DEPRECATED HCO3 PLAS-SCNC: 25 MMOL/L (ref 22–29)
EGFRCR SERPLBLD CKD-EPI 2021: >90 ML/MIN/1.73M2
GLUCOSE SERPL-MCNC: 91 MG/DL (ref 70–99)
HBA1C MFR BLD: 5.5 % (ref 0–5.6)
HDLC SERPL-MCNC: 44 MG/DL
LDLC SERPL CALC-MCNC: 124 MG/DL
MICROALBUMIN UR-MCNC: <12 MG/L
MICROALBUMIN/CREAT UR: NORMAL MG/G{CREAT}
NONHDLC SERPL-MCNC: 141 MG/DL
POTASSIUM SERPL-SCNC: 4.3 MMOL/L (ref 3.4–5.3)
SODIUM SERPL-SCNC: 139 MMOL/L (ref 135–145)
TRIGL SERPL-MCNC: 83 MG/DL

## 2023-11-20 PROCEDURE — 80048 BASIC METABOLIC PNL TOTAL CA: CPT

## 2023-11-20 PROCEDURE — 82570 ASSAY OF URINE CREATININE: CPT

## 2023-11-20 PROCEDURE — 82043 UR ALBUMIN QUANTITATIVE: CPT

## 2023-11-20 PROCEDURE — 36415 COLL VENOUS BLD VENIPUNCTURE: CPT

## 2023-11-20 PROCEDURE — 80061 LIPID PANEL: CPT

## 2023-11-20 PROCEDURE — 83036 HEMOGLOBIN GLYCOSYLATED A1C: CPT

## 2023-11-27 ENCOUNTER — TELEPHONE (OUTPATIENT)
Dept: INTERNAL MEDICINE | Facility: CLINIC | Age: 63
End: 2023-11-27

## 2023-11-27 NOTE — COMMUNITY RESOURCES LIST (ENGLISH)
11/27/2023   [facility] [employer]  N/A  For questions about this resource list or additional care needs, please contact your primary care clinic or care manager.  Phone: [phone]   Email: N/A   Address: [address] [place] [address]   Hours: N/A        Financial Stability       Rent and mortgage payment assistance  1  [address] [place] [address] Distance: 1.77 miles      In-Person, Phone/Virtual   [address] [place] [address]  Language: English, Faroese  Hours: Mon - Thu 9:00 AM - 4:00 PM  Fees: Free   Phone: (169) 956-2342 Email: info@[employer] Website: https://[employer]/     2  [address] [place] [address] - Rent payment assistance Distance: 6.2 miles      In-Person, Phone/Virtual   [address] [place] [address]  Language: English  Hours: Mon 8:00 AM - 4:00 PM , Tue 8:00 AM - 7:00 PM , Wed - Thu 8:00 AM - 4:00 PM  Fees: Free   Phone: (855) 149-6890 Email: info@[employer] Website: https://[employer]/resources/resource-centers/          Important Numbers & Websites       Emergency Services   911  City Services   311  Poison Control   (428) 491-8319  Suicide Prevention Lifeline   (457) 616-8840 (TALK)  Child Abuse Hotline   (354) 580-2247 (4-A-Child)  Sexual Assault Hotline   (723) 557-5319 (HOPE)  National Runaway Safeline   (214) 364-4511 (RUNAWAY)  All-Options Talkline   (925) 131-1250  Substance Abuse Referral   (946) 424-3013 (HELP)

## 2023-11-27 NOTE — TELEPHONE ENCOUNTER
"Patient called, asking if he can be seen earlier than January to check his blood pressure and get an RSV shot. Virtual visit scheduled on Thursday 11/30/23 was changed to an \"in-person\" visit. Approved by the provider according to JOSEE Langston.     Appointments in Next Year      Nov 30, 2023  3:30 PM  (Arrive by 3:10 PM)  Provider Visit with REVA Waldron CNP  [facility] ([facility] ) [phone]     "

## 2023-11-27 NOTE — COMMUNITY RESOURCES LIST (ENGLISH)
11/27/2023   [facility] [employer]  N/A  For questions about this resource list or additional care needs, please contact your primary care clinic or care manager.  Phone: [phone]   Email: N/A   Address: [address] [place] [address]   Hours: N/A        Financial Stability       Rent and mortgage payment assistance  1  [address] [place] [address] Distance: 1.77 miles      In-Person, Phone/Virtual   [address] [place] [address]  Language: English, Georgian  Hours: Mon - Thu 9:00 AM - 4:00 PM  Fees: Free   Phone: (907) 129-5970 Email: info@[employer] Website: https://[employer]/     2  [address] [place] [address] - Rent payment assistance Distance: 6.2 miles      In-Person, Phone/Virtual   [address] [place] [address]  Language: English  Hours: Mon 8:00 AM - 4:00 PM , Tue 8:00 AM - 7:00 PM , Wed - Thu 8:00 AM - 4:00 PM  Fees: Free   Phone: (647) 439-7795 Email: info@[employer] Website: https://[employer]/resources/resource-centers/          Important Numbers & Websites       Emergency Services   911  City Services   311  Poison Control   (531) 105-8500  Suicide Prevention Lifeline   (278) 702-6879 (TALK)  Child Abuse Hotline   (539) 508-9437 (4-A-Child)  Sexual Assault Hotline   (246) 762-6939 (HOPE)  National Runaway Safeline   (528) 633-7629 (RUNAWAY)  All-Options Talkline   (709) 742-4172  Substance Abuse Referral   (302) 469-5510 (HELP)

## 2023-11-30 ENCOUNTER — VIRTUAL VISIT (OUTPATIENT)
Dept: INTERNAL MEDICINE | Facility: CLINIC | Age: 63
End: 2023-11-30
Payer: COMMERCIAL

## 2023-11-30 DIAGNOSIS — Z79.4 TYPE 2 DIABETES MELLITUS WITHOUT COMPLICATION, WITH LONG-TERM CURRENT USE OF INSULIN (H): Primary | ICD-10-CM

## 2023-11-30 DIAGNOSIS — E78.5 HYPERLIPIDEMIA LDL GOAL <100: ICD-10-CM

## 2023-11-30 DIAGNOSIS — S22.081A T12 BURST FRACTURE (H): ICD-10-CM

## 2023-11-30 DIAGNOSIS — E11.9 TYPE 2 DIABETES MELLITUS WITHOUT COMPLICATION, WITH LONG-TERM CURRENT USE OF INSULIN (H): Primary | ICD-10-CM

## 2023-11-30 DIAGNOSIS — Z23 NEED FOR PNEUMOCOCCAL VACCINATION: ICD-10-CM

## 2023-11-30 DIAGNOSIS — I10 BENIGN ESSENTIAL HYPERTENSION: ICD-10-CM

## 2023-11-30 DIAGNOSIS — Z29.11 NEED FOR RSV IMMUNIZATION: ICD-10-CM

## 2023-11-30 PROCEDURE — 90471 IMMUNIZATION ADMIN: CPT

## 2023-11-30 PROCEDURE — 90677 PCV20 VACCINE IM: CPT

## 2023-11-30 PROCEDURE — 90678 RSV VACC PREF BIVALENT IM: CPT

## 2023-11-30 PROCEDURE — 99213 OFFICE O/P EST LOW 20 MIN: CPT | Mod: 25

## 2023-11-30 PROCEDURE — 90472 IMMUNIZATION ADMIN EACH ADD: CPT

## 2023-11-30 PROCEDURE — 99207 PR FOOT EXAM NO CHARGE: CPT

## 2023-11-30 RX ORDER — ATORVASTATIN CALCIUM 10 MG/1
10 TABLET, FILM COATED ORAL DAILY
Qty: 90 TABLET | Refills: 1 | Status: SHIPPED | OUTPATIENT
Start: 2023-11-30 | End: 2024-03-04

## 2023-11-30 RX ORDER — LOSARTAN POTASSIUM 50 MG/1
50 TABLET ORAL DAILY
Qty: 90 TABLET | Refills: 1 | Status: SHIPPED | OUTPATIENT
Start: 2023-11-30 | End: 2024-02-12

## 2023-11-30 NOTE — COMMUNITY RESOURCES LIST (ENGLISH)
11/30/2023   [facility] [employer]  N/A  For questions about this resource list or additional care needs, please contact your primary care clinic or care manager.  Phone: [phone]   Email: N/A   Address: [address] [place] [address]   Hours: N/A        Financial Stability       Rent and mortgage payment assistance  1  [address] [place] [address] Distance: 1.77 miles      In-Person, Phone/Virtual   [address] [place] [address]  Language: English  Hours: Mon - Thu 9:00 AM - 4:00 PM  Fees: Free   Phone: (423) 705-2876 Email: info@[employer] Website: https://[employer]/     2  [address] [place] [address] Distance: 6.2 miles      In-Person, Phone/Virtual   [address] [place] [address]  Language: English  Hours: Mon 8:00 AM - 4:00 PM , Tue 8:00 AM - 7:00 PM , Wed - Thu 8:00 AM - 4:00 PM  Fees: Free   Phone: (736) 766-6992 Email: info@[employer] Website: https://[employer]/resources/resource-centers/          Important Numbers & Websites       Emergency Services   911  [facility] Services   311  Poison Control   (981) 505-9548  Suicide Prevention Lifeline   (506) 226-6530 (TALK)  Child Abuse Hotline   (878) 697-1775 (4-A-Child)  Sexual Assault Hotline   (596) 781-7744 (HOPE)  National Runaway Safeline   (505) 889-5231 (RUNAWAY)  All-Options Talkline   (613) 823-1926  Substance Abuse Referral   (341) 729-3614 (HELP)

## 2023-11-30 NOTE — NURSING NOTE
"Weight =274#  Kellee =72.25\"  O2=99  Pulse =120  Respirations=16  Temp=97.5     Patient was seen in clinic today .    AISLINN Moody LPN    "

## 2023-11-30 NOTE — PROGRESS NOTES
"  Assessment & Plan     BP: 128/81  Wt: 274lbs  Ht: 72\"      (E11.9,  Z79.4) Type 2 diabetes mellitus without complication, with long-term current use of insulin (H)  (primary encounter diagnosis)  Comment: Here for diabetes follow up: A1C on 11/20/23 was 5.5%!! It was 10.5% on 8/25/23.  He was having hypoglycemic episodes in the morning- so Lantus was decreased from 32 to 27 units on 11/22/23. Since decreasing to 27 units a week ago, he has only had one episode of low blood sugar with a reading of 72. Fasting BG readings well controlled often around 100. Rarely around 140 post prandial.  Prior to insulin decrease, BG was around 80 fasting. I will continue with Lantus 27 units for now and he will follow up with diabetes educator in 1 month as scheduled. He is doing fabulous and said he really likes working with diabetes education. We discussed if he continues to have multiple hypoglycemic episodes <70, we could always decrease Lantus further if needed.  Plan: FOOT EXAM            (E78.5) Hyperlipidemia LDL goal <100  Comment: LDL of 124. He is willing to start Lipitor 10MG. Will recheck LDL in 3-4 months  Plan: atorvastatin (LIPITOR) 10 MG tablet, Lipid         panel reflex to direct LDL Fasting            (Z29.11) Need for RSV immunization  Comment:   Plan: RSV VACCINE (ABRYSVO)            (Z23) Need for pneumococcal vaccination  Comment:   Plan: PNEUMOCOCCAL 20 VALENT CONJUGATE (PREVNAR 20)            (I10) Benign essential hypertension  Comment: BP well controlled today at 128/81. Continue Losartan 50MG  Plan: losartan (COZAAR) 50 MG tablet            (S22.081A) T12 burst fracture (H)  Comment:   He had T12 burst fracture in December of 2022- he was standing up cutting watermelon and choked and he fell backwards against the counter and developed a t12 burst fracture. We discussed he should have DXA scan done to assess for osteoporosis - he is willing to pursue this at next OV.   Plan:                [place], " REVA WATTS [facility] [place]                          Subjective   Bill is a 63 year old, presenting for the following health issues:  No chief complaint on file.      History of Present Illness       Diabetes:   He presents for follow up of diabetes.  He is checking home blood glucose three times daily.   He checks blood glucose before meals.  Blood glucose is never over 200 and never under 70. He is aware of hypoglycemia symptoms including none.    He has no concerns regarding his diabetes at this time.   He is not experiencing numbness or burning in feet, excessive thirst, blurry vision, weight changes or redness, sores or blisters on feet. The patient has not had a diabetic eye exam in the last 12 months.          He eats 0-1 servings of fruits and vegetables daily.He consumes 0 sweetened beverage(s) daily.He exercises with enough effort to increase his heart rate 30 to 60 minutes per day.  He exercises with enough effort to increase his heart rate 6 days per week.   He is taking medications regularly.         Follow up             Objective    There were no vitals taken for this visit.  There is no height or weight on file to calculate BMI.      Physical Exam  Constitutional:       General: He is not in acute distress.     Appearance: Normal appearance. He is not ill-appearing, toxic-appearing or diaphoretic.   HENT:      Head: Normocephalic and atraumatic.   Eyes:      Conjunctiva/sclera: Conjunctivae normal.   Cardiovascular:      Rate and Rhythm: Normal rate and regular rhythm.      Heart sounds: Normal heart sounds.   Pulmonary:      Effort: Pulmonary effort is normal.      Breath sounds: Normal breath sounds.   Skin:     General: Skin is warm and dry.   Neurological:      Mental Status: He is alert and oriented to person, place, and time.   Psychiatric:         Mood and Affect: Mood normal.         Behavior: Behavior normal.         Thought Content: Thought content normal.          Judgment: Judgment normal.         Diabetic foot exam: normal DP and PT pulses, no trophic changes or ulcerative lesions, normal sensory exam, dry cracking heels, and onychomycosis

## 2023-12-11 DIAGNOSIS — E11.10 DIABETIC KETOACIDOSIS WITHOUT COMA ASSOCIATED WITH TYPE 2 DIABETES MELLITUS (H): ICD-10-CM

## 2023-12-20 ENCOUNTER — VIRTUAL VISIT (OUTPATIENT)
Dept: EDUCATION SERVICES | Facility: CLINIC | Age: 63
End: 2023-12-20
Payer: COMMERCIAL

## 2023-12-20 DIAGNOSIS — E11.9 DIABETES MELLITUS, TYPE 2 (H): Primary | ICD-10-CM

## 2023-12-20 DIAGNOSIS — E11.10 DIABETIC KETOACIDOSIS WITHOUT COMA ASSOCIATED WITH TYPE 2 DIABETES MELLITUS (H): ICD-10-CM

## 2023-12-20 PROCEDURE — 98967 PH1 ASSMT&MGMT NQHP 11-20: CPT | Mod: 93 | Performed by: DIETITIAN, REGISTERED

## 2023-12-20 NOTE — PATIENT INSTRUCTIONS
MY DIABETES TODAY:    1)  Goal A1C is under <7.0  Mine is:      Lab Results   Component Value Date    A1C 5.5 11/20/2023       2)  Goal LDL (bad cholesterol) under 100  (measured at least yearly)- I am currently at:   Lab Results   Component Value Date     11/20/2023       3)  Goal blood pressure under 130/80- mine was Data Unavailable today    Care Plan:  Reduce Lantus to 24 units.  Provided snack ideas: Greek yogurt with berries, 5 whole grain crackers w/ cheese, SF pudding, apple with peanut butter, celery with peanut butter, 3-4 cups popcorn, raw veggies with hummus, cottage cheese with 1 oz popcorners or veggie chips  Follow-up via [employer] in 2-3 weeks with glucose log    Follow up:  Call ([phone]), e-mail ([email or web address]), or send [employer]ES[employer] message to educator with blood sugar readings in 2-3 weeks.     Bring blood glucose meter and logbook with you to all doctor and follow-up appointments.     [facility] for the [facility]:  For Your Diabetes or Nutrition Education Appointments Call:  [phone]   For Diabetes or Nutrition Related Questions:   [phone]  Send SI[employer]ESEEKER Message   If you need a medication refill please contact your pharmacy. Please allow 3 business days for your refills to be completed.

## 2023-12-20 NOTE — LETTER
12/20/2023         RE: Kalen Callejas  [address] [facility] [address]        Dear Colleague,    Thank you for referring your patient, Kalen Callejas, to the [facility]. Please see a copy of my visit note below.    Diabetes Self-Management Education & Support    Presents for: Follow-up    Type of Service: Telephone Visit    Originating Location (Patient Location): Home  Distant Location (Provider Location): Offsite  Mode of Communication:  Telephone    Telephone Visit Start Time:  2:15  Telephone Visit End Time (telephone visit stop time): 2:30    How would patient like to obtain AVS? MyChart      ASSESSMENT:  Quick phone follow-up today to assess blood sugars and insulin dose.  Fasting glucose in the 88-92 range.  No BGs <80.  Pre-lunch and pre-dinner glucose levels are typically <100.  Taking 27 units Lantus.  Getting bored with diet variety and looking for more options.    Patient's most recent   Lab Results   Component Value Date    A1C 5.5 11/20/2023     is meeting goal of <7.0    Diabetes knowledge and skills assessment:   Patient is knowledgeable in diabetes management concepts related to: Healthy Eating, Being Active, Monitoring, Taking Medication, Problem Solving, and Reducing Risks    Continue education with the following diabetes management concepts: Healthy Eating, Taking Medication, and Problem Solving    Based on learning assessment above, most appropriate setting for further diabetes education would be: Individual setting.      PLAN    Reduce Lantus to 24 units.  Provided snack ideas: Greek yogurt with berries, 5 whole grain crackers w/ cheese, SF pudding, apple with peanut butter, celery with peanut butter, 3-4 cups popcorn, raw veggies with hummus, cottage cheese with 1 oz popcorners or veggie chips  Follow-up via [facility] in 2-3 weeks with glucose log    Topics to cover at upcoming visits: Healthy Eating, Taking Medication, and Problem  "Solving    Follow-up: 2-3 weeks    See Care Plan for co-developed, patient-state behavior change goals.  AVS provided for patient today.    Education Materials Provided:  No new materials provided today      SUBJECTIVE/OBJECTIVE:  Presents for: Follow-up  Accompanied by: Self  Diabetes education in the past 24mo: Yes  Focus of Visit: Taking Medication, Problem Solving  Diabetes type: Type 2  Disease course: Improving  How confident are you filling out medical forms by yourself:: Extremely  Other concerns:: None  Cultural Influences/Ethnic Background:  Not  or       Diabetes Symptoms & Complications:  Fatigue: No  Neuropathy: No  Polydipsia: No  Polyphagia: No  Polyuria: No  Visual change: No  Slow healing wounds: No  Weight trend: Decreasing  Complications assessed today?: No  Autonomic neuropathy: No  CVA: No  Heart disease: No  Nephropathy: No  Peripheral neuropathy: No  Peripheral Vascular Disease: No  Retinopathy: No  Sexual dysfunction: No    Patient Problem List and Family Medical History reviewed for relevant medical history, current medical status, and diabetes risk factors.    Vitals:  There were no vitals taken for this visit.  Estimated body mass index is 37.34 kg/m  as calculated from the following:    Height as of 10/2/23: 1.854 m (6' 1\").    Weight as of 10/2/23: 128.4 kg (283 lb).   Last 3 BP:   BP Readings from Last 3 Encounters:   10/02/23 (!) 146/93   08/31/23 119/83   08/29/23 124/80       History   Smoking Status     Never   Smokeless Tobacco     Never       Labs:  Lab Results   Component Value Date    A1C 5.5 11/20/2023     Lab Results   Component Value Date    GLC 91 11/20/2023     08/29/2023     06/17/2022     Lab Results   Component Value Date     11/20/2023     Direct Measure HDL   Date Value Ref Range Status   11/20/2023 44 >=40 mg/dL Final   ]  GFR Estimate   Date Value Ref Range Status   11/20/2023 >90 >60 mL/min/1.73m2 Final     GFR, ESTIMATED POCT " "  Date Value Ref Range Status   08/25/2023 56 (L) >60 mL/min/1.73m2 Final     No results found for: \"GFRESTBLACK\"  Lab Results   Component Value Date    CR 0.88 11/20/2023     No results found for: \"MICROALBUMIN\"    Healthy Eating:  Healthy Eating Assessed Today: Yes  Cultural/Adventist diet restrictions?: No  Meal planning/habits: Carb counting  How many times a week on average do you eat food made away from home (restaurant/take-out)?: 2  Meals include: Lunch, Breakfast, Dinner  Lunch: sometimes missing lunch  Snacks: pretzels, sometimes a cookie or peanut m&ms  Beverages: Water, Diet soda  Has patient met with a dietitian in the past?: Yes    Being Active:  Being Active Assessed Today: No  Days per week of moderate to strenuous exercise (like a brisk walk): 3  On average, minutes per day of exercise at this level: 60  How intense was your typical exercise? : Moderate (like brisk walking)  Exercise Minutes per Week: 180  Barrier to exercise: None    Monitoring:  Monitoring Assessed Today: Yes  Did patient bring glucose meter to appointment? : Yes  Blood Glucose Meter: Accu-chek  Times checking blood sugar at home (number): 2  Times checking blood sugar at home (per): Day  Blood glucose trend: No change        Taking Medications:  Diabetes Medication(s)       Biguanides       metFORMIN (GLUCOPHAGE XR) 500 MG 24 hr tablet Take 2 tablets (1,000 mg) by mouth 2 times daily (with meals)       Insulin       insulin glargine (LANTUS PEN) 100 UNIT/ML pen Inject 27 Units Subcutaneous every morning            Taking Medication Assessed Today: Yes  Current Treatments: Insulin Injections, Oral Medication (taken by mouth)  Dose schedule: Pre-breakfast  Given by: Patient  Injection/Infusion sites: Abdomen  Problems taking diabetes medications regularly?: No  Diabetes medication side effects?: No    Problem Solving:  Problem Solving Assessed Today: Yes  Is the patient at risk for hypoglycemia?: Yes  Hypoglycemia Frequency: " Other  Is the patient at risk for DKA?: No    Hypoglycemia symptoms  Feeling shaky: Yes         Reducing Risks:  Reducing Risks Assessed Today: No  CAD Risks: Diabetes Mellitus, Hypertension  Has dilated eye exam at least once a year?: No  Sees dentist every 6 months?: No  Feet checked by healthcare provider in the last year?: Yes    Healthy Coping:  Healthy Coping Assessed Today: Yes  Emotional response to diabetes: Ready to learn, Concern for health and well-being  Informal Support system:: Family  Stage of change: ACTION (Actively working towards change)  Support resources: None  Patient Activation Measure Survey Score:       No data to display                  Care Plan and Education Provided:  There are no care plans that you recently modified to display for this patient.      [facility]      Time Spent: 15 minutes  Encounter Type: Individual    Any diabetes medication dose changes were made via the CDE Protocol per the patient's referring provider. A copy of this encounter was shared with the provider.

## 2023-12-20 NOTE — PROGRESS NOTES
Diabetes Self-Management Education & Support    Presents for: Follow-up    Type of Service: Telephone Visit    Originating Location (Patient Location): Home  Distant Location (Provider Location): Offsite  Mode of Communication:  Telephone    Telephone Visit Start Time:  2:15  Telephone Visit End Time (telephone visit stop time): 2:30    How would patient like to obtain AVS? [id number]      ASSESSMENT:  Quick phone follow-up today to assess blood sugars and insulin dose.  Fasting glucose in the 88-92 range.  No BGs <80.  Pre-lunch and pre-dinner glucose levels are typically <100.  Taking 27 units Lantus.  Getting bored with diet variety and looking for more options.    Patient's most recent   Lab Results   Component Value Date    A1C 5.5 11/20/2023     is meeting goal of <7.0    Diabetes knowledge and skills assessment:   Patient is knowledgeable in diabetes management concepts related to: Healthy Eating, Being Active, Monitoring, Taking Medication, Problem Solving, and Reducing Risks    Continue education with the following diabetes management concepts: Healthy Eating, Taking Medication, and Problem Solving    Based on learning assessment above, most appropriate setting for further diabetes education would be: Individual setting.      PLAN    Reduce Lantus to 24 units.  Provided snack ideas: Greek yogurt with berries, 5 whole grain crackers w/ cheese, SF pudding, apple with peanut butter, celery with peanut butter, 3-4 cups popcorn, raw veggies with hummus, cottage cheese with 1 oz popcorners or veggie chips  Follow-up via [facility] in 2-3 weeks with glucose log    Topics to cover at upcoming visits: Healthy Eating, Taking Medication, and Problem Solving    Follow-up: 2-3 weeks    See Care Plan for co-developed, patient-state behavior change goals.  AVS provided for patient today.    Education Materials Provided:  No new materials provided today      SUBJECTIVE/OBJECTIVE:  Presents for: Follow-up  Accompanied by:  "Self  Diabetes education in the past 24mo: Yes  Focus of Visit: Taking Medication, Problem Solving  Diabetes type: Type 2  Disease course: Improving  How confident are you filling out medical forms by yourself:: Extremely  Other concerns:: None  Cultural Influences/Ethnic Background:  Not  or       Diabetes Symptoms & Complications:  Fatigue: No  Neuropathy: No  Polydipsia: No  Polyphagia: No  Polyuria: No  Visual change: No  Slow healing wounds: No  Weight trend: Decreasing  Complications assessed today?: No  Autonomic neuropathy: No  CVA: No  Heart disease: No  Nephropathy: No  Peripheral neuropathy: No  Peripheral Vascular Disease: No  Retinopathy: No  Sexual dysfunction: No    Patient Problem List and Family Medical History reviewed for relevant medical history, current medical status, and diabetes risk factors.    Vitals:  There were no vitals taken for this visit.  Estimated body mass index is 37.34 kg/m  as calculated from the following:    Height as of 10/2/23: 1.854 m (6' 1\").    Weight as of 10/2/23: 128.4 kg (283 lb).   Last 3 BP:   BP Readings from Last 3 Encounters:   10/02/23 (!) 146/93   08/31/23 119/83   08/29/23 124/80       History   Smoking Status    Never   Smokeless Tobacco    Never       Labs:  Lab Results   Component Value Date    A1C 5.5 11/20/2023     Lab Results   Component Value Date    GLC 91 11/20/2023     08/29/2023     06/17/2022     Lab Results   Component Value Date     11/20/2023     Direct Measure HDL   Date Value Ref Range Status   11/20/2023 44 >=40 mg/dL Final   ]  GFR Estimate   Date Value Ref Range Status   11/20/2023 >90 >60 mL/min/1.73m2 Final     GFR, ESTIMATED POCT   Date Value Ref Range Status   08/25/2023 56 (L) >60 mL/min/1.73m2 Final     No results found for: \"GFRESTBLACK\"  Lab Results   Component Value Date    CR 0.88 11/20/2023     No results found for: \"MICROALBUMIN\"    Healthy Eating:  Healthy Eating Assessed Today: " Yes  Cultural/Scientologist diet restrictions?: No  Meal planning/habits: Carb counting  How many times a week on average do you eat food made away from home (restaurant/take-out)?: 2  Meals include: Lunch, Breakfast, Dinner  Lunch: sometimes missing lunch  Snacks: pretzels, sometimes a cookie or peanut m&ms  Beverages: Water, Diet soda  Has patient met with a dietitian in the past?: Yes    Being Active:  Being Active Assessed Today: No  Days per week of moderate to strenuous exercise (like a brisk walk): 3  On average, minutes per day of exercise at this level: 60  How intense was your typical exercise? : Moderate (like brisk walking)  Exercise Minutes per Week: 180  Barrier to exercise: None    Monitoring:  Monitoring Assessed Today: Yes  Did patient bring glucose meter to appointment? : Yes  Blood Glucose Meter: Accu-chek  Times checking blood sugar at home (number): 2  Times checking blood sugar at home (per): Day  Blood glucose trend: No change        Taking Medications:  Diabetes Medication(s)       Biguanides       metFORMIN (GLUCOPHAGE XR) 500 MG 24 hr tablet Take 2 tablets (1,000 mg) by mouth 2 times daily (with meals)       Insulin       insulin glargine (LANTUS PEN) 100 UNIT/ML pen Inject 27 Units Subcutaneous every morning            Taking Medication Assessed Today: Yes  Current Treatments: Insulin Injections, Oral Medication (taken by mouth)  Dose schedule: Pre-breakfast  Given by: Patient  Injection/Infusion sites: Abdomen  Problems taking diabetes medications regularly?: No  Diabetes medication side effects?: No    Problem Solving:  Problem Solving Assessed Today: Yes  Is the patient at risk for hypoglycemia?: Yes  Hypoglycemia Frequency: Other  Is the patient at risk for DKA?: No    Hypoglycemia symptoms  Feeling shaky: Yes         Reducing Risks:  Reducing Risks Assessed Today: No  CAD Risks: Diabetes Mellitus, Hypertension  Has dilated eye exam at least once a year?: No  Sees dentist every 6 months?:  No  Feet checked by healthcare provider in the last year?: Yes    Healthy Coping:  Healthy Coping Assessed Today: Yes  Emotional response to diabetes: Ready to learn, Concern for health and well-being  Informal Support system:: Family  Stage of change: ACTION (Actively working towards change)  Support resources: None  Patient Activation Measure Survey Score:       No data to display                  Care Plan and Education Provided:  There are no care plans that you recently modified to display for this patient.      Apurva Mars RDN [facility]      Time Spent: 15 minutes  Encounter Type: Individual    Any diabetes medication dose changes were made via the CDE Protocol per the patient's referring provider. A copy of this encounter was shared with the provider.

## 2024-02-06 ENCOUNTER — LAB (OUTPATIENT)
Dept: LAB | Facility: CLINIC | Age: 64
End: 2024-02-06
Payer: COMMERCIAL

## 2024-02-06 DIAGNOSIS — E11.9 TYPE 2 DIABETES MELLITUS WITHOUT COMPLICATION, WITH LONG-TERM CURRENT USE OF INSULIN (H): ICD-10-CM

## 2024-02-06 DIAGNOSIS — Z79.4 TYPE 2 DIABETES MELLITUS WITHOUT COMPLICATION, WITH LONG-TERM CURRENT USE OF INSULIN (H): ICD-10-CM

## 2024-02-06 LAB — HBA1C MFR BLD: 5.8 % (ref 0–5.6)

## 2024-02-06 PROCEDURE — 83036 HEMOGLOBIN GLYCOSYLATED A1C: CPT

## 2024-02-06 PROCEDURE — 36415 COLL VENOUS BLD VENIPUNCTURE: CPT

## 2024-02-11 SDOH — HEALTH STABILITY: PHYSICAL HEALTH: ON AVERAGE, HOW MANY MINUTES DO YOU ENGAGE IN EXERCISE AT THIS LEVEL?: 50 MIN

## 2024-02-11 SDOH — HEALTH STABILITY: PHYSICAL HEALTH: ON AVERAGE, HOW MANY DAYS PER WEEK DO YOU ENGAGE IN MODERATE TO STRENUOUS EXERCISE (LIKE A BRISK WALK)?: 3 DAYS

## 2024-02-11 ASSESSMENT — SOCIAL DETERMINANTS OF HEALTH (SDOH): HOW OFTEN DO YOU GET TOGETHER WITH FRIENDS OR RELATIVES?: TWICE A WEEK

## 2024-02-12 ENCOUNTER — OFFICE VISIT (OUTPATIENT)
Dept: INTERNAL MEDICINE | Facility: CLINIC | Age: 64
End: 2024-02-12
Payer: COMMERCIAL

## 2024-02-12 VITALS
HEART RATE: 79 BPM | SYSTOLIC BLOOD PRESSURE: 134 MMHG | HEIGHT: 73 IN | TEMPERATURE: 97.9 F | DIASTOLIC BLOOD PRESSURE: 85 MMHG | OXYGEN SATURATION: 99 % | BODY MASS INDEX: 37.11 KG/M2 | WEIGHT: 280 LBS | RESPIRATION RATE: 16 BRPM

## 2024-02-12 DIAGNOSIS — E11.9 TYPE 2 DIABETES MELLITUS WITHOUT COMPLICATION, WITH LONG-TERM CURRENT USE OF INSULIN (H): ICD-10-CM

## 2024-02-12 DIAGNOSIS — E78.5 HYPERLIPIDEMIA LDL GOAL <100: ICD-10-CM

## 2024-02-12 DIAGNOSIS — Z79.4 TYPE 2 DIABETES MELLITUS WITHOUT COMPLICATION, WITH LONG-TERM CURRENT USE OF INSULIN (H): ICD-10-CM

## 2024-02-12 DIAGNOSIS — Z13.820 ENCOUNTER FOR OSTEOPOROSIS SCREENING IN ASYMPTOMATIC POSTMENOPAUSAL PATIENT: ICD-10-CM

## 2024-02-12 DIAGNOSIS — Z78.0 ENCOUNTER FOR OSTEOPOROSIS SCREENING IN ASYMPTOMATIC POSTMENOPAUSAL PATIENT: ICD-10-CM

## 2024-02-12 DIAGNOSIS — I10 BENIGN ESSENTIAL HYPERTENSION: ICD-10-CM

## 2024-02-12 DIAGNOSIS — Z00.00 WELCOME TO MEDICARE PREVENTIVE VISIT: Primary | ICD-10-CM

## 2024-02-12 PROCEDURE — 99396 PREV VISIT EST AGE 40-64: CPT

## 2024-02-12 PROCEDURE — 99214 OFFICE O/P EST MOD 30 MIN: CPT | Mod: 25

## 2024-02-12 RX ORDER — METFORMIN HCL 500 MG
1000 TABLET, EXTENDED RELEASE 24 HR ORAL 2 TIMES DAILY WITH MEALS
Qty: 360 TABLET | Refills: 1 | Status: SHIPPED | OUTPATIENT
Start: 2024-02-12 | End: 2024-05-07

## 2024-02-12 RX ORDER — LOSARTAN POTASSIUM 50 MG/1
50 TABLET ORAL DAILY
Qty: 90 TABLET | Refills: 1 | Status: SHIPPED | OUTPATIENT
Start: 2024-02-12 | End: 2024-02-12

## 2024-02-12 RX ORDER — LOSARTAN POTASSIUM 100 MG/1
100 TABLET ORAL DAILY
Qty: 90 TABLET | Refills: 1 | Status: SHIPPED | OUTPATIENT
Start: 2024-02-12 | End: 2024-05-07

## 2024-02-12 ASSESSMENT — ENCOUNTER SYMPTOMS
UNEXPECTED WEIGHT CHANGE: 0
WHEEZING: 0
BLOOD IN STOOL: 0
DIFFICULTY URINATING: 0
SHORTNESS OF BREATH: 0
ABDOMINAL PAIN: 0
PALPITATIONS: 0
CHEST TIGHTNESS: 0
HEMATURIA: 0

## 2024-02-12 NOTE — PROGRESS NOTES
Preventive Care Visit  [facility]  REVA Sanabria CNP, Internal Medicine  Feb 12, 2024      SUBJECTIVE:   Иван is a 63 year old, presenting for the following:  Wellness Visit        2/12/2024     9:54 AM   Additional Questions   Roomed by May     Are you in the first 12 months of your Medicare coverage?  Yes,  Visual Acuity:  Right Eye: 20/40   Left Eye: 20/30  Both Eyes: 20/25    HPI    Today's PHQ-2 Score:       2/11/2024     3:36 PM   PHQ-2 ( 1999 [employer])   Q1: Little interest or pleasure in doing things 0   Q2: Feeling down, depressed or hopeless 0   PHQ-2 Score 0   Q1: Little interest or pleasure in doing things Not at all   Q2: Feeling down, depressed or hopeless Not at all   PHQ-2 Score 0           Via the Health Maintenance questionnaire, the patient has reported the following services have been completed -Eye Exam, this information has been sent to the abstraction team.  Have you ever done Advance Care Planning? (For example, a Health Directive, POLST, or a discussion with a medical provider or your loved ones about your wishes): No, advance care planning information given to patient to review.  Patient declined advance care planning discussion at this time.       Fall risk  Fallen 2 or more times in the past year?: No    Cognitive Screening   1) Repeat 3 items (Leader, Season, Table)    2) Clock draw: NORMAL  3) 3 item recall: Recalls NO objects   Results: 0 items recalled: PROBABLE COGNITIVE IMPAIRMENT, **INFORM PROVIDER**    Mini-CogTM Copyright FRANCES Brown. Licensed by the author for use in [facility]; reprinted with permission ([email or web address]). All rights reserved.      Do you have sleep apnea, excessive snoring or daytime drowsiness? : no    Reviewed and updated as needed this visit by clinical staff   Tobacco  Allergies  Meds   Med Hx  Surg Hx  Fam Hx  Soc Hx        Reviewed and updated as needed this visit by Provider                  Social History      Tobacco Use    Smoking status: Never    Smokeless tobacco: Never   Substance Use Topics    Alcohol use: Not Currently             2/11/2024     3:35 PM   Alcohol Use   Prescreen: >3 drinks/day or >7 drinks/week? No     Do you have a current opioid prescription? No  Do you use any other controlled substances or medications that are not prescribed by a provider? None          Current providers sharing in care for this patient include:   Patient Care Team:  Santa Larkin APRN CNP as PCP - General (Internal Medicine)  Sulaiman Akins MD as Assigned Neuroscience Provider  Santa Larkin APRN CNP as Assigned PCP  Apurva Mars RD as Diabetes Educator (Dietitian, Registered)    The following health maintenance items are reviewed in Epic and correct as of today:  Health Maintenance   Topic Date Due    YEARLY PREVENTIVE VISIT  Never done    EYE EXAM  Never done    ZOSTER IMMUNIZATION (1 of 2) Never done    A1C  05/06/2024    COLORECTAL CANCER SCREENING  08/27/2024    ANNUAL REVIEW OF HM ORDERS  10/02/2024    BMP  11/20/2024    LIPID  11/20/2024    MICROALBUMIN  11/20/2024    DIABETIC FOOT EXAM  11/30/2024    ADVANCE CARE PLANNING  02/12/2029    DTAP/TDAP/TD IMMUNIZATION (3 - Td or Tdap) 09/04/2030    PHQ-2 (once per calendar year)  Completed    INFLUENZA VACCINE  Completed    Pneumococcal Vaccine: Pediatrics (0 to 5 Years) and At-Risk Patients (6 to 64 Years)  Completed    RSV VACCINE (Pregnancy & 60+)  Completed    COVID-19 Vaccine  Completed    IPV IMMUNIZATION  Aged Out    HPV IMMUNIZATION  Aged Out    MENINGITIS IMMUNIZATION  Aged Out    RSV MONOCLONAL ANTIBODY  Aged Out    HEPATITIS C SCREENING  Discontinued    HIV SCREENING  Discontinued     Labs reviewed in EPIC        Review of Systems   Constitutional:  Negative for unexpected weight change.   Respiratory:  Negative for chest tightness, shortness of breath and wheezing.    Cardiovascular:  Negative for chest pain and palpitations.   Gastrointestinal:   "Negative for abdominal pain and blood in stool.   Genitourinary:  Negative for difficulty urinating and hematuria.          OBJECTIVE:   /85   Pulse 79   Temp 97.9  F (36.6  C) (Oral)   Resp 16   Ht 1.854 m (6' 1\")   Wt 127 kg (280 lb)   SpO2 99%   BMI 36.94 kg/m     Estimated body mass index is 36.94 kg/m  as calculated from the following:    Height as of this encounter: 1.854 m (6' 1\").    Weight as of this encounter: 127 kg (280 lb).      Physical Exam  Constitutional:       General: He is not in acute distress.     Appearance: Normal appearance. He is not ill-appearing, toxic-appearing or diaphoretic.   HENT:      Head: Normocephalic and atraumatic.   Eyes:      Conjunctiva/sclera: Conjunctivae normal.   Cardiovascular:      Rate and Rhythm: Normal rate and regular rhythm.      Heart sounds: Normal heart sounds.   Pulmonary:      Effort: Pulmonary effort is normal.      Breath sounds: Normal breath sounds.   Skin:     General: Skin is warm and dry.   Neurological:      Mental Status: He is alert and oriented to person, place, and time.   Psychiatric:         Mood and Affect: Mood normal.         Behavior: Behavior normal.         Thought Content: Thought content normal.         Judgment: Judgment normal.       Diagnostic Test Results:  Labs reviewed in Epic    ASSESSMENT / PLAN:   (Z00.00) Welcome to Medicare preventive visit  (primary encounter diagnosis)  Comment: Presents for annual visit. He is not willing to discuss having colonoscopy done at this time as he states he has too many things going on right now. This was last done in 2010. It doesn't appear that he had any polyps found but per chart review, recommendation stated \" collect hemoccults on three spontaneously passed stools annually as well as having flex sig done in 3 years, which was not completed.  Plan:     (E78.5) Hyperlipidemia LDL goal <100  Comment: Due to update LDL since starting Lipitor 10MG  Plan: Lipid panel reflex to direct " LDL Fasting            (E11.9,  Z79.4) Type 2 diabetes mellitus without complication, with long-term current use of insulin (H)  Comment:   He is interested in starting GLP-1. He denies any history of pancreatitis, gastroparesis or family history of medullary thyroid cancer. A1C is extremely well controlled at 5.8%. we will plan to eventually come off of insulin. I will send rx for ozempic and have him decrease insulin from 24 units to 16 units when he starts GLP-1.    He will see me in 3 months but will send me updates in 3 weeks after starting GLP-1 dose.  Will continue on Metformin XR  Plan: metFORMIN (GLUCOPHAGE XR) 500 MG 24 hr tablet,         insulin pen needle (32G X 4 MM) 32G X 4 MM         miscellaneous, semaglutide (OZEMPIC) 2 MG/3ML         pen              (I10) Benign essential hypertension  Comment: would like BP around 130/80 with history of diabetes. He will increase Losartan from 50MG to 100MG  Plan: losartan (COZAAR) 100 MG tablet    (Z13.820,  Z78.0) Encounter for osteoporosis screening in asymptomatic postmenopausal patient  Comment:           History of T12 burst fracture in 2022 when he fell backward against his kitchen counter- I would like him to have DXA completed   Plan: DX Hip/Pelvis/Spine        Patient has been advised of split billing requirements and indicates understanding: Yes      Counseling  Reviewed preventive health counseling, as reflected in patient instructions       Regular exercise       Healthy diet/nutrition       Colon cancer screening        He reports that he has never smoked. He has never used smokeless tobacco.      Appropriate preventive services were discussed with this patient, including applicable screening as appropriate for fall prevention, nutrition, physical activity, Tobacco-use cessation, weight loss and cognition.  Checklist reviewing preventive services available has been given to the patient.    Reviewed patients plan of care and provided an AVS. The  Basic Care Plan (routine screening as documented in Health Maintenance) for Kalen meets the Care Plan requirement. This Care Plan has been established and reviewed with the Patient.          Signed Electronically by: REVA Sanabria CNP    Identified Health Risks  I have reviewed Opioid Use Disorder and Substance Use Disorder risk factors and made any needed referrals.

## 2024-02-12 NOTE — PATIENT INSTRUCTIONS
Wait to hear from the pharmacy when the new medication is ready (This could be ozempic, it will depend what insurance will cover). 3 weeks after starting this new medication, you will send me a message or call and let me know how you are doing so we can then increase to next dose.    2. Increase Losartan from 50MG to 100MG. Take 2 of the 50MG tablets you have at home to use up what you have left. The new prescription will be 100MG tablets.      3. Once you start the Ozempic, go ahead and decrease your insulin to 16 units. If having any blood sugars <80, please let me know.    4. Make fasting lab only appt for cholesterol    Please let me know if you have any questions.    Thanks!  REVA Sanabria, CNP   [facility]

## 2024-02-28 ENCOUNTER — LAB (OUTPATIENT)
Dept: LAB | Facility: CLINIC | Age: 64
End: 2024-02-28
Payer: COMMERCIAL

## 2024-02-28 DIAGNOSIS — E78.5 HYPERLIPIDEMIA LDL GOAL <100: ICD-10-CM

## 2024-02-28 LAB
CHOLEST SERPL-MCNC: 101 MG/DL
FASTING STATUS PATIENT QL REPORTED: YES
HDLC SERPL-MCNC: 36 MG/DL
LDLC SERPL CALC-MCNC: 54 MG/DL
NONHDLC SERPL-MCNC: 65 MG/DL
TRIGL SERPL-MCNC: 56 MG/DL

## 2024-02-28 PROCEDURE — 80061 LIPID PANEL: CPT

## 2024-02-28 PROCEDURE — 36415 COLL VENOUS BLD VENIPUNCTURE: CPT

## 2024-03-01 ENCOUNTER — TELEPHONE (OUTPATIENT)
Dept: INTERNAL MEDICINE | Facility: CLINIC | Age: 64
End: 2024-03-01
Payer: COMMERCIAL

## 2024-03-01 DIAGNOSIS — E11.9 TYPE 2 DIABETES MELLITUS WITHOUT COMPLICATION, WITH LONG-TERM CURRENT USE OF INSULIN (H): ICD-10-CM

## 2024-03-01 DIAGNOSIS — Z79.4 TYPE 2 DIABETES MELLITUS WITHOUT COMPLICATION, WITH LONG-TERM CURRENT USE OF INSULIN (H): ICD-10-CM

## 2024-03-01 NOTE — TELEPHONE ENCOUNTER
S-(situation): patient requesting to increase Ozempic dose.     B-(background): Prescribed ozempic 2/12/24    A-(assessment): 3/4/24 will be 4th dose of Ozempic, patient would like to titrate up (0.5 mg) dose and lower lantus dose which is at 17 units right now. Denies any new GI symptoms. Still on metformin 2x daily 1000 mg. Blood sugars have been averaging from 105-118    R-(recommendations): Patient would like to increase Ozempic to 0.5 mg and decrease Lantus if appropriate. Will likely need a new order with dose increase, pended pen and pharmacy- sign if appropriate    Janeth MARIE

## 2024-03-03 NOTE — TELEPHONE ENCOUNTER
Sounds great! I sent for 0.5MG dose.  Have him let me know how he is doing in a month and we can move up to the next dose if he is tolerating it well at that time

## 2024-03-04 RX ORDER — ATORVASTATIN CALCIUM 10 MG/1
10 TABLET, FILM COATED ORAL DAILY
Qty: 90 TABLET | Refills: 1 | Status: SHIPPED | OUTPATIENT
Start: 2024-03-04 | End: 2024-05-07

## 2024-03-04 NOTE — TELEPHONE ENCOUNTER
Call to pt and advised.   Pt states he is taking 17 units of his Lantus. He is asking if Santa wants to lower this further with the increase of the Ozempic? He is afraid he will go too low.     FASTING BS, Averaging about 115  -120

## 2024-03-14 DIAGNOSIS — E11.10 DIABETIC KETOACIDOSIS WITHOUT COMA ASSOCIATED WITH TYPE 2 DIABETES MELLITUS (H): ICD-10-CM

## 2024-04-02 ENCOUNTER — DOCUMENTATION ONLY (OUTPATIENT)
Dept: LAB | Facility: CLINIC | Age: 64
End: 2024-04-02
Payer: COMMERCIAL

## 2024-04-02 DIAGNOSIS — Z79.4 TYPE 2 DIABETES MELLITUS WITHOUT COMPLICATION, WITH LONG-TERM CURRENT USE OF INSULIN (H): Primary | ICD-10-CM

## 2024-04-02 DIAGNOSIS — E11.9 TYPE 2 DIABETES MELLITUS WITHOUT COMPLICATION, WITH LONG-TERM CURRENT USE OF INSULIN (H): Primary | ICD-10-CM

## 2024-04-08 ENCOUNTER — TELEPHONE (OUTPATIENT)
Dept: INTERNAL MEDICINE | Facility: CLINIC | Age: 64
End: 2024-04-08
Payer: COMMERCIAL

## 2024-04-08 DIAGNOSIS — Z79.4 TYPE 2 DIABETES MELLITUS WITHOUT COMPLICATION, WITH LONG-TERM CURRENT USE OF INSULIN (H): Primary | ICD-10-CM

## 2024-04-08 DIAGNOSIS — E11.9 TYPE 2 DIABETES MELLITUS WITHOUT COMPLICATION, WITH LONG-TERM CURRENT USE OF INSULIN (H): Primary | ICD-10-CM

## 2024-04-08 NOTE — TELEPHONE ENCOUNTER
Could be temporarily a little bit higher since we decreased his lantus. Not to worry though.  Let's increase to the next dose which is 1MG- I sent rx to pharmacy.        He needs to send me update in another 3 weeks so I can send next dose increase at that time

## 2024-04-08 NOTE — TELEPHONE ENCOUNTER
Called and spoke with patient to relay provider message. Patient verbalizes understanding of instructions and indicates no further questions at this time. He will call back to send update in 3 weeks as provider requested.     Thank you,  Richard Colin, Triage RN [facility]  1:39 PM 4/8/2024

## 2024-04-29 ENCOUNTER — TELEPHONE (OUTPATIENT)
Dept: INTERNAL MEDICINE | Facility: CLINIC | Age: 64
End: 2024-04-29

## 2024-04-29 ENCOUNTER — LAB (OUTPATIENT)
Dept: LAB | Facility: CLINIC | Age: 64
End: 2024-04-29
Payer: COMMERCIAL

## 2024-04-29 DIAGNOSIS — Z79.4 TYPE 2 DIABETES MELLITUS WITHOUT COMPLICATION, WITH LONG-TERM CURRENT USE OF INSULIN (H): ICD-10-CM

## 2024-04-29 DIAGNOSIS — E11.9 TYPE 2 DIABETES MELLITUS WITHOUT COMPLICATION, WITH LONG-TERM CURRENT USE OF INSULIN (H): ICD-10-CM

## 2024-04-29 LAB — HBA1C MFR BLD: 6.1 % (ref 0–5.6)

## 2024-04-29 PROCEDURE — 36415 COLL VENOUS BLD VENIPUNCTURE: CPT

## 2024-04-29 PROCEDURE — 83036 HEMOGLOBIN GLYCOSYLATED A1C: CPT

## 2024-04-29 NOTE — TELEPHONE ENCOUNTER
We can discuss this at his appt next week- this is not of any concern. 5.8 to 6.1% is a very minimal change and his A1C is still under excellent control at 6.1%. Our goal is <7% which he is still under.

## 2024-04-29 NOTE — TELEPHONE ENCOUNTER
Pt calls regarding his A1C results. He is very concerned about the number going from 5.8 to 6.1.     He thinks this is elevated and is worried that it is going to go up higher.     His fasting BS are 100-115.     He reports he is watching his diet.     He has appt next week, on 5/7 but since Santa's message said to call with questions, he is calling.     Any further advise?     Per Results note:   Bill,  A1C looks great at 6.1%.  Please let me know if you have any questions.  Thanks!  Santa Larkin, APRN, CNP

## 2024-04-29 NOTE — TELEPHONE ENCOUNTER
Spoke with patient and relayed provider message. Patient understood and will be sure to discuss next week

## 2024-04-30 RX ORDER — SEMAGLUTIDE 1.34 MG/ML
1 INJECTION, SOLUTION SUBCUTANEOUS
Qty: 3 ML | Refills: 0 | Status: SHIPPED | OUTPATIENT
Start: 2024-04-30 | End: 2024-05-07

## 2024-05-07 ENCOUNTER — VIRTUAL VISIT (OUTPATIENT)
Dept: INTERNAL MEDICINE | Facility: CLINIC | Age: 64
End: 2024-05-07
Payer: COMMERCIAL

## 2024-05-07 DIAGNOSIS — Z79.4 TYPE 2 DIABETES MELLITUS WITHOUT COMPLICATION, WITH LONG-TERM CURRENT USE OF INSULIN (H): Primary | ICD-10-CM

## 2024-05-07 DIAGNOSIS — E78.5 HYPERLIPIDEMIA LDL GOAL <100: ICD-10-CM

## 2024-05-07 DIAGNOSIS — E11.9 TYPE 2 DIABETES MELLITUS WITHOUT COMPLICATION, WITH LONG-TERM CURRENT USE OF INSULIN (H): Primary | ICD-10-CM

## 2024-05-07 DIAGNOSIS — I10 BENIGN ESSENTIAL HYPERTENSION: ICD-10-CM

## 2024-05-07 PROCEDURE — 99214 OFFICE O/P EST MOD 30 MIN: CPT | Mod: 95

## 2024-05-07 RX ORDER — LOSARTAN POTASSIUM 100 MG/1
100 TABLET ORAL DAILY
Qty: 90 TABLET | Refills: 3 | Status: SHIPPED | OUTPATIENT
Start: 2024-05-07

## 2024-05-07 RX ORDER — METFORMIN HCL 500 MG
1000 TABLET, EXTENDED RELEASE 24 HR ORAL 2 TIMES DAILY WITH MEALS
Qty: 360 TABLET | Refills: 3 | Status: SHIPPED | OUTPATIENT
Start: 2024-05-07

## 2024-05-07 RX ORDER — ATORVASTATIN CALCIUM 10 MG/1
10 TABLET, FILM COATED ORAL DAILY
Qty: 90 TABLET | Refills: 3 | Status: SHIPPED | OUTPATIENT
Start: 2024-05-07

## 2024-05-07 NOTE — PROGRESS NOTES
Иван is a 64 year old who is being evaluated via a billable video visit.    How would you like to obtain your AVS? MyChart  If the video visit is dropped, the invitation should be resent by: Text to cell phone: [phone]  Will anyone else be joining your video visit? No      Assessment & Plan     (E11.9,  Z79.4) Type 2 diabetes mellitus without complication, with long-term current use of insulin (H)  (primary encounter diagnosis)  Comment:     We started him on Ozempic a few months ago and has been tolerating this very well besides some moderate constipation for which he has been using Metamucil.  We discussed today if he continues to have difficulty with constipation and Metamucil is not enough that he can try other over-the-counter medications such as senna or Dulcolax.  He is tolerating the 1 mg dose very well so I will go ahead and send next refills for the 2 mg dosage.  A1c remains under excellent control at 6.1%.  He has been on 12 units of Lantus and tells me fasting blood sugars are always around 100.  Thus, I will have him decrease Lantus to 6 units starting tomorrow and if after 10 to 14 days fasting blood sugars remain at less than 100, he can go ahead and stop the Lantus altogether.  I will plan for him to see me in person in 6 months for diabetes visit with labs prior.      Plan: metFORMIN (GLUCOPHAGE XR) 500 MG 24 hr tablet,         Semaglutide, 2 MG/DOSE, (OZEMPIC) 8 MG/3ML pen,        Basic metabolic panel  (Ca, Cl, CO2, Creat,         Gluc, K, Na, BUN), Hemoglobin A1c, Albumin         Random Urine Quantitative with Creat Ratio            (I10) Benign essential hypertension  Comment:   Plan: losartan (COZAAR) 100 MG tablet            (E78.5) Hyperlipidemia LDL goal <100  Comment: LDL at goal at 54 with Lipitor 10 mg  Plan: atorvastatin (LIPITOR) 10 MG tablet                    BMI  Estimated body mass index is 36.94 kg/m  as calculated from the following:    Height as of 2/12/24: 1.854 m (6'  "1\").    Weight as of 2/12/24: 127 kg (280 lb).   Weight management plan: Discussed healthy diet and exercise guidelines                Francisco Oates is a 64 year old, presenting for the following health issues:  Follow Up        5/7/2024     2:29 PM   Additional Questions   Roomed by Nery     History of Present Illness     Asthma:  He presents for follow up of asthma.  He has no cough, no wheezing, and no shortness of breath. He is not using a relief medication.  He does not miss any doses of his controller medication throughout the week. Patient is aware of the following triggers: same as previous visit. The patient has not had a visit to the Emergency Room, Urgent Care or Hospital due to asthma since the last clinic visit.     Diabetes:   He presents for follow up of diabetes.  He is checking home blood glucose one time daily.   He checks blood glucose before meals.  Blood glucose is never over 200 and never under 70.  When his blood glucose is low, the patient is asymptomatic for confusion, blurred vision, lethargy and reports not feeling dizzy, shaky, or weak.   He has no concerns regarding his diabetes at this time.   He is not experiencing numbness or burning in feet, excessive thirst, blurry vision, weight changes or redness, sores or blisters on feet.           He eats 2-3 servings of fruits and vegetables daily.He consumes 0 sweetened beverage(s) daily.He exercises with enough effort to increase his heart rate 30 to 60 minutes per day.  He exercises with enough effort to increase his heart rate 6 days per week.   He is taking medications regularly.       Diabetes Follow-up    How often are you checking your blood sugar? One time daily  What time of day are you checking your blood sugars (select all that apply)?  Before meals  Have you had any blood sugars above 200?  No  Have you had any blood sugars below 70?  No  What symptoms do you notice when your blood sugar is low?  Other: Nervus   What " concerns do you have today about your diabetes? None   Do you have any of these symptoms? (Select all that apply)  No numbness or tingling in feet.  No redness, sores or blisters on feet.  No complaints of excessive thirst.  No reports of blurry vision.  No significant changes to weight.          Hyperlipidemia Follow-Up    Are you regularly taking any medication or supplement to lower your cholesterol?   Yes-    Are you having muscle aches or other side effects that you think could be caused by your cholesterol lowering medication?  No    Hypertension Follow-up    Do you check your blood pressure regularly outside of the clinic? Yes   Are you following a low salt diet? No  Are your blood pressures ever more than 140 on the top number (systolic) OR more   than 90 on the bottom number (diastolic), for example 140/90? Yes    BP Readings from Last 2 Encounters:   02/12/24 134/85   10/02/23 (!) 146/93     Hemoglobin A1C (%)   Date Value   04/29/2024 6.1 (H)   02/06/2024 5.8 (H)     LDL Cholesterol Calculated (mg/dL)   Date Value   02/28/2024 54   11/20/2023 124 (H)                 Objective    Vitals - Patient Reported  Systolic (Patient Reported): 116  Diastolic (Patient Reported): 70  Pulse (Patient Reported): 97        Physical Exam   GENERAL: alert and no distress  EYES: Eyes grossly normal to inspection.  No discharge or erythema, or obvious scleral/conjunctival abnormalities.  RESP: No audible wheeze, cough, or visible cyanosis.    SKIN: Visible skin clear. No significant rash, abnormal pigmentation or lesions.  NEURO: Cranial nerves grossly intact.  Mentation and speech appropriate for age.  PSYCH: Appropriate affect, tone, and pace of words          Video-Visit Details    Type of service:  Video Visit   Originating Location (pt. Location): Home    Distant Location (provider location):  On-site  Platform used for Video Visit: Lina  Signed Electronically by: REVA Sanabria CNP

## 2024-08-10 ENCOUNTER — HEALTH MAINTENANCE LETTER (OUTPATIENT)
Age: 64
End: 2024-08-10

## 2024-08-19 ENCOUNTER — ANCILLARY PROCEDURE (OUTPATIENT)
Dept: BONE DENSITY | Facility: CLINIC | Age: 64
End: 2024-08-19
Payer: COMMERCIAL

## 2024-08-19 DIAGNOSIS — Z13.820 ENCOUNTER FOR OSTEOPOROSIS SCREENING IN ASYMPTOMATIC POSTMENOPAUSAL PATIENT: ICD-10-CM

## 2024-08-19 DIAGNOSIS — Z78.0 ENCOUNTER FOR OSTEOPOROSIS SCREENING IN ASYMPTOMATIC POSTMENOPAUSAL PATIENT: ICD-10-CM

## 2024-08-19 PROCEDURE — 77081 DXA BONE DENSITY APPENDICULR: CPT | Mod: TC | Performed by: PHYSICIAN ASSISTANT

## 2024-08-19 PROCEDURE — 77080 DXA BONE DENSITY AXIAL: CPT | Mod: TC | Performed by: PHYSICIAN ASSISTANT

## 2024-08-23 ENCOUNTER — VIRTUAL VISIT (OUTPATIENT)
Dept: INTERNAL MEDICINE | Facility: CLINIC | Age: 64
End: 2024-08-23
Payer: COMMERCIAL

## 2024-08-23 DIAGNOSIS — E11.9 TYPE 2 DIABETES MELLITUS WITHOUT COMPLICATION, WITH LONG-TERM CURRENT USE OF INSULIN (H): ICD-10-CM

## 2024-08-23 DIAGNOSIS — M81.6 LOCALIZED OSTEOPOROSIS WITHOUT CURRENT PATHOLOGICAL FRACTURE: Primary | ICD-10-CM

## 2024-08-23 DIAGNOSIS — Z79.4 TYPE 2 DIABETES MELLITUS WITHOUT COMPLICATION, WITH LONG-TERM CURRENT USE OF INSULIN (H): ICD-10-CM

## 2024-08-23 DIAGNOSIS — S22.081A T12 BURST FRACTURE (H): ICD-10-CM

## 2024-08-23 PROCEDURE — G2211 COMPLEX E/M VISIT ADD ON: HCPCS | Mod: 95

## 2024-08-23 PROCEDURE — 99214 OFFICE O/P EST MOD 30 MIN: CPT | Mod: 95

## 2024-08-23 RX ORDER — ALENDRONATE SODIUM 70 MG/1
70 TABLET ORAL
Qty: 13 TABLET | Refills: 3 | Status: SHIPPED | OUTPATIENT
Start: 2024-08-23

## 2024-08-23 NOTE — PROGRESS NOTES
"Иван is a 64 year old who is being evaluated via a billable video visit.    {ROOMING STAFF complete during rooming of virtual visit (Optional):292266}  {If patient encounters technical issues they should call [phone] :611216}    {PROVIDER CHARTING PREFERENCE:593587}    Subjective   Иван is a 64 year old, presenting for the following health issues:  No chief complaint on file.      8/23/2024    11:26 AM   Additional Questions   Roomed by May CAAL     {SUPERLIST (Optional):639037}  {additonal problems for provider to add (Optional):008088}    {ROS Picklists (Optional):969860}      Objective           Vitals:  No vitals were obtained today due to virtual visit.    Physical Exam   {video visit exam brief selected:942021}    {Diagnostic Test Results (Optional):955391}      Video-Visit Details    Type of service:  Video Visit   Originating Location (pt. Location): {video visit patient location:463017::\"Home\"}  {PROVIDER LOCATION On-site should be selected for visits conducted from your clinic location or adjoining [facility] hospital, academic office, or other nearby [facility] building. Off-site should be selected for all other provider locations, including home:147439}  Distant Location (provider location):  {virtual location provider:517419}  Platform used for Video Visit: {Virtual Visit Platforms:511662::\"[employer]\"}  Signed Electronically by: REVA Sanabria CNP  {Email feedback regarding this note to [email or web address]   :722251}  "

## 2024-08-23 NOTE — PROGRESS NOTES
Иван is a 64 year old who is being evaluated via a billable video visit.    How would you like to obtain your AVS? Dr Lal [facility]  If the video visit is dropped, the invitation should be resent by: Text to cell phone: [phone]  Will anyone else be joining your video visit? No      Assessment & Plan     (M81.6) Localized osteoporosis without current pathological fracture  (primary encounter diagnosis)  Comment: He is agreeable to start treatment with Fosamax for underlying osteoporosis. His recent DXA showed moderate osteopenia with  10 year probability of major osteoporotic fracture is 20.6%, and of hip fracture is 2.6%, based on right femoral neck BMD.  We will repeat DXA in 2026 and plan to treat with Fosamax for 5 years.   Plan: alendronate (FOSAMAX) 70 MG tablet            (E11.9,  Z79.4) Type 2 diabetes mellitus without complication, with long-term current use of insulin (H)  Comment: A1C under excellent control at 6.1%.   He is doing great with Ozempic.  His current weight is 253. He is trying to walk 10,000 steps per day. He will see me in 2-3 months for diabetes follow up.     (S22.249N) T12 burst fracture (H)  Comment: history of T12 burst fracture in 2022    The longitudinal plan of care for the diagnosis(es)/condition(s) as documented were addressed during this visit. Due to the added complexity in care, I will continue to support Иван in the subsequent management and with ongoing continuity of care.      Subjective   Иван is a 64 year old, presenting for the following health issues:  Results (Dexa/)      8/23/2024    11:26 AM   Additional Questions   Roomed by May CAAL         Objective           Vitals:  No vitals were obtained today due to virtual visit.    Physical Exam   GENERAL: alert and no distress  EYES: Eyes grossly normal to inspection.  No discharge or erythema, or obvious scleral/conjunctival abnormalities.  RESP: No audible wheeze, cough, or visible cyanosis.    SKIN: Visible skin clear.  No significant rash, abnormal pigmentation or lesions.  NEURO: Cranial nerves grossly intact.  Mentation and speech appropriate for age.  PSYCH: Appropriate affect, tone, and pace of words          Video-Visit Details    Type of service:  Video Visit   Originating Location (pt. Location): Home    Distant Location (provider location):  Off-site  Platform used for Video Visit: Lina  Signed Electronically by: REVA Sanabria CNP

## 2024-09-23 ENCOUNTER — DOCUMENTATION ONLY (OUTPATIENT)
Dept: LAB | Facility: CLINIC | Age: 64
End: 2024-09-23
Payer: COMMERCIAL

## 2024-09-23 NOTE — PROGRESS NOTES
Kalen Callejas has an upcoming lab appointment:    Future Appointments   Date Time Provider Department Center   10/7/2024  7:15 AM [facility]   10/10/2024  7:30 AM Santa Larkin, APRN CNP [facility]     Please change expecting date, thank you    There is no order available. Please review and place either future orders or HMPO (Review of Health Maintenance Protocol Orders), as appropriate.    Health Maintenance Due   Topic    A1C     ANNUAL REVIEW OF HM ORDERS      Jass Myers

## 2024-10-03 ENCOUNTER — DOCUMENTATION ONLY (OUTPATIENT)
Dept: INTERNAL MEDICINE | Facility: CLINIC | Age: 64
End: 2024-10-03
Payer: COMMERCIAL

## 2024-10-03 DIAGNOSIS — Z79.4 TYPE 2 DIABETES MELLITUS WITHOUT COMPLICATION, WITH LONG-TERM CURRENT USE OF INSULIN (H): ICD-10-CM

## 2024-10-03 DIAGNOSIS — E11.9 TYPE 2 DIABETES MELLITUS WITHOUT COMPLICATION, WITH LONG-TERM CURRENT USE OF INSULIN (H): ICD-10-CM

## 2024-10-03 NOTE — PROGRESS NOTES
Kalen Callejas has an upcoming lab appointment:    Future Appointments   Date Time Provider Department Center   10/7/2024  7:15 AM [facility]   10/10/2024  7:30 AM Santa Larkin, APRN CNP [facility]       There are orders available but are not due until November. Please review and change the expected date if appropriate.    Health Maintenance Due   Topic    A1C     ANNUAL REVIEW OF  ORDERS      Yanci Koroma

## 2024-10-09 ENCOUNTER — LAB (OUTPATIENT)
Dept: LAB | Facility: CLINIC | Age: 64
End: 2024-10-09
Payer: COMMERCIAL

## 2024-10-09 DIAGNOSIS — E11.9 TYPE 2 DIABETES MELLITUS WITHOUT COMPLICATION, WITH LONG-TERM CURRENT USE OF INSULIN (H): ICD-10-CM

## 2024-10-09 DIAGNOSIS — Z79.4 TYPE 2 DIABETES MELLITUS WITHOUT COMPLICATION, WITH LONG-TERM CURRENT USE OF INSULIN (H): ICD-10-CM

## 2024-10-09 LAB
EST. AVERAGE GLUCOSE BLD GHB EST-MCNC: 111 MG/DL
HBA1C MFR BLD: 5.5 % (ref 0–5.6)

## 2024-10-09 PROCEDURE — 80048 BASIC METABOLIC PNL TOTAL CA: CPT

## 2024-10-09 PROCEDURE — 36415 COLL VENOUS BLD VENIPUNCTURE: CPT

## 2024-10-09 PROCEDURE — 83036 HEMOGLOBIN GLYCOSYLATED A1C: CPT

## 2024-10-10 ENCOUNTER — OFFICE VISIT (OUTPATIENT)
Dept: INTERNAL MEDICINE | Facility: CLINIC | Age: 64
End: 2024-10-10
Payer: COMMERCIAL

## 2024-10-10 VITALS
HEIGHT: 73 IN | BODY MASS INDEX: 32.87 KG/M2 | WEIGHT: 248 LBS | SYSTOLIC BLOOD PRESSURE: 145 MMHG | RESPIRATION RATE: 20 BRPM | TEMPERATURE: 98.7 F | HEART RATE: 97 BPM | DIASTOLIC BLOOD PRESSURE: 84 MMHG

## 2024-10-10 DIAGNOSIS — I10 BENIGN ESSENTIAL HYPERTENSION: ICD-10-CM

## 2024-10-10 DIAGNOSIS — M81.6 LOCALIZED OSTEOPOROSIS WITHOUT CURRENT PATHOLOGICAL FRACTURE: ICD-10-CM

## 2024-10-10 DIAGNOSIS — Z12.11 SPECIAL SCREENING FOR MALIGNANT NEOPLASMS, COLON: ICD-10-CM

## 2024-10-10 DIAGNOSIS — Z79.4 TYPE 2 DIABETES MELLITUS WITHOUT COMPLICATION, WITH LONG-TERM CURRENT USE OF INSULIN (H): Primary | ICD-10-CM

## 2024-10-10 DIAGNOSIS — S69.91XA INJURY OF RIGHT RING FINGER, INITIAL ENCOUNTER: ICD-10-CM

## 2024-10-10 DIAGNOSIS — K20.90 ESOPHAGITIS: ICD-10-CM

## 2024-10-10 DIAGNOSIS — E11.9 TYPE 2 DIABETES MELLITUS WITHOUT COMPLICATION, WITH LONG-TERM CURRENT USE OF INSULIN (H): Primary | ICD-10-CM

## 2024-10-10 LAB
ANION GAP SERPL CALCULATED.3IONS-SCNC: 12 MMOL/L (ref 7–15)
BUN SERPL-MCNC: 12.6 MG/DL (ref 8–23)
CALCIUM SERPL-MCNC: 9 MG/DL (ref 8.8–10.4)
CHLORIDE SERPL-SCNC: 106 MMOL/L (ref 98–107)
CREAT SERPL-MCNC: 1.03 MG/DL (ref 0.67–1.17)
EGFRCR SERPLBLD CKD-EPI 2021: 81 ML/MIN/1.73M2
GLUCOSE SERPL-MCNC: 128 MG/DL (ref 70–99)
HCO3 SERPL-SCNC: 23 MMOL/L (ref 22–29)
POTASSIUM SERPL-SCNC: 4.2 MMOL/L (ref 3.4–5.3)
SODIUM SERPL-SCNC: 141 MMOL/L (ref 135–145)

## 2024-10-10 PROCEDURE — 99214 OFFICE O/P EST MOD 30 MIN: CPT

## 2024-10-10 PROCEDURE — G2211 COMPLEX E/M VISIT ADD ON: HCPCS

## 2024-10-10 NOTE — PROGRESS NOTES
Assessment & Plan     (E11.9,  Z79.4) Type 2 diabetes mellitus without complication, with long-term current use of insulin (H)  (primary encounter diagnosis)  Comment: He has lost about 45lbs since starting Ozempic. He is doing great.  His A1c is under excellent control today at 5.5%.  Will continue on metformin XR 2000 mg and Ozempic 2 mg  He will see me in 6 months for follow-up  Plan: Semaglutide, 2 MG/DOSE, (OZEMPIC) 8 MG/3ML pen            (Z12.11) Special screening for malignant neoplasms, colon  Comment:   Plan: Colonoscopy Screening  Referral            (K20.90) Esophagitis  Comment:   He was recommended to see GI after his hospitalization in August of 2023 as he had episode of black stool and esophageal wall thickening was seen on chest CT. He denies any ongoing black or bloody stool since then. I do think it is worthwhile to have him see GI in case they want to proceed with endoscopy given finding of esophageal wall thickening.    He is due for colon cancer screening and I have placed order for a colonoscopy today.    There  Plan: Adult GI  Referral - Consult Only            (I10) Benign essential hypertension  Comment: He tells me that he forgot to take his blood pressure medication this morning.  He will take this when he gets home and call with a blood pressure reading  Plan:     (S69.91XA) Injury of right ring finger, initial encounter  Comment:   Plan: Orthopedic  Referral          (M81.6) Localized osteoporosis without current pathological fracture   Comment: Began treatment for osteoporosis with Fosamax in August 2024.  Will plan to treat for 5 years.          The longitudinal plan of care for the diagnosis(es)/condition(s) as documented were addressed during this visit. Due to the added complexity in care, I will continue to support Иван in the subsequent management and with ongoing continuity of care.      Subjective   Иван is a 64 year old, presenting for the  "following health issues:  Diabetes      10/10/2024     7:18 AM   Additional Questions   Roomed by May     History of Present Illness     Asthma:  He presents for follow up of asthma.  He has no cough, no wheezing, and no shortness of breath. He is not using a relief medication.  He does not miss any doses of his controller medication throughout the week. Patient is aware of the following triggers: none and dust mites. The patient has not had a visit to the Emergency Room, Urgent Care or Hospital due to asthma since the last clinic visit.     Diabetes:   He presents for follow up of diabetes.  He is checking home blood glucose a few times a week.   He checks blood glucose before meals.  Blood glucose is never over 200 and never under 70.  When his blood glucose is low, the patient is asymptomatic for confusion, blurred vision, lethargy and reports not feeling dizzy, shaky, or weak.   He has no concerns regarding his diabetes at this time.   He is not experiencing numbness or burning in feet, excessive thirst, blurry vision, weight changes or redness, sores or blisters on feet. The patient has had a diabetic eye exam in the last 12 months. Eye exam performed on 2018. Location of last eye exam [place].        He eats 2-3 servings of fruits and vegetables daily.He consumes 0 sweetened beverage(s) daily.He exercises with enough effort to increase his heart rate 60 or more minutes per day.  He exercises with enough effort to increase his heart rate 6 days per week.   He is taking medications regularly.         Hypertension Follow-up    Do you check your blood pressure regularly outside of the clinic? Yes  Are you following a low salt diet? No  Are your blood pressures ever more than 140 on the top number (systolic) OR more   than 90 on the bottom number (diastolic), for example 140/90? No              Objective    BP (!) 145/84   Pulse 97   Temp 98.7  F (37.1  C) (Tympanic)   Resp 20   Ht 1.854 m (6' 1\")   Wt " 112.5 kg (248 lb)   BMI 32.72 kg/m    Body mass index is 32.72 kg/m .      Physical Exam  Constitutional:       General: He is not in acute distress.     Appearance: Normal appearance. He is not ill-appearing, toxic-appearing or diaphoretic.   HENT:      Head: Normocephalic and atraumatic.   Eyes:      Conjunctiva/sclera: Conjunctivae normal.   Cardiovascular:      Rate and Rhythm: Normal rate and regular rhythm.      Heart sounds: Normal heart sounds.   Pulmonary:      Effort: Pulmonary effort is normal.      Breath sounds: Normal breath sounds.   Skin:     General: Skin is warm and dry.   Neurological:      Mental Status: He is alert and oriented to person, place, and time.   Psychiatric:         Mood and Affect: Mood normal.         Behavior: Behavior normal.         Thought Content: Thought content normal.         Judgment: Judgment normal.           Signed Electronically by: REVA Sanabria CNP

## 2024-10-11 ENCOUNTER — PATIENT OUTREACH (OUTPATIENT)
Dept: CARE COORDINATION | Facility: CLINIC | Age: 64
End: 2024-10-11
Payer: COMMERCIAL

## 2024-10-14 ENCOUNTER — PATIENT OUTREACH (OUTPATIENT)
Dept: CARE COORDINATION | Facility: CLINIC | Age: 64
End: 2024-10-14
Payer: COMMERCIAL

## 2024-11-05 NOTE — ED TRIAGE NOTES
[facility] brought in patient from [facility] ED due to significant back pain. Patient fell last Sunday hitting his back and has experienced an increase of unmanaged back pain since the fall.    -160 over 100s with EMS and at [facility]. Patient been off his lisinopril for 5 days due to running pout of his medication. This medication is new for him in the past few months.    Patient was given 0.5mg dilaudid at [facility] for his pain of 8/10. Post pain assessment 5/10  
If you are a smoker, it is important for your health to stop smoking. Please be aware that second hand smoke is also harmful.

## 2024-11-06 ENCOUNTER — TELEPHONE (OUTPATIENT)
Dept: GASTROENTEROLOGY | Facility: CLINIC | Age: 64
End: 2024-11-06
Payer: COMMERCIAL

## 2024-11-06 NOTE — TELEPHONE ENCOUNTER
"Endoscopy Scheduling Screen    Have you had any respiratory illness or flu-like symptoms in the last 10 days?  No    What is your communication preference for Instructions and/or Bowel Prep?   MyChart    What insurance is in the chart?  Other:  BCBS    Ordering/Referring Provider:   ALPHONSO GAR    (If ordering provider performs procedure, schedule with ordering provider unless otherwise instructed. )    BMI: Estimated body mass index is 32.72 kg/m  as calculated from the following:    Height as of 10/10/24: 1.854 m (6' 1\").    Weight as of 10/10/24: 112.5 kg (248 lb).     Sedation Ordered  moderate sedation.   If patient BMI > 50 do not schedule in [facility].    If patient BMI > 45 do not schedule at [facility].    Are you taking methadone or Suboxone?  NO, No RN review required.    Have you been diagnosed and are being treated for severe PTSD or severe anxiety?  NO, No RN review required.    Are you taking any prescription medications for pain 3 or more times per week?   NO, No RN review required.    Do you have a history of malignant hyperthermia?  No    (Females) Are you currently pregnant?   No     Have you been diagnosed or told you have pulmonary hypertension?   No    Do you have an LVAD?  No    Have you been told you have moderate to severe sleep apnea?  No.    Have you been told you have COPD, asthma, or any other lung disease?  Yes     What breathing problems do you have?  Asthma     Do you use home oxygen?  No    Have your breathing problems required an ED visit or hospitalization in the last year?  No.    Do you have any heart conditions?  No     Have you ever had or are you waiting for an organ transplant?  No. Continue scheduling, no site restrictions.    Have you had a stroke or transient ischemic attack (TIA aka \"mini stroke\" in the last 6 months?   No    Have you been diagnosed with or been told you have cirrhosis of the liver?   No.    Are you currently on dialysis?   No    Do you need assistance " "transferring?   No    BMI: Estimated body mass index is 32.72 kg/m  as calculated from the following:    Height as of 10/10/24: 1.854 m (6' 1\").    Weight as of 10/10/24: 112.5 kg (248 lb).     Is patients BMI > 40 and scheduling location [facility]?  No    Do you take an injectable or oral medication for weight loss or diabetes (excluding insulin)?  Yes, hold time can be up to 7 days. Please consult with you prescribing provider to discuss endoscopy recommendations. (Please schedule at least 7 days out.)    Do you take the medication Naltrexone?  No    Do you take blood thinners?  No       Prep   Are you currently on dialysis or do you have chronic kidney disease?  No    Do you have a diagnosis of diabetes?  Yes (Golytely Prep)    Do you have a diagnosis of cystic fibrosis (CF)?  No    On a regular basis do you go 3 -5 days between bowel movements?  No    BMI > 40?  No    [place]erred [place]    [facility] [id number] [place] [id number]  Phone: [phone] Fax: [phone]      Final Scheduling Details     Procedure scheduled  Colonoscopy    Surgeon:  Trinh     Date of procedure:  12/20     Pre-OP / PAC:   No - Not required for this site.    Location  [facility] - Patient preference.    Sedation   Moderate Sedation - Per order.      Patient Reminders:   You will receive a call from a Nurse to review instructions and health history.  This assessment must be completed prior to your procedure.  Failure to complete the Nurse assessment may result in the procedure being cancelled.      On the day of your procedure, please designate an adult(s) who can drive you home stay with you for the next 24 hours. The medicines used in the exam will make you sleepy. You will not be able to drive.      You cannot take public transportation, ride share services, or non-medical taxi service without a responsible caregiver.  Medical transport services are allowed with the " requirement that a responsible caregiver will receive you at your destination.  We require that drivers and caregivers are confirmed prior to your procedure.

## 2024-12-16 ENCOUNTER — TELEPHONE (OUTPATIENT)
Dept: GASTROENTEROLOGY | Facility: CLINIC | Age: 64
End: 2024-12-16
Payer: COMMERCIAL

## 2024-12-16 NOTE — TELEPHONE ENCOUNTER
Patient calls today with several questions about prep.  Prep was discussed with patient and reviewed over the phone.  Patient requested prep be sent over [employer], prep has already been sent, patient informed.     Location verified with patient.    Malika Mohr RN

## 2024-12-20 ENCOUNTER — HOSPITAL ENCOUNTER (OUTPATIENT)
Facility: CLINIC | Age: 64
Discharge: HOME | End: 2024-12-20
Attending: INTERNAL MEDICINE | Admitting: INTERNAL MEDICINE
Payer: COMMERCIAL

## 2024-12-20 ENCOUNTER — TELEPHONE (OUTPATIENT)
Dept: INTERNAL MEDICINE | Facility: CLINIC | Age: 64
End: 2024-12-20

## 2024-12-20 VITALS
DIASTOLIC BLOOD PRESSURE: 89 MMHG | SYSTOLIC BLOOD PRESSURE: 142 MMHG | HEART RATE: 90 BPM | HEIGHT: 73 IN | OXYGEN SATURATION: 97 % | RESPIRATION RATE: 16 BRPM | WEIGHT: 244 LBS | BODY MASS INDEX: 32.34 KG/M2

## 2024-12-20 LAB
COLONOSCOPY: NORMAL
GLUCOSE BLDC GLUCOMTR-MCNC: 100 MG/DL (ref 70–99)

## 2024-12-20 PROCEDURE — 45385 COLONOSCOPY W/LESION REMOVAL: CPT | Performed by: INTERNAL MEDICINE

## 2024-12-20 PROCEDURE — 88305 TISSUE EXAM BY PATHOLOGIST: CPT | Mod: TC | Performed by: INTERNAL MEDICINE

## 2024-12-20 PROCEDURE — 250N000011 HC RX IP 250 OP 636: Performed by: INTERNAL MEDICINE

## 2024-12-20 PROCEDURE — G0500 MOD SEDAT ENDO SERVICE >5YRS: HCPCS | Performed by: INTERNAL MEDICINE

## 2024-12-20 PROCEDURE — 82962 GLUCOSE BLOOD TEST: CPT

## 2024-12-20 PROCEDURE — 88305 TISSUE EXAM BY PATHOLOGIST: CPT | Mod: 26 | Performed by: PATHOLOGY

## 2024-12-20 PROCEDURE — 45380 COLONOSCOPY AND BIOPSY: CPT | Mod: PT,XU | Performed by: INTERNAL MEDICINE

## 2024-12-20 RX ORDER — NALOXONE HYDROCHLORIDE 0.4 MG/ML
0.2 INJECTION, SOLUTION INTRAMUSCULAR; INTRAVENOUS; SUBCUTANEOUS
Status: DISCONTINUED | OUTPATIENT
Start: 2024-12-20 | End: 2024-12-20 | Stop reason: HOSPADM

## 2024-12-20 RX ORDER — FLUMAZENIL 0.1 MG/ML
0.2 INJECTION, SOLUTION INTRAVENOUS
Status: DISCONTINUED | OUTPATIENT
Start: 2024-12-20 | End: 2024-12-20 | Stop reason: HOSPADM

## 2024-12-20 RX ORDER — DIPHENHYDRAMINE HYDROCHLORIDE 50 MG/ML
25-50 INJECTION INTRAMUSCULAR; INTRAVENOUS
Status: DISCONTINUED | OUTPATIENT
Start: 2024-12-20 | End: 2024-12-20 | Stop reason: HOSPADM

## 2024-12-20 RX ORDER — NALOXONE HYDROCHLORIDE 0.4 MG/ML
0.4 INJECTION, SOLUTION INTRAMUSCULAR; INTRAVENOUS; SUBCUTANEOUS
Status: DISCONTINUED | OUTPATIENT
Start: 2024-12-20 | End: 2024-12-20 | Stop reason: HOSPADM

## 2024-12-20 RX ORDER — ATROPINE SULFATE 0.1 MG/ML
1 INJECTION INTRAVENOUS
Status: DISCONTINUED | OUTPATIENT
Start: 2024-12-20 | End: 2024-12-20 | Stop reason: HOSPADM

## 2024-12-20 RX ORDER — LIDOCAINE 40 MG/G
CREAM TOPICAL
Status: DISCONTINUED | OUTPATIENT
Start: 2024-12-20 | End: 2024-12-20 | Stop reason: HOSPADM

## 2024-12-20 RX ORDER — ONDANSETRON 2 MG/ML
4 INJECTION INTRAMUSCULAR; INTRAVENOUS EVERY 6 HOURS PRN
Status: DISCONTINUED | OUTPATIENT
Start: 2024-12-20 | End: 2024-12-20 | Stop reason: HOSPADM

## 2024-12-20 RX ORDER — ONDANSETRON 4 MG/1
4 TABLET, ORALLY DISINTEGRATING ORAL EVERY 6 HOURS PRN
Status: DISCONTINUED | OUTPATIENT
Start: 2024-12-20 | End: 2024-12-20 | Stop reason: HOSPADM

## 2024-12-20 RX ORDER — PROCHLORPERAZINE MALEATE 5 MG/1
5 TABLET ORAL EVERY 6 HOURS PRN
Status: DISCONTINUED | OUTPATIENT
Start: 2024-12-20 | End: 2024-12-20 | Stop reason: HOSPADM

## 2024-12-20 RX ORDER — SIMETHICONE 40MG/0.6ML
133 SUSPENSION, DROPS(FINAL DOSAGE FORM)(ML) ORAL
Status: DISCONTINUED | OUTPATIENT
Start: 2024-12-20 | End: 2024-12-20 | Stop reason: HOSPADM

## 2024-12-20 RX ORDER — ONDANSETRON 2 MG/ML
4 INJECTION INTRAMUSCULAR; INTRAVENOUS
Status: DISCONTINUED | OUTPATIENT
Start: 2024-12-20 | End: 2024-12-20 | Stop reason: HOSPADM

## 2024-12-20 RX ORDER — FENTANYL CITRATE 50 UG/ML
50-100 INJECTION, SOLUTION INTRAMUSCULAR; INTRAVENOUS EVERY 5 MIN PRN
Status: DISCONTINUED | OUTPATIENT
Start: 2024-12-20 | End: 2024-12-20 | Stop reason: HOSPADM

## 2024-12-20 RX ORDER — EPINEPHRINE 1 MG/ML
0.1 INJECTION, SOLUTION, CONCENTRATE INTRAVENOUS
Status: DISCONTINUED | OUTPATIENT
Start: 2024-12-20 | End: 2024-12-20 | Stop reason: HOSPADM

## 2024-12-20 RX ADMIN — FENTANYL CITRATE 50 MCG: 50 INJECTION, SOLUTION INTRAMUSCULAR; INTRAVENOUS at 08:12

## 2024-12-20 RX ADMIN — MIDAZOLAM 2 MG: 1 INJECTION INTRAMUSCULAR; INTRAVENOUS at 08:15

## 2024-12-20 RX ADMIN — MIDAZOLAM 2 MG: 1 INJECTION INTRAMUSCULAR; INTRAVENOUS at 08:19

## 2024-12-20 RX ADMIN — FENTANYL CITRATE 50 MCG: 50 INJECTION, SOLUTION INTRAMUSCULAR; INTRAVENOUS at 08:15

## 2024-12-20 RX ADMIN — MIDAZOLAM 2 MG: 1 INJECTION INTRAMUSCULAR; INTRAVENOUS at 08:12

## 2024-12-20 ASSESSMENT — ACTIVITIES OF DAILY LIVING (ADL)
ADLS_ACUITY_SCORE: 57
ADLS_ACUITY_SCORE: 57

## 2024-12-20 NOTE — TELEPHONE ENCOUNTER
Pt calling to say 'Thanks for pushing him to get a colonoscopy, he did it and everything is okay'

## 2024-12-20 NOTE — H&P
[facility]  Pre-Endoscopy History and Physical     Kalen Callejas MRN# [id number]   YOB: 1960 Age: 64 year old     Date of Procedure: 12/20/2024  Primary care provider: Santa Larkin  Type of Endoscopy: colonoscopy  Reason for Procedure: screening  Type of Anesthesia Anticipated: Moderate Sedation    HPI:    Kalen is a 64 year old male who will be undergoing the above procedure.      A history and physical has been performed. The patient's medications and allergies have been reviewed. The risks and benefits of the procedure and the sedation options and risks were discussed with the patient.  All questions were answered and informed consent was obtained.      He denies a personal or family history of anesthesia complications or bleeding disorders.     Allergies   Allergen Reactions    Bee Venom         Prior to Admission Medications   Prescriptions Last Dose Informant Patient Reported? Taking?   Alcohol Swabs PADS   No No   Sig: Use to swab the area of the injection or benoit as directed Per insurance coverage   Semaglutide, 2 MG/DOSE, (OZEMPIC) 8 MG/3ML pen Past Week  No Yes   Sig: Inject 2 mg subcutaneously every 7 days.   albuterol (PROAIR HFA/PROVENTIL HFA/VENTOLIN HFA) 108 (90 Base) MCG/ACT inhaler More than a month  Yes Yes   Sig: Inhale 2 puffs into the lungs every 4 hours as needed   alendronate (FOSAMAX) 70 MG tablet 12/20/2024 Morning  No Yes   Sig: Take 1 tablet (70 mg) by mouth every 7 days.   atorvastatin (LIPITOR) 10 MG tablet 12/20/2024 Morning  No Yes   Sig: Take 1 tablet (10 mg) by mouth daily   bisacodyl (DULCOLAX) 5 MG EC tablet   No No   Sig: Two days prior to exam take two (2) tablets at 4pm. One day prior to exam take two (2) tablets at 4pm   blood glucose (NO BRAND SPECIFIED) lancets standard   No No   Sig: To use to test glucose level in the blood Use to test blood sugar 5 times daily as directed. To accompany glucose monitor brands per insurance  coverage.   blood glucose (NO BRAND SPECIFIED) test strip   No No   Sig: To use to test glucose level in the blood Use to test blood sugar 5 times daily as directed. To accompany glucose monitor brands per insurance coverage.   blood glucose calibration (NO BRAND SPECIFIED) solution   No No   Sig: Used to calibrate the blood glucose monitor as needed and as directed.  To accompany  blood glucose brands per insurance coverage   blood glucose monitoring (NO BRAND SPECIFIED) meter device kit   No No   Sig: Check blood sugar 3 times a day   budesonide (PULMICORT FLEXHALER) 180 MCG/ACT inhaler 12/20/2024 Morning  Yes Yes   Sig: Inhale 2 puffs into the lungs 2 times daily   fluticasone (FLONASE) 50 MCG/ACT nasal spray 12/20/2024 Morning  Yes Yes   Sig: Spray 2 sprays into both nostrils daily   losartan (COZAAR) 100 MG tablet 12/20/2024 Morning  No Yes   Sig: Take 1 tablet (100 mg) by mouth daily   metFORMIN (GLUCOPHAGE XR) 500 MG 24 hr tablet 12/19/2024  No Yes   Sig: Take 2 tablets (1,000 mg) by mouth 2 times daily (with meals)   polyethylene glycol (GOLYTELY) 236 g suspension   No No   Sig: Two days before procedure at 5PM fill first container with water. Mix and drink an 8 oz glass every 15 minutes until HALF of the container is gone. Place the remainder in the refrigerator. One day before procedure at 5PM drink second half of bowel prep. Drink an 8 oz glass every 15 minutes until it is gone. Day of procedure 6 hours before arrival time fill the 2nd container with water. Mix and drink an 8 oz glass every 15 minutes until HALF of the container is gone. Discard the remaining solution.      Facility-Administered Medications: None       Patient Active Problem List   Diagnosis    T12 burst fracture (H)    Diabetes mellitus, type 2 (H)    DKA (diabetic ketoacidosis) (H)    Diabetic ketoacidosis without coma associated with type 2 diabetes mellitus (H)    Class 2 severe obesity due to excess calories with serious comorbidity  "in adult (H)        Past Medical History:   Diagnosis Date    Arthritis 2000    Cancer (H) 2018    skin on my nose    Diabetes (H) 2023    Hypertension 2018    Uncomplicated asthma 1980        Past Surgical History:   Procedure Laterality Date    COLONOSCOPY  2005    COSMETIC SURGERY  2018    nose    FUSION SPINE POSTERIOR THORACIC TWO LEVELS N/A 06/13/2022    Procedure: THORACIC 10 LUMBAR 2, POSTERIOR INSTRUMENTED FUSION, THORACIC 12 LAMINECTOMY, POSS PERCUTANEOUS SCREWS, POSS ADDITIONAL LEVELS WITH O-arm/Stealth GUIDANCE;  Surgeon: Sulaiman Akins MD;  Location: [facility] OR    ORTHOPEDIC SURGERY  2018    both knees       Social History     Tobacco Use    Smoking status: Never    Smokeless tobacco: Never   Substance Use Topics    Alcohol use: Yes     Comment: sometimes, rare       Family History   Problem Relation Age of Onset    Colon Cancer Brother        REVIEW OF SYSTEMS:     5 point ROS negative except as noted above in HPI, including Gen., Resp., CV, GI &  system review.      PHYSICAL EXAM:   BP (!) 155/99   Pulse 103   Resp 12   Ht 1.854 m (6' 1\")   Wt 110.7 kg (244 lb)   SpO2 99%   BMI 32.19 kg/m   Estimated body mass index is 32.19 kg/m  as calculated from the following:    Height as of this encounter: 1.854 m (6' 1\").    Weight as of this encounter: 110.7 kg (244 lb).   GENERAL APPEARANCE: healthy, alert, and no distress  MENTAL STATUS: alert  AIRWAY EXAM: Mallampatti Class II (visualization of the soft palate, fauces, and uvula)  RESP: lungs clear to auscultation - no rales, rhonchi or wheezes  CV: regular rates and rhythm      DIAGNOSTICS:    Not indicated      IMPRESSION   ASA Class 2 - Mild systemic disease        PLAN:       Plan for colonoscopy. We discussed the risks, benefits and alternatives and the patient wished to proceed.    The above has been forwarded to the consulting provider.      Signed Electronically by: Chico Tsang MD  December 20, 2024    Chico Tsang MD  [facility]  Cell: [phone]  Office Phone: [phone]  Office Fax: [phone]

## 2024-12-23 LAB
PATH REPORT.COMMENTS IMP SPEC: NORMAL
PATH REPORT.COMMENTS IMP SPEC: NORMAL
PATH REPORT.FINAL DX SPEC: NORMAL
PATH REPORT.GROSS SPEC: NORMAL
PATH REPORT.MICROSCOPIC SPEC OTHER STN: NORMAL
PATH REPORT.RELEVANT HX SPEC: NORMAL
PHOTO IMAGE: NORMAL

## 2025-01-03 PROBLEM — Z80.0 FAMILY HISTORY OF COLORECTAL CANCER: Status: ACTIVE | Noted: 2025-01-03

## 2025-01-03 PROBLEM — D12.6 ADENOMATOUS POLYP OF COLON: Status: ACTIVE | Noted: 2025-01-03

## 2025-01-06 ENCOUNTER — PATIENT OUTREACH (OUTPATIENT)
Dept: GASTROENTEROLOGY | Facility: CLINIC | Age: 65
End: 2025-01-06
Payer: COMMERCIAL

## 2025-01-08 ENCOUNTER — OFFICE VISIT (OUTPATIENT)
Dept: ORTHOPEDICS | Facility: CLINIC | Age: 65
End: 2025-01-08
Payer: COMMERCIAL

## 2025-01-08 ENCOUNTER — ANCILLARY PROCEDURE (OUTPATIENT)
Dept: GENERAL RADIOLOGY | Facility: CLINIC | Age: 65
End: 2025-01-08
Attending: FAMILY MEDICINE
Payer: COMMERCIAL

## 2025-01-08 VITALS — HEIGHT: 73 IN | WEIGHT: 258 LBS | BODY MASS INDEX: 34.19 KG/M2

## 2025-01-08 DIAGNOSIS — S69.91XA INJURY OF RIGHT RING FINGER, INITIAL ENCOUNTER: ICD-10-CM

## 2025-01-08 PROCEDURE — G2211 COMPLEX E/M VISIT ADD ON: HCPCS | Performed by: FAMILY MEDICINE

## 2025-01-08 PROCEDURE — 73140 X-RAY EXAM OF FINGER(S): CPT | Mod: RT | Performed by: FAMILY MEDICINE

## 2025-01-08 PROCEDURE — 99204 OFFICE O/P NEW MOD 45 MIN: CPT | Performed by: FAMILY MEDICINE

## 2025-01-08 RX ORDER — MELOXICAM 15 MG/1
15 TABLET ORAL DAILY
Qty: 30 TABLET | Refills: 0 | Status: SHIPPED | OUTPATIENT
Start: 2025-01-08

## 2025-01-08 NOTE — PROGRESS NOTES
ASSESSMENT & PLAN  Patient Instructions     1. Injury of right ring finger, initial encounter      -Patient has acute right distal ring finger pain due to recent trauma while working  -Patient has a history of a right fourth DIP open dislocation and reduction 4 years ago.  Patient reports sensitivity of his distal phalanx since that time and likely due to nerve hyperesthesia after the trauma  -X-rays taken in office today of the right hand shows mild degenerative changes of the fourth DIP joint  -Patient has mild tenderness palpation over the tip of the fourth digit and mild swelling  -Patient will start meloxicam 15 mg daily for the next 2 to 3 weeks to decrease inflammation and sensitivity  -Patient to continue working without restrictions  -Patient will continue to follow-up with me for further treatment and recommendations  -Call direct clinic number [[phone]] at any time with questions or concerns.    Albert Yeo MD [facility]  [facility]        -----    SUBJECTIVE  Kalen Callejas is a/an 64 year old Left handed male who is seen in consultation at the request of  Santa ARDON C.N.P. for evaluation of right ring finger right  hand pain. The patient is seen by themselves.    Onset: 4 years(s) ago. Patient describes injury as dislocated finger when fell onto finger. Patient was evaluated in the ED at time of injury, but never followed up afterwards.  Location of Pain: right ring finger  Rating of Pain at worst: 7/10  Rating of Pain Currently: 1/10  Worsened by: bumping finger  Better with: be careful not to bump it  Treatments tried: no treatment tried to date  Associated symptoms: no distal numbness or tingling; denies swelling or warmth  Orthopedic history: YES - Date: carpel tunnel  Relevant surgical history: YES - Date: right carpal tunnel release 20 years ago, bilateral knee replacements  Social history: social history: works at [employer]      Past Medical History:   Diagnosis Date    Arthritis 2000    Cancer (H) 2018    skin on my nose    Diabetes (H) 2023    Hypertension 2018    Uncomplicated asthma 1980     Social History     Socioeconomic History    Marital status:    Tobacco Use    Smoking status: Never    Smokeless tobacco: Never   Vaping Use    Vaping status: Never Used   Substance and Sexual Activity    Alcohol use: Yes     Comment: sometimes, rare    Drug use: Never    Sexual activity: Yes     Partners: Female     Birth control/protection: None     Comment: with wife   Other Topics Concern    Parent/sibling w/ CABG, MI or angioplasty before 65F 55M? No     Social Drivers of Health     Financial Resource Strain: Low Risk  (2/11/2024)    Financial Resource Strain     Within the past 12 months, have you or your family members you live with been unable to get utilities (heat, electricity) when it was really needed?: No   Food Insecurity: Low Risk  (2/11/2024)    Food Insecurity     Within the past 12 months, did you worry that your food would run out before you got money to buy more?: No     Within the past 12 months, did the food you bought just not last and you didn t have money to get more?: No   Transportation Needs: Low Risk  (2/11/2024)    Transportation Needs     Within the past 12 months, has lack of transportation kept you from medical appointments, getting your medicines, non-medical meetings or appointments, work, or from getting things that you need?: No   Physical Activity: Sufficiently Active (2/11/2024)    Exercise Vital Sign     Days of Exercise per Week: 3 days     Minutes of Exercise per Session: 50 min   Stress: No Stress Concern Present (2/11/2024)    [place] [employer] - Occupational Stress Questionnaire     Feeling of Stress : Only a little   Social Connections: Unknown (2/11/2024)    Social Connection and Isolation Panel [NHANES]     Frequency of Social Gatherings with Friends and Family: Twice  "a week   Interpersonal Safety: Low Risk  (12/20/2024)    Interpersonal Safety     Do you feel physically and emotionally safe where you currently live?: Yes     Within the past 12 months, have you been hit, slapped, kicked or otherwise physically hurt by someone?: No     Within the past 12 months, have you been humiliated or emotionally abused in other ways by your partner or ex-partner?: No   Housing Stability: Low Risk  (2/11/2024)    Housing Stability     Do you have housing? : Yes     Are you worried about losing your housing?: No   Recent Concern: Housing Stability - High Risk (11/29/2023)    Housing Stability     Do you have housing? : Yes     Are you worried about losing your housing?: Yes         Patient's past medical, surgical, social, and family histories were reviewed today and no changes are noted.    REVIEW OF SYSTEMS:  10 point ROS is negative other than symptoms noted above in HPI, Past Medical History or as stated below  Constitutional: NEGATIVE for fever, chills, change in weight  Skin: NEGATIVE for worrisome rashes, moles or lesions  GI/: NEGATIVE for bowel or bladder changes  Neuro: NEGATIVE for weakness, dizziness or paresthesias    OBJECTIVE:  Ht 1.854 m (6' 1\")   Wt 117 kg (258 lb)   BMI 34.04 kg/m     General: healthy, alert and in no distress  HEENT: no scleral icterus or conjunctival erythema  Skin: no suspicious lesions or rash. No jaundice.  CV: regular rhythm by palpation  Resp: normal respiratory effort without conversational dyspnea   Psych: normal mood and affect  Gait: normal steady gait with appropriate coordination and balance  Neuro: normal light touch sensory exam of the bilateral hands.    MSK:  RIGHT HAND  Inspection:    MIld swelling over distal ring finger   Palpation:   Carpals: normal   Metacarpals: normal   Thumb: normal   Fingers: distal phalanx  Range of Motion:    flexion DIP limited by tightness  Strength:    extension within normal limits, flexion within normal " limits  Special Tests:    Positive: none    Negative: flexor digitorum superficialis testing, flexor digitorum profundus testing    Independent visualization of the below image:  Recent Results (from the past 24 hours)   XR Finger Right G/E 2 Views    Narrative    Mild degenerative changes of the fourth DIP joint small ossified loose   body dorsal to the distal phalanx likely from her previous avulsion   fracture.  No acute fracture or dislocation.       Study Result    Narrative & Impression   EXAM: XR FINGER RT G/E 2 VW  LOCATION: [facility]  DATE/TIME: 9/4/2020 7:41 PM     INDICATION: Deformity after fall  COMPARISON: None.                                                                      IMPRESSION: Ring finger distal interphalangeal joint dislocation. The head of the middle phalanx may be exposed through the skin.            Albert Yeo MD CAQSM  [facility]

## 2025-01-08 NOTE — PATIENT INSTRUCTIONS
1. Injury of right ring finger, initial encounter      -Patient has acute right distal ring finger pain due to recent trauma while working  -Patient has a history of a right fourth DIP open dislocation and reduction 4 years ago.  Patient reports sensitivity of his distal phalanx since that time and likely due to nerve hyperesthesia after the trauma  -X-rays taken in office today of the right hand shows mild degenerative changes of the fourth DIP joint  -Patient has mild tenderness palpation over the tip of the fourth digit and mild swelling  -Patient will start meloxicam 15 mg daily for the next 2 to 3 weeks to decrease inflammation and sensitivity  -Patient to continue working without restrictions  -Patient will continue to follow-up with me for further treatment and recommendations  -Call direct clinic number [[phone]] at any time with questions or concerns.    Albert Yeo MD [facility]  [facility] [place]

## 2025-01-08 NOTE — LETTER
1/8/2025      Kalen Callejas  [address] [address]      Dear Colleague,    Thank you for referring your patient, Kalen Callejas, to the [facility] [place]. Please see a copy of my visit note below.    ASSESSMENT & PLAN  Patient Instructions     1. Injury of right ring finger, initial encounter      -Patient has acute right distal ring finger pain due to recent trauma while working  -Patient has a history of a right fourth DIP open dislocation and reduction 4 years ago.  Patient reports sensitivity of his distal phalanx since that time and likely due to nerve hyperesthesia after the trauma  -X-rays taken in office today of the right hand shows mild degenerative changes of the fourth DIP joint  -Patient has mild tenderness palpation over the tip of the fourth digit and mild swelling  -Patient will start meloxicam 15 mg daily for the next 2 to 3 weeks to decrease inflammation and sensitivity  -Patient to continue working without restrictions  -Patient will continue to follow-up with me for further treatment and recommendations  -Call direct clinic number [[phone]] at any time with questions or concerns.    Albert Yeo MD [facility]  [facility]        -----    SUBJECTIVE  Kalen Callejas is a/an 64 year old Left handed male who is seen in consultation at the request of  Santa ARDON C.N.P. for evaluation of right ring finger right  hand pain. The patient is seen by themselves.    Onset: 4 years(s) ago. Patient describes injury as dislocated finger when fell onto finger. Patient was evaluated in the ED at time of injury, but never followed up afterwards.  Location of Pain: right ring finger  Rating of Pain at worst: 7/10  Rating of Pain Currently: 1/10  Worsened by: bumping finger  Better with: be careful not to bump it  Treatments tried: no treatment tried to date  Associated symptoms: no distal  numbness or tingling; denies swelling or warmth  Orthopedic history: YES - Date: carpel tunnel  Relevant surgical history: YES - Date: right carpal tunnel release 20 years ago, bilateral knee replacements  Social history: social history: works at [employer]     Past Medical History:   Diagnosis Date     Arthritis 2000     Cancer (H) 2018    skin on my nose     Diabetes (H) 2023     Hypertension 2018     Uncomplicated asthma 1980     Social History     Socioeconomic History     Marital status:    Tobacco Use     Smoking status: Never     Smokeless tobacco: Never   Vaping Use     Vaping status: Never Used   Substance and Sexual Activity     Alcohol use: Yes     Comment: sometimes, rare     Drug use: Never     Sexual activity: Yes     Partners: Female     Birth control/protection: None     Comment: with wife   Other Topics Concern     Parent/sibling w/ CABG, MI or angioplasty before 65F 55M? No     Social Drivers of Health     Financial Resource Strain: Low Risk  (2/11/2024)    Financial Resource Strain      Within the past 12 months, have you or your family members you live with been unable to get utilities (heat, electricity) when it was really needed?: No   Food Insecurity: Low Risk  (2/11/2024)    Food Insecurity      Within the past 12 months, did you worry that your food would run out before you got money to buy more?: No      Within the past 12 months, did the food you bought just not last and you didn t have money to get more?: No   Transportation Needs: Low Risk  (2/11/2024)    Transportation Needs      Within the past 12 months, has lack of transportation kept you from medical appointments, getting your medicines, non-medical meetings or appointments, work, or from getting things that you need?: No   Physical Activity: Sufficiently Active (2/11/2024)    Exercise Vital Sign      Days of Exercise per Week: 3 days      Minutes of Exercise per Session: 50 min   Stress: No Stress Concern Present (2/11/2024)  "   [place] of [employer]ational [employer] - Occupational Stress Questionnaire      Feeling of Stress : Only a little   Social Connections: Unknown (2/11/2024)    Social Connection and Isolation Panel [NHANES]      Frequency of Social Gatherings with Friends and Family: Twice a week   Interpersonal Safety: Low Risk  (12/20/2024)    Interpersonal Safety      Do you feel physically and emotionally safe where you currently live?: Yes      Within the past 12 months, have you been hit, slapped, kicked or otherwise physically hurt by someone?: No      Within the past 12 months, have you been humiliated or emotionally abused in other ways by your partner or ex-partner?: No   Housing Stability: Low Risk  (2/11/2024)    Housing Stability      Do you have housing? : Yes      Are you worried about losing your housing?: No   Recent Concern: Housing Stability - High Risk (11/29/2023)    Housing Stability      Do you have housing? : Yes      Are you worried about losing your housing?: Yes         Patient's past medical, surgical, social, and family histories were reviewed today and no changes are noted.    REVIEW OF SYSTEMS:  10 point ROS is negative other than symptoms noted above in HPI, Past Medical History or as stated below  Constitutional: NEGATIVE for fever, chills, change in weight  Skin: NEGATIVE for worrisome rashes, moles or lesions  GI/: NEGATIVE for bowel or bladder changes  Neuro: NEGATIVE for weakness, dizziness or paresthesias    OBJECTIVE:  Ht 1.854 m (6' 1\")   Wt 117 kg (258 lb)   BMI 34.04 kg/m     General: healthy, alert and in no distress  HEENT: no scleral icterus or conjunctival erythema  Skin: no suspicious lesions or rash. No jaundice.  CV: regular rhythm by palpation  Resp: normal respiratory effort without conversational dyspnea   Psych: normal mood and affect  Gait: normal steady gait with appropriate coordination and balance  Neuro: normal light touch sensory exam of the bilateral hands.  "   MSK:  RIGHT HAND  Inspection:    MIld swelling over distal ring finger   Palpation:   Carpals: normal   Metacarpals: normal   Thumb: normal   Fingers: distal phalanx  Range of Motion:    flexion DIP limited by tightness  Strength:    extension within normal limits, flexion within normal limits  Special Tests:    Positive: none    Negative: flexor digitorum superficialis testing, flexor digitorum profundus testing    Independent visualization of the below image:  Recent Results (from the past 24 hours)   XR Finger Right G/E 2 Views    Narrative    Mild degenerative changes of the fourth DIP joint small ossified loose   body dorsal to the distal phalanx likely from her previous avulsion   fracture.  No acute fracture or dislocation.       Study Result    Narrative & Impression   EXAM: XR FINGER RT G/E 2 VW  LOCATION: [facility]  DATE/TIME: 9/4/2020 7:41 PM     INDICATION: Deformity after fall  COMPARISON: None.                                                                      IMPRESSION: Ring finger distal interphalangeal joint dislocation. The head of the middle phalanx may be exposed through the skin.            Albert Yeo MD [facility]      Again, thank you for allowing me to participate in the care of your patient.        Sincerely,        Albert Yeo, MD    Electronically signed

## 2025-01-26 ENCOUNTER — HEALTH MAINTENANCE LETTER (OUTPATIENT)
Age: 65
End: 2025-01-26

## 2025-03-22 ENCOUNTER — HEALTH MAINTENANCE LETTER (OUTPATIENT)
Age: 65
End: 2025-03-22

## 2025-03-22 ENCOUNTER — TELEPHONE (OUTPATIENT)
Dept: INTERNAL MEDICINE | Facility: CLINIC | Age: 65
End: 2025-03-22
Payer: COMMERCIAL

## 2025-03-22 DIAGNOSIS — E11.9 TYPE 2 DIABETES MELLITUS WITHOUT COMPLICATION, WITH LONG-TERM CURRENT USE OF INSULIN (H): ICD-10-CM

## 2025-03-22 DIAGNOSIS — Z79.4 TYPE 2 DIABETES MELLITUS WITHOUT COMPLICATION, WITH LONG-TERM CURRENT USE OF INSULIN (H): ICD-10-CM

## 2025-03-22 NOTE — TELEPHONE ENCOUNTER
Prior authorization required  Please contact [facility] Federal insurance at [phone]  ID# [id number]  GR: [id number]  NDC# [id number]  Qty: 9   for a 84 day supply     Thank you,  Lindsey Jasso [facility]  [phone]

## 2025-03-25 NOTE — TELEPHONE ENCOUNTER
Retail Pharmacy Prior Authorization Team  Phone: [phone]    Prior Authorization Approval    Medication: OZEMPIC (0.25 OR 0.5 MG/DOSE) 2 MG/3ML SC SOPN  Authorization Effective Date: 2/23/2025  Authorization Expiration Date: 3/23/2025  Approved Dose/Quantity:   Reference #:     Insurance Company: [employer] - Phone [phone] Fax [phone]  Expected CoPay: $    CoPay Card Available:      Financial Assistance Needed:   Which Pharmacy is filling the prescription:    Pharmacy Notified: YES  Patient Notified: PHARMACY WILL NOTIFY PT WHEN READY

## 2025-04-08 ENCOUNTER — LAB (OUTPATIENT)
Dept: LAB | Facility: CLINIC | Age: 65
End: 2025-04-08
Payer: COMMERCIAL

## 2025-04-08 DIAGNOSIS — E11.9 TYPE 2 DIABETES MELLITUS WITHOUT COMPLICATION, WITH LONG-TERM CURRENT USE OF INSULIN (H): ICD-10-CM

## 2025-04-08 DIAGNOSIS — Z79.4 TYPE 2 DIABETES MELLITUS WITHOUT COMPLICATION, WITH LONG-TERM CURRENT USE OF INSULIN (H): ICD-10-CM

## 2025-04-08 LAB
ANION GAP SERPL CALCULATED.3IONS-SCNC: 9 MMOL/L (ref 7–15)
BUN SERPL-MCNC: 16.1 MG/DL (ref 8–23)
CALCIUM SERPL-MCNC: 10.2 MG/DL (ref 8.8–10.4)
CHLORIDE SERPL-SCNC: 102 MMOL/L (ref 98–107)
CHOLEST SERPL-MCNC: 129 MG/DL
CREAT SERPL-MCNC: 0.97 MG/DL (ref 0.67–1.17)
CREAT UR-MCNC: 26.9 MG/DL
EGFRCR SERPLBLD CKD-EPI 2021: 86 ML/MIN/1.73M2
EST. AVERAGE GLUCOSE BLD GHB EST-MCNC: 120 MG/DL
FASTING STATUS PATIENT QL REPORTED: YES
FASTING STATUS PATIENT QL REPORTED: YES
GLUCOSE SERPL-MCNC: 110 MG/DL (ref 70–99)
HBA1C MFR BLD: 5.8 % (ref 0–5.6)
HCO3 SERPL-SCNC: 26 MMOL/L (ref 22–29)
HDLC SERPL-MCNC: 49 MG/DL
LDLC SERPL CALC-MCNC: 70 MG/DL
MICROALBUMIN UR-MCNC: <12 MG/L
MICROALBUMIN/CREAT UR: NORMAL MG/G{CREAT}
NONHDLC SERPL-MCNC: 80 MG/DL
POTASSIUM SERPL-SCNC: 4.4 MMOL/L (ref 3.4–5.3)
SODIUM SERPL-SCNC: 137 MMOL/L (ref 135–145)
TRIGL SERPL-MCNC: 52 MG/DL

## 2025-04-08 PROCEDURE — 80048 BASIC METABOLIC PNL TOTAL CA: CPT

## 2025-04-08 PROCEDURE — 83036 HEMOGLOBIN GLYCOSYLATED A1C: CPT

## 2025-04-08 PROCEDURE — 82570 ASSAY OF URINE CREATININE: CPT

## 2025-04-08 PROCEDURE — 82043 UR ALBUMIN QUANTITATIVE: CPT

## 2025-04-08 PROCEDURE — 80061 LIPID PANEL: CPT

## 2025-04-08 PROCEDURE — 36415 COLL VENOUS BLD VENIPUNCTURE: CPT

## 2025-04-17 ENCOUNTER — OFFICE VISIT (OUTPATIENT)
Dept: INTERNAL MEDICINE | Facility: CLINIC | Age: 65
End: 2025-04-17
Payer: COMMERCIAL

## 2025-04-17 VITALS
DIASTOLIC BLOOD PRESSURE: 82 MMHG | HEIGHT: 73 IN | WEIGHT: 254.3 LBS | SYSTOLIC BLOOD PRESSURE: 128 MMHG | HEART RATE: 120 BPM | RESPIRATION RATE: 16 BRPM | TEMPERATURE: 98.4 F | BODY MASS INDEX: 33.7 KG/M2 | OXYGEN SATURATION: 97 %

## 2025-04-17 DIAGNOSIS — Z79.4 TYPE 2 DIABETES MELLITUS WITHOUT COMPLICATION, WITH LONG-TERM CURRENT USE OF INSULIN (H): Primary | ICD-10-CM

## 2025-04-17 DIAGNOSIS — E11.9 TYPE 2 DIABETES MELLITUS WITHOUT COMPLICATION, WITH LONG-TERM CURRENT USE OF INSULIN (H): Primary | ICD-10-CM

## 2025-04-17 DIAGNOSIS — E78.5 HYPERLIPIDEMIA LDL GOAL <100: ICD-10-CM

## 2025-04-17 DIAGNOSIS — M81.6 LOCALIZED OSTEOPOROSIS WITHOUT CURRENT PATHOLOGICAL FRACTURE: ICD-10-CM

## 2025-04-17 DIAGNOSIS — I10 BENIGN ESSENTIAL HYPERTENSION: ICD-10-CM

## 2025-04-17 DIAGNOSIS — J45.901 MODERATE ASTHMA WITH ACUTE EXACERBATION, UNSPECIFIED WHETHER PERSISTENT: ICD-10-CM

## 2025-04-17 RX ORDER — ALENDRONATE SODIUM 70 MG/1
70 TABLET ORAL
Qty: 13 TABLET | Refills: 3 | Status: SHIPPED | OUTPATIENT
Start: 2025-04-17

## 2025-04-17 RX ORDER — METFORMIN HYDROCHLORIDE 500 MG/1
1000 TABLET, EXTENDED RELEASE ORAL 2 TIMES DAILY WITH MEALS
Qty: 360 TABLET | Refills: 3 | Status: SHIPPED | OUTPATIENT
Start: 2025-04-17

## 2025-04-17 RX ORDER — LOSARTAN POTASSIUM 100 MG/1
100 TABLET ORAL DAILY
Qty: 90 TABLET | Refills: 3 | Status: SHIPPED | OUTPATIENT
Start: 2025-04-17

## 2025-04-17 RX ORDER — PREDNISONE 20 MG/1
40 TABLET ORAL DAILY
Qty: 10 TABLET | Refills: 0 | Status: SHIPPED | OUTPATIENT
Start: 2025-04-17 | End: 2025-04-22

## 2025-04-17 RX ORDER — ATORVASTATIN CALCIUM 10 MG/1
10 TABLET, FILM COATED ORAL DAILY
Qty: 90 TABLET | Refills: 3 | Status: SHIPPED | OUTPATIENT
Start: 2025-04-17

## 2025-04-17 NOTE — PROGRESS NOTES
Assessment & Plan     I did let him know I would like him to have his PSA checked and he prefers to do this at his upcoming wellness visit in 6 months.    (E11.9,  Z79.4) Type 2 diabetes mellitus without complication, with long-term current use of insulin (H)  (primary encounter diagnosis)  Comment:  Fasting BG is 101-112. A1C is under excellent control at 5.8%. Continue with current medication regimen  Plan: metFORMIN (GLUCOPHAGE XR) 500 MG 24 hr tablet,         Semaglutide, 2 MG/DOSE, (OZEMPIC) 8 MG/3ML pen            (J45.901) Moderate asthma with acute exacerbation, unspecified whether persistent  Comment: The patient reports experiencing wheezing, dyspnea, rhinitis, and cough for the past seven days, with mild dyspnea. He denies fever, chills, pharyngitis, or otalgia. He has not yet undergone testing for COVID-19. The patient is uncertain whether his symptoms are attributable to allergies, an asthma exacerbation, or an upper respiratory infection. He has attempted over-the-counter antihistamines without symptom relief. The patient has a known history of asthma. Today, we will initiate treatment for a presumed asthma exacerbation with prednisone.  Plan: predniSONE (DELTASONE) 20 MG tablet            (E78.5) Hyperlipidemia LDL goal <100  Comment:   Plan: atorvastatin (LIPITOR) 10 MG tablet            (I10) Benign essential hypertension  Comment:   Plan: losartan (COZAAR) 100 MG tablet            (M81.6) Localized osteoporosis without current pathological fracture  Comment: Started treatment with Fosamax for osteoporosis in 2024. Plan to repeat DXA in 2026  Plan: alendronate (FOSAMAX) 70 MG tablet            The longitudinal plan of care for the diagnosis(es)/condition(s) as documented were addressed during this visit. Due to the added complexity in care, I will continue to support Bill in the subsequent management and with ongoing continuity of care.        BMI  Estimated body mass index is 33.55 kg/m  as  "calculated from the following:    Height as of this encounter: 1.854 m (6' 1\").    Weight as of this encounter: 115.3 kg (254 lb 4.8 oz).   Weight management plan: Discussed healthy diet and exercise guidelines           Francisco Oates is a 65 year old, presenting for the following health issues:  Follow Up        4/17/2025     1:40 PM   Additional Questions   Roomed by Aartii   Accompanied by Self     History of Present Illness       Diabetes:   He presents for follow up of diabetes.  He is checking home blood glucose a few times a week.   He checks blood glucose before meals.  Blood glucose is never over 200 and never under 70.  When his blood glucose is low, the patient is asymptomatic for confusion, blurred vision, lethargy and reports not feeling dizzy, shaky, or weak.   He has no concerns regarding his diabetes at this time.   He is not experiencing numbness or burning in feet, excessive thirst, blurry vision, weight changes or redness, sores or blisters on feet. The patient has not had a diabetic eye exam in the last 12 months.          He eats 0-1 servings of fruits and vegetables daily.He consumes 0 sweetened beverage(s) daily.He exercises with enough effort to increase his heart rate 20 to 29 minutes per day.  He exercises with enough effort to increase his heart rate 7 days per week.   He is taking medications regularly.                      Objective    /82 (BP Location: Right arm, Patient Position: Sitting, Cuff Size: Adult Large)   Pulse 120   Temp 98.4  F (36.9  C) (Tympanic)   Resp 16   Ht 1.854 m (6' 1\")   Wt 115.3 kg (254 lb 4.8 oz)   SpO2 97%   BMI 33.55 kg/m    Body mass index is 33.55 kg/m .      Physical Exam  Constitutional:       General: He is not in acute distress.     Appearance: Normal appearance. He is not ill-appearing, toxic-appearing or diaphoretic.   HENT:      Head: Normocephalic and atraumatic.   Eyes:      Conjunctiva/sclera: Conjunctivae normal.   Cardiovascular:    "   Rate and Rhythm: Normal rate and regular rhythm.      Heart sounds: Normal heart sounds.   Pulmonary:      Effort: Pulmonary effort is normal.      Breath sounds: Normal breath sounds.   Skin:     General: Skin is warm and dry.   Neurological:      Mental Status: He is alert and oriented to person, place, and time.   Psychiatric:         Mood and Affect: Mood normal.         Behavior: Behavior normal.         Thought Content: Thought content normal.         Judgment: Judgment normal.           Signed Electronically by: REVA Sanabria CNP

## 2025-06-20 NOTE — PROGRESS NOTES
DISCHARGE SUMMARY    Kalen Callejas was seen 3 times for evaluation and treatment.  Patient did not return for further treatment and current status is unknown.  Due to short treatment duration, no objective or functional changes were made.  Please see goal flow sheet from episode noted date below and initial evaluation for further information.  Patient is discharged from therapy and therapy episode is resolved as of 09/28/22.      Linked Episodes   Type: Episode: Status: Noted: Resolved: Last update: Updated by:   PHYSICAL THERAPY back pain Active 8/8/2022 8/22/2022 11:03 AM Rhianna Lara, PT      Comments:     Inquires  Arturo Finnegan PT, DPT, CSCS  Physical Therapist  [facility], [place] [address]  [phone]       Universal Safety Interventions

## 2025-07-26 ENCOUNTER — HEALTH MAINTENANCE LETTER (OUTPATIENT)
Age: 65
End: 2025-07-26

## 2025-08-12 ENCOUNTER — LAB (OUTPATIENT)
Dept: LAB | Facility: CLINIC | Age: 65
End: 2025-08-12
Payer: COMMERCIAL

## 2025-08-12 DIAGNOSIS — E11.10 DIABETIC KETOACIDOSIS WITHOUT COMA ASSOCIATED WITH TYPE 2 DIABETES MELLITUS (H): Primary | ICD-10-CM

## 2025-08-12 LAB
EST. AVERAGE GLUCOSE BLD GHB EST-MCNC: 126 MG/DL
HBA1C MFR BLD: 6 % (ref 0–5.6)

## 2025-08-12 PROCEDURE — 36415 COLL VENOUS BLD VENIPUNCTURE: CPT

## 2025-08-12 PROCEDURE — 83036 HEMOGLOBIN GLYCOSYLATED A1C: CPT

## 2025-09-04 DIAGNOSIS — Z79.4 TYPE 2 DIABETES MELLITUS WITHOUT COMPLICATION, WITH LONG-TERM CURRENT USE OF INSULIN (H): ICD-10-CM

## 2025-09-04 DIAGNOSIS — E11.9 TYPE 2 DIABETES MELLITUS WITHOUT COMPLICATION, WITH LONG-TERM CURRENT USE OF INSULIN (H): ICD-10-CM

## 2025-09-04 RX ORDER — SEMAGLUTIDE 2.68 MG/ML
2 INJECTION, SOLUTION SUBCUTANEOUS
Qty: 9 ML | Refills: 3 | OUTPATIENT
Start: 2025-09-04

## (undated) DEVICE — DRAPE SHEET MED 44X70" 9355

## (undated) DEVICE — DRAPE C-ARM W/STRAPS 42X72" 07-CA104

## (undated) DEVICE — DRAIN HEMOVAC RESERVOIR KIT 10FR 1/8" MED 00-2550-002-10

## (undated) DEVICE — LINEN TOWEL PACK X30 5481

## (undated) DEVICE — SOL NACL 0.9% IRRIG 1000ML BOTTLE 2F7124

## (undated) DEVICE — LINEN GOWN XLG 5407

## (undated) DEVICE — SPONGE COTTONOID 1/2X1 1/2" 20-06S

## (undated) DEVICE — DRAPE SHEET REV FOLD 3/4 9349

## (undated) DEVICE — SYR BULB IRRIG DOVER 60 ML LATEX FREE 67000

## (undated) DEVICE — ESU CORD BIPOLAR GREEN 10-4000

## (undated) DEVICE — POSITIONER ARMBOARD FOAM 1PAIR LF FP-ARMB1

## (undated) DEVICE — ENDO FORCEP SPIKED SERRATED SHAFT JUMBO 239CM G56998

## (undated) DEVICE — PACK NEURO MINOR UMMC SNE32MNMU4

## (undated) DEVICE — SOL WATER IRRIG 1000ML BOTTLE 2F7114

## (undated) DEVICE — CUP AND LID 2PK 2OZ STERILE  SSK9006A

## (undated) DEVICE — ESU PENCIL W/COATED BLADE E2450H

## (undated) DEVICE — NDL BLUNT 15GA 1.5"

## (undated) DEVICE — PREP POVIDONE IODINE SOLUTION 10% 4OZ BOTTLE 29906-004

## (undated) DEVICE — DRAPE MICROSCOPE LEICA 54X120" 09-MK653

## (undated) DEVICE — BURR ELITE PRECISION ROUND 5MM 5820009050

## (undated) DEVICE — DRAPE O ARM TUBE 9732722

## (undated) DEVICE — DRSG 10X3.5IN MDPR POR CLTH NADH ADH WHT STRL LF 8X1.75IN

## (undated) DEVICE — ESU GROUND PAD ADULT W/CORD E7507

## (undated) DEVICE — DRAPE MAYO STAND 23X54 8337

## (undated) DEVICE — TUBING SMOKE EVAC 3/8"X10' 0702-045-023

## (undated) DEVICE — SU ETHILON 3-0 PS-1 18" 1663H

## (undated) DEVICE — NDL 30GA 0.5" 305106

## (undated) DEVICE — LINEN TOWEL PACK X6 WHITE 5487

## (undated) DEVICE — BUR STRK CARBIDE MATCH HEAD 3.0MM 5820-107-530C

## (undated) DEVICE — DRAPE POUCH INSTRUMENT 1018

## (undated) DEVICE — SU VICRYL 2-0 CT-2 CR 8X18" J726D

## (undated) DEVICE — IOM SUPPLIES/CASE FEE

## (undated) DEVICE — CATH TRAY FOLEY SURESTEP 16FR W/TMP PRB STLK LATEX A319416AM

## (undated) DEVICE — SU VICRYL 0 CT-1 CR 8X18" J740D

## (undated) DEVICE — SPONGE COTTONOID 1/2X1/2" 20-04S

## (undated) DEVICE — DRSG PRIMAPORE 03 1/8X6" 66000318

## (undated) DEVICE — PREP POVIDONE-IODINE 7.5% SCRUB 4OZ BOTTLE MDS093945

## (undated) DEVICE — PREP CHLORAPREP CLEAR 3ML 930400

## (undated) DEVICE — SYR 10ML LL W/O NDL 302995

## (undated) DEVICE — PREP CHLORAPREP 26ML TINTED HI-LITE ORANGE 930815

## (undated) DEVICE — SPONGE SURGIFOAM 12 1972

## (undated) DEVICE — PREP SKIN SCRUB TRAY 4461A

## (undated) DEVICE — RX SURGIFLO HEMOSTATIC MATRIX W/THROMBIN 8ML 2994

## (undated) RX ORDER — FENTANYL CITRATE 50 UG/ML
INJECTION, SOLUTION INTRAMUSCULAR; INTRAVENOUS
Status: DISPENSED
Start: 2022-06-13

## (undated) RX ORDER — SODIUM CHLORIDE 9 MG/ML
INJECTION, SOLUTION INTRAVENOUS
Status: DISPENSED
Start: 2022-06-13

## (undated) RX ORDER — HYDROMORPHONE HYDROCHLORIDE 1 MG/ML
INJECTION, SOLUTION INTRAMUSCULAR; INTRAVENOUS; SUBCUTANEOUS
Status: DISPENSED
Start: 2022-06-13

## (undated) RX ORDER — FENTANYL CITRATE 50 UG/ML
INJECTION, SOLUTION INTRAMUSCULAR; INTRAVENOUS
Status: DISPENSED
Start: 2024-12-20

## (undated) RX ORDER — PROPOFOL 10 MG/ML
INJECTION, EMULSION INTRAVENOUS
Status: DISPENSED
Start: 2022-06-13

## (undated) RX ORDER — LIDOCAINE HYDROCHLORIDE AND EPINEPHRINE 10; 10 MG/ML; UG/ML
INJECTION, SOLUTION INFILTRATION; PERINEURAL
Status: DISPENSED
Start: 2022-06-13

## (undated) RX ORDER — LIDOCAINE HYDROCHLORIDE 20 MG/ML
INJECTION, SOLUTION EPIDURAL; INFILTRATION; INTRACAUDAL; PERINEURAL
Status: DISPENSED
Start: 2022-06-13

## (undated) RX ORDER — ESMOLOL HYDROCHLORIDE 10 MG/ML
INJECTION INTRAVENOUS
Status: DISPENSED
Start: 2022-06-13

## (undated) RX ORDER — HYDROMORPHONE HCL IN WATER/PF 6 MG/30 ML
PATIENT CONTROLLED ANALGESIA SYRINGE INTRAVENOUS
Status: DISPENSED
Start: 2022-06-13

## (undated) RX ORDER — IPRATROPIUM BROMIDE AND ALBUTEROL SULFATE 2.5; .5 MG/3ML; MG/3ML
SOLUTION RESPIRATORY (INHALATION)
Status: DISPENSED
Start: 2022-06-13

## (undated) RX ORDER — VANCOMYCIN HYDROCHLORIDE 1 G/20ML
INJECTION, POWDER, LYOPHILIZED, FOR SOLUTION INTRAVENOUS
Status: DISPENSED
Start: 2022-06-13

## (undated) RX ORDER — ONDANSETRON 2 MG/ML
INJECTION INTRAMUSCULAR; INTRAVENOUS
Status: DISPENSED
Start: 2022-06-13

## (undated) RX ORDER — SIMETHICONE 40MG/0.6ML
SUSPENSION, DROPS(FINAL DOSAGE FORM)(ML) ORAL
Status: DISPENSED
Start: 2024-12-20

## (undated) RX ORDER — CEFAZOLIN SODIUM/WATER 3 G/30 ML
SYRINGE (ML) INTRAVENOUS
Status: DISPENSED
Start: 2022-06-13

## (undated) RX ORDER — BUPIVACAINE HYDROCHLORIDE AND EPINEPHRINE 5; 5 MG/ML; UG/ML
INJECTION, SOLUTION PERINEURAL
Status: DISPENSED
Start: 2022-06-13

## (undated) RX ORDER — SODIUM CHLORIDE, SODIUM LACTATE, POTASSIUM CHLORIDE, CALCIUM CHLORIDE 600; 310; 30; 20 MG/100ML; MG/100ML; MG/100ML; MG/100ML
INJECTION, SOLUTION INTRAVENOUS
Status: DISPENSED
Start: 2022-06-13